# Patient Record
Sex: MALE | Race: WHITE
[De-identification: names, ages, dates, MRNs, and addresses within clinical notes are randomized per-mention and may not be internally consistent; named-entity substitution may affect disease eponyms.]

---

## 2020-08-27 ENCOUNTER — HOSPITAL ENCOUNTER (OUTPATIENT)
Dept: HOSPITAL 75 - RAD FS | Age: 60
End: 2020-08-27
Attending: NURSE PRACTITIONER
Payer: MEDICAID

## 2020-08-27 DIAGNOSIS — R74.8: Primary | ICD-10-CM

## 2020-08-27 LAB
ALBUMIN SERPL-MCNC: 4.1 GM/DL (ref 3.2–4.5)
ALP SERPL-CCNC: 63 U/L (ref 40–136)
ALT SERPL-CCNC: 25 U/L (ref 0–55)
BILIRUB SERPL-MCNC: 0.5 MG/DL (ref 0.1–1)
BUN/CREAT SERPL: 5
CALCIUM SERPL-MCNC: 9 MG/DL (ref 8.5–10.1)
CHLORIDE SERPL-SCNC: 91 MMOL/L (ref 98–107)
CO2 SERPL-SCNC: 20 MMOL/L (ref 21–32)
CREAT SERPL-MCNC: 0.56 MG/DL (ref 0.6–1.3)
GFR SERPLBLD BASED ON 1.73 SQ M-ARVRAT: > 60 ML/MIN
GLUCOSE SERPL-MCNC: 89 MG/DL (ref 70–105)
POTASSIUM SERPL-SCNC: 4.3 MMOL/L (ref 3.6–5)
PROT SERPL-MCNC: 6.9 GM/DL (ref 6.4–8.2)
SODIUM SERPL-SCNC: 125 MMOL/L (ref 135–145)

## 2020-08-27 PROCEDURE — 80053 COMPREHEN METABOLIC PANEL: CPT

## 2020-08-27 PROCEDURE — 36415 COLL VENOUS BLD VENIPUNCTURE: CPT

## 2020-08-27 PROCEDURE — 74170 CT ABD WO CNTRST FLWD CNTRST: CPT

## 2020-08-27 NOTE — DIAGNOSTIC IMAGING REPORT
EXAMINATION: CT Abdomen with and without intravenous contrast.



TECHNIQUE: Precontrast acquisitions were acquired through the

abdomen . Multiple contiguous axial images were obtained through

the abdomen after the administration of intravenous contrast. All

CT scans use one or more of the following dose optimizing

techniques: automated exposure control, MA and/or KvP adjustment

based on a patient size and exam type, or iterative

reconstruction. 



HISTORY: Liver mass.



COMPARISON: None available.



FINDINGS: 



Limited views of the lower thorax show coronary artery

calcifications. 



The liver is normal without focal lesion. There is no biliary

ductal dilation. Gallbladder is normal.  Pancreas is normal. 

Spleen is normal. Adrenal glands are normal.



The kidneys are normal. There is no hydronephrosis.



Visualized bowel is normal in caliber without obstruction or

inflammation. No free fluid or air. No abdominal lymphadenopathy.

Aorta is normal in caliber without aneurysm.



There are no suspicious osseus lesions.



IMPRESSION:



1. No liver mass is identified.



Dictated by: 



  Dictated on workstation # [id number]

## 2022-05-30 ENCOUNTER — HOSPITAL ENCOUNTER (INPATIENT)
Dept: HOSPITAL 75 - ER FS | Age: 62
LOS: 11 days | Discharge: NURSING, REHAB OR LONG-TERM CARE | DRG: 981 | End: 2022-06-10
Attending: FAMILY MEDICINE | Admitting: INTERNAL MEDICINE
Payer: MEDICARE

## 2022-05-30 VITALS — BODY MASS INDEX: 24.81 KG/M2 | WEIGHT: 187.17 LBS | HEIGHT: 73.03 IN

## 2022-05-30 VITALS — DIASTOLIC BLOOD PRESSURE: 88 MMHG | SYSTOLIC BLOOD PRESSURE: 154 MMHG

## 2022-05-30 VITALS — DIASTOLIC BLOOD PRESSURE: 93 MMHG | SYSTOLIC BLOOD PRESSURE: 160 MMHG

## 2022-05-30 DIAGNOSIS — F12.90: ICD-10-CM

## 2022-05-30 DIAGNOSIS — I25.10: ICD-10-CM

## 2022-05-30 DIAGNOSIS — I35.0: ICD-10-CM

## 2022-05-30 DIAGNOSIS — J44.1: ICD-10-CM

## 2022-05-30 DIAGNOSIS — R53.81: ICD-10-CM

## 2022-05-30 DIAGNOSIS — E78.2: ICD-10-CM

## 2022-05-30 DIAGNOSIS — I11.0: ICD-10-CM

## 2022-05-30 DIAGNOSIS — J18.9: ICD-10-CM

## 2022-05-30 DIAGNOSIS — J96.21: Primary | ICD-10-CM

## 2022-05-30 DIAGNOSIS — F17.210: ICD-10-CM

## 2022-05-30 DIAGNOSIS — I50.31: ICD-10-CM

## 2022-05-30 DIAGNOSIS — M79.652: ICD-10-CM

## 2022-05-30 DIAGNOSIS — I69.351: ICD-10-CM

## 2022-05-30 DIAGNOSIS — Z20.822: ICD-10-CM

## 2022-05-30 DIAGNOSIS — I21.A1: ICD-10-CM

## 2022-05-30 LAB
ALBUMIN SERPL-MCNC: 4 GM/DL (ref 3.2–4.5)
ALP SERPL-CCNC: 87 U/L (ref 40–136)
ALT SERPL-CCNC: 21 U/L (ref 0–55)
ARTERIAL PATENCY WRIST A: (no result)
BASE EXCESS STD BLDA CALC-SCNC: -4.1 MMOL/L (ref -2.5–2.5)
BASE EXCESS STD BLDA CALC-SCNC: -8.5 MMOL/L (ref -2.5–2.5)
BASOPHILS # BLD AUTO: 0 10^3/UL (ref 0–0.1)
BASOPHILS NFR BLD AUTO: 0 % (ref 0–10)
BDY SITE: (no result)
BDY SITE: (no result)
BILIRUB SERPL-MCNC: 0.5 MG/DL (ref 0.1–1)
BODY TEMPERATURE: 35.4
BODY TEMPERATURE: 35.7
BUN/CREAT SERPL: 12
CALCIUM SERPL-MCNC: 8.9 MG/DL (ref 8.5–10.1)
CHLORIDE SERPL-SCNC: 97 MMOL/L (ref 98–107)
CO2 BLDA CALC-SCNC: 18.4 MMOL/L (ref 21–31)
CO2 BLDA CALC-SCNC: 21.2 MMOL/L (ref 21–31)
CO2 SERPL-SCNC: 17 MMOL/L (ref 21–32)
CREAT SERPL-MCNC: 0.74 MG/DL (ref 0.6–1.3)
EOSINOPHIL # BLD AUTO: 0 10^3/UL (ref 0–0.3)
EOSINOPHIL NFR BLD AUTO: 0 % (ref 0–10)
GFR SERPLBLD BASED ON 1.73 SQ M-ARVRAT: 103 ML/MIN
GLUCOSE SERPL-MCNC: 201 MG/DL (ref 70–105)
HCT VFR BLD CALC: 45 % (ref 40–54)
HGB BLD-MCNC: 15 G/DL (ref 13.3–17.7)
INHALED O2 FLOW RATE: (no result) L/MIN
INHALED O2 FLOW RATE: (no result) L/MIN
LYMPHOCYTES # BLD AUTO: 1.6 10^3/UL (ref 1–4)
LYMPHOCYTES NFR BLD AUTO: 11 % (ref 12–44)
MANUAL DIFFERENTIAL PERFORMED BLD QL: NO
MCH RBC QN AUTO: 33 PG (ref 25–34)
MCHC RBC AUTO-ENTMCNC: 33 G/DL (ref 32–36)
MCV RBC AUTO: 100 FL (ref 80–99)
MONOCYTES # BLD AUTO: 0.9 10^3/UL (ref 0–1)
MONOCYTES NFR BLD AUTO: 7 % (ref 0–12)
NEUTROPHILS # BLD AUTO: 11.3 10^3/UL (ref 1.8–7.8)
NEUTROPHILS NFR BLD AUTO: 81 % (ref 42–75)
PCO2 BLDA: 32 MMHG (ref 35–45)
PCO2 BLDA: 36 MMHG (ref 35–45)
PH BLDA: 7.29 [PH] (ref 7.37–7.43)
PH BLDA: 7.4 [PH] (ref 7.37–7.43)
PLATELET # BLD: 222 10^3/UL (ref 130–400)
PMV BLD AUTO: 9.7 FL (ref 9–12.2)
PO2 BLDA: 132 MMHG (ref 79–93)
PO2 BLDA: 40 MMHG (ref 79–93)
POTASSIUM SERPL-SCNC: 4.5 MMOL/L (ref 3.6–5)
PROT SERPL-MCNC: 7.4 GM/DL (ref 6.4–8.2)
SAO2 % BLDA FROM PO2: 79 % (ref 94–100)
SAO2 % BLDA FROM PO2: 99 % (ref 94–100)
SODIUM SERPL-SCNC: 135 MMOL/L (ref 135–145)
VENTILATION MODE VENT: NO
VENTILATION MODE VENT: NO
WBC # BLD AUTO: 13.9 10^3/UL (ref 4.3–11)

## 2022-05-30 PROCEDURE — 96374 THER/PROPH/DIAG INJ IV PUSH: CPT

## 2022-05-30 PROCEDURE — 82947 ASSAY GLUCOSE BLOOD QUANT: CPT

## 2022-05-30 PROCEDURE — 96375 TX/PRO/DX INJ NEW DRUG ADDON: CPT

## 2022-05-30 PROCEDURE — 87205 SMEAR GRAM STAIN: CPT

## 2022-05-30 PROCEDURE — 93005 ELECTROCARDIOGRAM TRACING: CPT

## 2022-05-30 PROCEDURE — 99291 CRITICAL CARE FIRST HOUR: CPT

## 2022-05-30 PROCEDURE — 80053 COMPREHEN METABOLIC PANEL: CPT

## 2022-05-30 PROCEDURE — 84100 ASSAY OF PHOSPHORUS: CPT

## 2022-05-30 PROCEDURE — 94003 VENT MGMT INPAT SUBQ DAY: CPT

## 2022-05-30 PROCEDURE — 93454 CORONARY ARTERY ANGIO S&I: CPT

## 2022-05-30 PROCEDURE — 82805 BLOOD GASES W/O2 SATURATION: CPT

## 2022-05-30 PROCEDURE — 70450 CT HEAD/BRAIN W/O DYE: CPT

## 2022-05-30 PROCEDURE — 87070 CULTURE OTHR SPECIMN AEROBIC: CPT

## 2022-05-30 PROCEDURE — 5A09357 ASSISTANCE WITH RESPIRATORY VENTILATION, LESS THAN 24 CONSECUTIVE HOURS, CONTINUOUS POSITIVE AIRWAY PRESSURE: ICD-10-PCS | Performed by: FAMILY MEDICINE

## 2022-05-30 PROCEDURE — 94760 N-INVAS EAR/PLS OXIMETRY 1: CPT

## 2022-05-30 PROCEDURE — 93306 TTE W/DOPPLER COMPLETE: CPT

## 2022-05-30 PROCEDURE — 85025 COMPLETE CBC W/AUTO DIFF WBC: CPT

## 2022-05-30 PROCEDURE — 36600 WITHDRAWAL OF ARTERIAL BLOOD: CPT

## 2022-05-30 PROCEDURE — 84484 ASSAY OF TROPONIN QUANT: CPT

## 2022-05-30 PROCEDURE — 87040 BLOOD CULTURE FOR BACTERIA: CPT

## 2022-05-30 PROCEDURE — 71045 X-RAY EXAM CHEST 1 VIEW: CPT

## 2022-05-30 PROCEDURE — 85007 BL SMEAR W/DIFF WBC COUNT: CPT

## 2022-05-30 PROCEDURE — 83880 ASSAY OF NATRIURETIC PEPTIDE: CPT

## 2022-05-30 PROCEDURE — 87081 CULTURE SCREEN ONLY: CPT

## 2022-05-30 PROCEDURE — 94799 UNLISTED PULMONARY SVC/PX: CPT

## 2022-05-30 PROCEDURE — 83735 ASSAY OF MAGNESIUM: CPT

## 2022-05-30 PROCEDURE — 36415 COLL VENOUS BLD VENIPUNCTURE: CPT

## 2022-05-30 PROCEDURE — 84478 ASSAY OF TRIGLYCERIDES: CPT

## 2022-05-30 PROCEDURE — 83036 HEMOGLOBIN GLYCOSYLATED A1C: CPT

## 2022-05-30 PROCEDURE — 80048 BASIC METABOLIC PNL TOTAL CA: CPT

## 2022-05-30 PROCEDURE — 94640 AIRWAY INHALATION TREATMENT: CPT

## 2022-05-30 PROCEDURE — 80061 LIPID PANEL: CPT

## 2022-05-30 PROCEDURE — 87636 SARSCOV2 & INF A&B AMP PRB: CPT

## 2022-05-30 PROCEDURE — 94002 VENT MGMT INPAT INIT DAY: CPT

## 2022-05-30 PROCEDURE — 83605 ASSAY OF LACTIC ACID: CPT

## 2022-05-30 PROCEDURE — 85027 COMPLETE CBC AUTOMATED: CPT

## 2022-05-30 PROCEDURE — 93925 LOWER EXTREMITY STUDY: CPT

## 2022-05-30 RX ADMIN — DOCUSATE SODIUM SCH MG: 100 CAPSULE ORAL at 21:51

## 2022-05-30 RX ADMIN — FAMOTIDINE SCH MG: 10 INJECTION INTRAVENOUS at 22:05

## 2022-05-30 RX ADMIN — IPRATROPIUM BROMIDE AND ALBUTEROL SULFATE SCH ML: .5; 3 SOLUTION RESPIRATORY (INHALATION) at 22:08

## 2022-05-30 RX ADMIN — ENOXAPARIN SODIUM SCH MG: 100 INJECTION SUBCUTANEOUS at 22:05

## 2022-05-30 RX ADMIN — SODIUM CHLORIDE SCH MLS/HR: 900 INJECTION, SOLUTION INTRAVENOUS at 21:51

## 2022-05-30 RX ADMIN — INSULIN ASPART SCH UNIT: 100 INJECTION, SOLUTION INTRAVENOUS; SUBCUTANEOUS at 21:02

## 2022-05-30 RX ADMIN — METHYLPREDNISOLONE SODIUM SUCCINATE SCH MG: 40 INJECTION, POWDER, FOR SOLUTION INTRAMUSCULAR; INTRAVENOUS at 23:20

## 2022-05-30 RX ADMIN — SODIUM CHLORIDE SCH MLS/HR: 900 INJECTION INTRAVENOUS at 23:20

## 2022-05-30 NOTE — ED RESPIRATORY
General


Chief Complaint:  Respiratory Problems


Stated Complaint:  COPD


Nursing Triage Note:  


Patient and family report patient has COPD. Family states patient became short 


of breath overnight, wife also states patient had diarrhea overnight.


Source:  patient


Exam Limitations:  no limitations





History of Present Illness


Date Seen by Provider:  May 30, 2022


Time Seen by Provider:  16:30


Initial Comments


Patient is a 62-year-old male with history of COPD who presents to the ED with 

fever, cough, shortness of breath and increased work of breathing.  Patient has 

also had diarrhea for the past day and a half.  EMS was contacted patient was 

found to have moderate respiratory distress with O2 saturation in the upper 60s.

 Placed patient on 4 L and transition to BiPAP on EMS arrival.  Denies chest 

pain, nausea vomiting or sweats abdominal pain.  Reports chest tightness no 

chest pain.  History limited by the patient's respiratory status


Timing/Duration:  just prior to arrival


Severity:  moderate


Prior Episodes/Possible Cause:  other


Modifying Factors:  Improves With Oxygen


Associated Symptoms:  other





Allergies and Home Medications


Allergies


Coded Allergies:  


     No Known Drug Allergies (Unverified , 5/30/22)





Patient Home Medication List


Home Medication List Reviewed:  Yes





Review of Systems


Review of Systems


Constitutional:  see HPI


EENTM:  see HPI


Respiratory:  see HPI


Cardiovascular:  see HPI


Gastrointestinal:  see HPI


Genitourinary:  see HPI


Psychiatric/Neurological:  See HPI


Hematologic/Lymphatic:  See HPI


Immunological/Allergic:  see HPI





Past Medical-Social-Family Hx


Patient Social History


Tobacco Use?:  Yes


Tobacco type used:  Cigarettes


Substance use?:  No


Alcohol Use?:  No


Pt feels they are or have been:  No





Immunizations Up To Date


First/Initial COVID19 Vaccinat:  7/27/21


COVID19 Vaccine :  J&J





Past Medical History


Surgery/Hospitalization HX:  


COPD, Stroke





Physical Exam





Vital Signs - First Documented








 5/30/22





 16:42


 


Temp 35.7


 


Pulse 113


 


Resp 37


 


B/P (MAP) 121/92 (102)


 


Pulse Ox 100


 


O2 Delivery NIV Bilevel





Capillary Refill : Less Than 3 Seconds


Height: '"


Weight: lbs. oz. kg; 21.00 BMI


Method:


General Appearance:  mild distress


Eyes:  Bilateral Eye Normal Inspection, Bilateral Eye PERRL


HEENT:  PERRL/EOMI, pharynx normal


Neck:  non-tender, supple


Respiratory:  normal breath sounds, decreased breath sounds


Cardiovascular:  regular rate, rhythm, no edema


Gastrointestinal:  non tender, soft


Extremities:  normal range of motion, non-tender


Neurologic/Psychiatric:  CNs II-XII nml as tested, no motor/sensory deficits, 

alert, oriented x 3


Lymphatic:  no adenopathy





Focused Exam


Sepsis Stage:  Ruled Out





Progress/Results/Core Measures


Suspected Sepsis


SIRS


Temperature: 


Pulse: 113 


Respiratory Rate: 37


 


Laboratory Tests


5/30/22 16:46: White Blood Count 13.9H


Blood Pressure 121 /92 


Mean: 102


 


Laboratory Tests


5/30/22 16:46: 


Creatinine 0.74, Platelet Count 222, Total Bilirubin 0.5








Results/Orders


Lab Results





Laboratory Tests








Test


 5/30/22


16:46 5/30/22


17:05 5/30/22


17:10 Range/Units


 


 


White Blood Count


 13.9 H


 


 


 4.3-11.0


10^3/uL


 


Red Blood Count


 4.52 


 


 


 4.30-5.52


10^6/uL


 


Hemoglobin 15.0    13.3-17.7  g/dL


 


Hematocrit 45    40-54  %


 


Mean Corpuscular Volume 100 H   80-99  fL


 


Mean Corpuscular Hemoglobin 33    25-34  pg


 


Mean Corpuscular Hemoglobin


Concent 33 


 


 


 32-36  g/dL





 


Red Cell Distribution Width 13.8    10.0-14.5  %


 


Platelet Count


 222 


 


 


 130-400


10^3/uL


 


Mean Platelet Volume 9.7    9.0-12.2  fL


 


Immature Granulocyte % (Auto) 1     %


 


Neutrophils (%) (Auto) 81 H   42-75  %


 


Lymphocytes (%) (Auto) 11 L   12-44  %


 


Monocytes (%) (Auto) 7    0-12  %


 


Eosinophils (%) (Auto) 0    0-10  %


 


Basophils (%) (Auto) 0    0-10  %


 


Neutrophils # (Auto)


 11.3 H


 


 


 1.8-7.8


10^3/uL


 


Lymphocytes # (Auto)


 1.6 


 


 


 1.0-4.0


10^3/uL


 


Monocytes # (Auto)


 0.9 


 


 


 0.0-1.0


10^3/uL


 


Eosinophils # (Auto)


 0.0 


 


 


 0.0-0.3


10^3/uL


 


Basophils # (Auto)


 0.0 


 


 


 0.0-0.1


10^3/uL


 


Immature Granulocyte # (Auto)


 0.1 


 


 


 0.0-0.1


10^3/uL


 


Sodium Level 135    135-145  MMOL/L


 


Potassium Level 4.5    3.6-5.0  MMOL/L


 


Chloride Level 97 L     MMOL/L


 


Carbon Dioxide Level 17 L   21-32  MMOL/L


 


Anion Gap 21 H   5-14  MMOL/L


 


Blood Urea Nitrogen 9    7-18  MG/DL


 


Creatinine


 0.74 


 


 


 0.60-1.30


MG/DL


 


Estimat Glomerular Filtration


Rate 103 


 


 


  





 


BUN/Creatinine Ratio 12     


 


Glucose Level 201 H     MG/DL


 


Calcium Level 8.9    8.5-10.1  MG/DL


 


Corrected Calcium 8.9    8.5-10.1  MG/DL


 


Total Bilirubin 0.5    0.1-1.0  MG/DL


 


Aspartate Amino Transf


(AST/SGOT) 39 H


 


 


 5-34  U/L





 


Alanine Aminotransferase


(ALT/SGPT) 21 


 


 


 0-55  U/L





 


Alkaline Phosphatase 87      U/L


 


Troponin I 0.53 *H   <0.30  NG/ML


 


Pro-B-Type Natriuretic Peptide 8897.0 H   <75.0  PG/ML


 


Total Protein 7.4    6.4-8.2  GM/DL


 


Albumin 4.0    3.2-4.5  GM/DL


 


Blood Gas Puncture Site  LT RADIAL    


 


Blood Gas Patient Temperature  35.7    


 


Arterial Blood pH  7.29 *L  7.37-7.43  


 


Arterial Blood Partial


Pressure CO2 


 36 


 


 35-45  MMHG





 


Arterial Blood Partial


Pressure O2 


 132 H


 


 79-93  MMHG





 


Arterial Blood HCO3  17 *L  23-27  MMOL/L


 


Arterial Blood Total CO2


 


 18.4 L


 


 21.0-31.0


MMOL/L


 


Arterial Blood Oxygen


Saturation 


 99 


 


   %





 


Arterial Blood Base Excess


 


 -8.5 L


 


 -2.5-2.5


MMOL/L


 


Hansel Test  YES-POS    


 


Blood Gas Ventilator Setting  NO    


 


Blood Gas Inspired Oxygen  100% BIPAP    








My Orders





Orders - GALO MONTOYA DO


Albuterol/Ipra Inhalation Soln (Duoneb I (5/30/22 16:42)


Cbc With Automated Diff (5/30/22 16:49)


Comprehensive Metabolic Panel (5/30/22 16:49)


Troponin I Fs (5/30/22 16:49)


Chest 1 View Ap/Pa Only (5/30/22 16:49)


Ekg Tracing (5/30/22 16:49)


Probnp Fs (5/30/22 16:49)


Methylprednisolone Sod Succ (Solu-Medrol (5/30/22 17:00)


Albuterol/Ipra Inhalation Soln (Duoneb I (5/30/22 17:00)


Svn Small Volume Nebulizer (5/30/22 16:49)


Bipap (Bilevel) Set Up (5/30/22 16:49)


Methylprednisolone Sod Succ (Solu-Medrol (5/30/22 17:00)


Covid 19 Inhouse Test (5/30/22 17:08)


Influenza A And B By Pcr (5/30/22 17:08)


Isolation Central Supply Req (5/30/22 17:08)


Arterial Blood Gas (5/30/22 17:22)


Furosemide  Injection (Lasix  Injection) (5/30/22 17:45)


Piperacillin Sodium/Tazobactam (Zosyn Vi (5/30/22 18:15)


Lactic Acid Analyzer (5/30/22 18:07)





Medications Given in ED





Current Medications








 Medications  Dose


 Ordered  Sig/Jarocho


 Route  Start Time


 Stop Time Status Last Admin


Dose Admin


 


 Albuterol/


 Ipratropium  6 ml  ONCE  ONCE


 INH  5/30/22 17:00


 5/30/22 17:01 DC 5/30/22 16:59


6 ML


 


 Furosemide  40 mg  ONCE  ONCE


 IVP  5/30/22 17:45


 5/30/22 17:47 DC 5/30/22 18:09


40 MG


 


 Methylprednisolone


 Sodium Succinate  125 mg  ONCE  ONCE


 IVP  5/30/22 17:00


 5/30/22 17:01 DC 5/30/22 17:01


125 MG








Vital Signs/I&O











 5/30/22





 16:42


 


Temp 35.7


 


Pulse 113


 


Resp 37


 


B/P (MAP) 121/92 (102)


 


Pulse Ox 100


 


O2 Delivery NIV Bilevel





Capillary Refill : Less Than 3 Seconds








Blood Pressure Mean:                    102











Departure


Communication (Admissions)


Chest x-ray: Bibasilar opacities.


EKG: Sinus tach, rate 129, no acute ST-T wave changes.





Patient maintained on BiPAP given DuoNeb, Solu-Medrol and Lasix with decreased 

work of breathing and improved oxygen saturations.  Dr. Mueller to admt.





Impression





   Primary Impression:  


   Acute exacerbation of chronic obstructive pulmonary disease (COPD)


   Additional Impressions:  


   Acute congestive heart failure


   Acute respiratory failure with hypoxia


Disposition:  09 ADMITTED AS INPATIENT


Condition:  Improved





Admissions


Decision to Admit/Date:  May 30, 2022


Time/Decision to Admit Time:  18:32





Departure-Patient Inst.


Referrals:  


SHAGUFTA PRETTY MD, ([facility]) (PCP/Family)


Primary Care Physician











GALO MONTOYA DO                   May 30, 2022 18:03

## 2022-05-30 NOTE — DIAGNOSTIC IMAGING REPORT
CHEST 1 VIEW AP/PA ONLY



Indication: Shortness of air



Comparison: None available. 



Findings:

Heart is normal in size. Bilateral interstitial opacities are

present. No pleural effusion or pneumothorax. Normal

cardiomediastinal silhouette.



Impression: 

1. Basilar interstitial opacities raise the possibility of

noncardiogenic pulmonary edema. Underlying interstitial lung

disease could be present.



Dictated by: 



  Dictated on workstation # [id number]

## 2022-05-31 VITALS — DIASTOLIC BLOOD PRESSURE: 71 MMHG | SYSTOLIC BLOOD PRESSURE: 102 MMHG

## 2022-05-31 VITALS — DIASTOLIC BLOOD PRESSURE: 118 MMHG | SYSTOLIC BLOOD PRESSURE: 148 MMHG

## 2022-05-31 VITALS — SYSTOLIC BLOOD PRESSURE: 134 MMHG | DIASTOLIC BLOOD PRESSURE: 124 MMHG

## 2022-05-31 VITALS — SYSTOLIC BLOOD PRESSURE: 172 MMHG | DIASTOLIC BLOOD PRESSURE: 88 MMHG

## 2022-05-31 VITALS — DIASTOLIC BLOOD PRESSURE: 72 MMHG | SYSTOLIC BLOOD PRESSURE: 108 MMHG

## 2022-05-31 VITALS — DIASTOLIC BLOOD PRESSURE: 57 MMHG | SYSTOLIC BLOOD PRESSURE: 81 MMHG

## 2022-05-31 VITALS — DIASTOLIC BLOOD PRESSURE: 73 MMHG | SYSTOLIC BLOOD PRESSURE: 113 MMHG

## 2022-05-31 VITALS — DIASTOLIC BLOOD PRESSURE: 73 MMHG | SYSTOLIC BLOOD PRESSURE: 111 MMHG

## 2022-05-31 VITALS — SYSTOLIC BLOOD PRESSURE: 80 MMHG | DIASTOLIC BLOOD PRESSURE: 59 MMHG

## 2022-05-31 VITALS — DIASTOLIC BLOOD PRESSURE: 72 MMHG | SYSTOLIC BLOOD PRESSURE: 98 MMHG

## 2022-05-31 VITALS — DIASTOLIC BLOOD PRESSURE: 88 MMHG | SYSTOLIC BLOOD PRESSURE: 172 MMHG

## 2022-05-31 VITALS — SYSTOLIC BLOOD PRESSURE: 136 MMHG | DIASTOLIC BLOOD PRESSURE: 96 MMHG

## 2022-05-31 VITALS — DIASTOLIC BLOOD PRESSURE: 71 MMHG | SYSTOLIC BLOOD PRESSURE: 100 MMHG

## 2022-05-31 VITALS — DIASTOLIC BLOOD PRESSURE: 86 MMHG | SYSTOLIC BLOOD PRESSURE: 137 MMHG

## 2022-05-31 VITALS — SYSTOLIC BLOOD PRESSURE: 63 MMHG | DIASTOLIC BLOOD PRESSURE: 47 MMHG

## 2022-05-31 LAB
ALBUMIN SERPL-MCNC: 3.6 GM/DL (ref 3.2–4.5)
ALP SERPL-CCNC: 63 U/L (ref 40–136)
ALT SERPL-CCNC: 22 U/L (ref 0–55)
ARTERIAL PATENCY WRIST A: (no result)
ARTERIAL PATENCY WRIST A: (no result)
BASE EXCESS STD BLDA CALC-SCNC: -3.5 MMOL/L (ref -2.5–2.5)
BASE EXCESS STD BLDA CALC-SCNC: -5.3 MMOL/L (ref -2.5–2.5)
BASOPHILS # BLD AUTO: 0 10^3/UL (ref 0–0.1)
BASOPHILS NFR BLD AUTO: 0 % (ref 0–10)
BDY SITE: (no result)
BDY SITE: (no result)
BILIRUB SERPL-MCNC: 0.7 MG/DL (ref 0.1–1)
BODY TEMPERATURE: 36.5
BODY TEMPERATURE: 37.2
BUN/CREAT SERPL: 16
BUN/CREAT SERPL: 16
CALCIUM SERPL-MCNC: 8.3 MG/DL (ref 8.5–10.1)
CALCIUM SERPL-MCNC: 8.6 MG/DL (ref 8.5–10.1)
CHLORIDE SERPL-SCNC: 101 MMOL/L (ref 98–107)
CHLORIDE SERPL-SCNC: 102 MMOL/L (ref 98–107)
CHOLEST SERPL-MCNC: 136 MG/DL (ref ?–200)
CO2 BLDA CALC-SCNC: 21.3 MMOL/L (ref 21–31)
CO2 BLDA CALC-SCNC: 22.5 MMOL/L (ref 21–31)
CO2 SERPL-SCNC: 15 MMOL/L (ref 21–32)
CO2 SERPL-SCNC: 18 MMOL/L (ref 21–32)
CREAT SERPL-MCNC: 0.8 MG/DL (ref 0.6–1.3)
CREAT SERPL-MCNC: 0.91 MG/DL (ref 0.6–1.3)
EOSINOPHIL # BLD AUTO: 0 10^3/UL (ref 0–0.3)
EOSINOPHIL NFR BLD AUTO: 0 % (ref 0–10)
GFR SERPLBLD BASED ON 1.73 SQ M-ARVRAT: 101 ML/MIN
GFR SERPLBLD BASED ON 1.73 SQ M-ARVRAT: 96 ML/MIN
GLUCOSE SERPL-MCNC: 163 MG/DL (ref 70–105)
GLUCOSE SERPL-MCNC: 192 MG/DL (ref 70–105)
HCT VFR BLD CALC: 42 % (ref 40–54)
HCT VFR BLD CALC: 45 % (ref 40–54)
HDLC SERPL-MCNC: 52 MG/DL (ref 40–60)
HGB BLD-MCNC: 14.6 G/DL (ref 13.3–17.7)
HGB BLD-MCNC: 15.3 G/DL (ref 13.3–17.7)
INHALED O2 FLOW RATE: (no result) L/MIN
INHALED O2 FLOW RATE: (no result) L/MIN
LYMPHOCYTES # BLD AUTO: 0.9 10^3/UL (ref 1–4)
LYMPHOCYTES NFR BLD AUTO: 10 % (ref 12–44)
MAGNESIUM SERPL-MCNC: 1.3 MG/DL (ref 1.6–2.4)
MANUAL DIFFERENTIAL PERFORMED BLD QL: NO
MCH RBC QN AUTO: 33 PG (ref 25–34)
MCH RBC QN AUTO: 33 PG (ref 25–34)
MCHC RBC AUTO-ENTMCNC: 34 G/DL (ref 32–36)
MCHC RBC AUTO-ENTMCNC: 35 G/DL (ref 32–36)
MCV RBC AUTO: 96 FL (ref 80–99)
MCV RBC AUTO: 98 FL (ref 80–99)
MONOCYTES # BLD AUTO: 0.3 10^3/UL (ref 0–1)
MONOCYTES NFR BLD AUTO: 3 % (ref 0–12)
NEUTROPHILS # BLD AUTO: 7.8 10^3/UL (ref 1.8–7.8)
NEUTROPHILS NFR BLD AUTO: 86 % (ref 42–75)
PCO2 BLDA: 39 MMHG (ref 35–45)
PCO2 BLDA: 42 MMHG (ref 35–45)
PH BLDA: 7.3 [PH] (ref 7.37–7.43)
PH BLDA: 7.35 [PH] (ref 7.37–7.43)
PHOSPHATE SERPL-MCNC: 2.9 MG/DL (ref 2.3–4.7)
PLATELET # BLD: 181 10^3/UL (ref 130–400)
PLATELET # BLD: 186 10^3/UL (ref 130–400)
PMV BLD AUTO: 10 FL (ref 9–12.2)
PMV BLD AUTO: 9.8 FL (ref 9–12.2)
PO2 BLDA: 72 MMHG (ref 79–93)
PO2 BLDA: 86 MMHG (ref 79–93)
POTASSIUM SERPL-SCNC: 4.1 MMOL/L (ref 3.6–5)
POTASSIUM SERPL-SCNC: 4.1 MMOL/L (ref 3.6–5)
PROT SERPL-MCNC: 6.9 GM/DL (ref 6.4–8.2)
SAO2 % BLDA FROM PO2: 92 % (ref 94–100)
SAO2 % BLDA FROM PO2: 96 % (ref 94–100)
SODIUM SERPL-SCNC: 134 MMOL/L (ref 135–145)
SODIUM SERPL-SCNC: 136 MMOL/L (ref 135–145)
TRIGL SERPL-MCNC: 73 MG/DL (ref ?–150)
VENTILATION MODE VENT: NO
VENTILATION MODE VENT: YES
VLDLC SERPL CALC-MCNC: 15 MG/DL (ref 5–40)
WBC # BLD AUTO: 21.2 10^3/UL (ref 4.3–11)
WBC # BLD AUTO: 9 10^3/UL (ref 4.3–11)

## 2022-05-31 PROCEDURE — 0BH17EZ INSERTION OF ENDOTRACHEAL AIRWAY INTO TRACHEA, VIA NATURAL OR ARTIFICIAL OPENING: ICD-10-PCS | Performed by: FAMILY MEDICINE

## 2022-05-31 PROCEDURE — 5A1955Z RESPIRATORY VENTILATION, GREATER THAN 96 CONSECUTIVE HOURS: ICD-10-PCS | Performed by: FAMILY MEDICINE

## 2022-05-31 RX ADMIN — IPRATROPIUM BROMIDE AND ALBUTEROL SULFATE SCH ML: .5; 3 SOLUTION RESPIRATORY (INHALATION) at 19:27

## 2022-05-31 RX ADMIN — ENOXAPARIN SODIUM SCH MG: 100 INJECTION SUBCUTANEOUS at 08:57

## 2022-05-31 RX ADMIN — MAGNESIUM SULFATE IN DEXTROSE SCH MLS/HR: 10 INJECTION, SOLUTION INTRAVENOUS at 03:38

## 2022-05-31 RX ADMIN — PROPOFOL SCH MLS/HR: 10 INJECTION, EMULSION INTRAVENOUS at 19:41

## 2022-05-31 RX ADMIN — IPRATROPIUM BROMIDE AND ALBUTEROL SULFATE SCH ML: .5; 3 SOLUTION RESPIRATORY (INHALATION) at 09:54

## 2022-05-31 RX ADMIN — DEXMEDETOMIDINE HYDROCHLORIDE SCH MLS/HR: 100 INJECTION, SOLUTION, CONCENTRATE INTRAVENOUS at 19:42

## 2022-05-31 RX ADMIN — FAMOTIDINE SCH MG: 10 INJECTION INTRAVENOUS at 08:57

## 2022-05-31 RX ADMIN — DOCUSATE SODIUM SCH MG: 100 CAPSULE ORAL at 08:57

## 2022-05-31 RX ADMIN — MAGNESIUM SULFATE IN DEXTROSE SCH MLS/HR: 10 INJECTION, SOLUTION INTRAVENOUS at 05:21

## 2022-05-31 RX ADMIN — DOCUSATE SODIUM SCH MG: 100 CAPSULE ORAL at 09:09

## 2022-05-31 RX ADMIN — FAMOTIDINE SCH MG: 10 INJECTION INTRAVENOUS at 20:16

## 2022-05-31 RX ADMIN — METHYLPREDNISOLONE SODIUM SUCCINATE SCH MG: 40 INJECTION, POWDER, FOR SOLUTION INTRAMUSCULAR; INTRAVENOUS at 16:58

## 2022-05-31 RX ADMIN — SODIUM CHLORIDE SCH MLS/HR: 900 INJECTION, SOLUTION INTRAVENOUS at 05:21

## 2022-05-31 RX ADMIN — INSULIN ASPART SCH UNIT: 100 INJECTION, SOLUTION INTRAVENOUS; SUBCUTANEOUS at 15:47

## 2022-05-31 RX ADMIN — SODIUM CHLORIDE SCH MLS/HR: 900 INJECTION INTRAVENOUS at 08:58

## 2022-05-31 RX ADMIN — SODIUM CHLORIDE SCH MLS/HR: 900 INJECTION INTRAVENOUS at 23:13

## 2022-05-31 RX ADMIN — METHYLPREDNISOLONE SODIUM SUCCINATE SCH MG: 40 INJECTION, POWDER, FOR SOLUTION INTRAMUSCULAR; INTRAVENOUS at 23:13

## 2022-05-31 RX ADMIN — DOCUSATE SODIUM SCH MG: 100 CAPSULE ORAL at 20:11

## 2022-05-31 RX ADMIN — ASCORBIC ACID, VITAMIN A PALMITATE, CHOLECALCIFEROL, THIAMINE HYDROCHLORIDE, RIBOFLAVIN-5 PHOSPHATE SODIUM, PYRIDOXINE HYDROCHLORIDE, NIACINAMIDE, DEXPANTHENOL, ALPHA-TOCOPHEROL ACETATE, VITAMIN K1, FOLIC ACID, BIOTIN, CYANOCOBALAMIN SCH MLS/HR: 200; 3300; 200; 6; 3.6; 6; 40; 15; 10; 150; 600; 60; 5 INJECTION, SOLUTION INTRAVENOUS at 17:47

## 2022-05-31 RX ADMIN — IPRATROPIUM BROMIDE AND ALBUTEROL SULFATE SCH ML: .5; 3 SOLUTION RESPIRATORY (INHALATION) at 13:00

## 2022-05-31 RX ADMIN — MAGNESIUM SULFATE IN DEXTROSE SCH MLS/HR: 10 INJECTION, SOLUTION INTRAVENOUS at 06:39

## 2022-05-31 RX ADMIN — METHYLPREDNISOLONE SODIUM SUCCINATE SCH MG: 40 INJECTION, POWDER, FOR SOLUTION INTRAMUSCULAR; INTRAVENOUS at 05:19

## 2022-05-31 RX ADMIN — SODIUM CHLORIDE SCH MLS/HR: 900 INJECTION INTRAVENOUS at 15:46

## 2022-05-31 RX ADMIN — IPRATROPIUM BROMIDE AND ALBUTEROL SULFATE SCH ML: .5; 3 SOLUTION RESPIRATORY (INHALATION) at 22:45

## 2022-05-31 RX ADMIN — IPRATROPIUM BROMIDE AND ALBUTEROL SULFATE SCH ML: .5; 3 SOLUTION RESPIRATORY (INHALATION) at 02:20

## 2022-05-31 RX ADMIN — IPRATROPIUM BROMIDE AND ALBUTEROL SULFATE SCH ML: .5; 3 SOLUTION RESPIRATORY (INHALATION) at 07:39

## 2022-05-31 RX ADMIN — ENOXAPARIN SODIUM SCH MG: 100 INJECTION SUBCUTANEOUS at 20:17

## 2022-05-31 RX ADMIN — INSULIN ASPART SCH UNIT: 100 INJECTION, SOLUTION INTRAVENOUS; SUBCUTANEOUS at 23:12

## 2022-05-31 RX ADMIN — MAGNESIUM SULFATE IN DEXTROSE SCH MLS/HR: 10 INJECTION, SOLUTION INTRAVENOUS at 04:44

## 2022-05-31 RX ADMIN — INSULIN ASPART SCH UNIT: 100 INJECTION, SOLUTION INTRAVENOUS; SUBCUTANEOUS at 05:24

## 2022-05-31 RX ADMIN — METHYLPREDNISOLONE SODIUM SUCCINATE SCH MG: 40 INJECTION, POWDER, FOR SOLUTION INTRAMUSCULAR; INTRAVENOUS at 11:53

## 2022-05-31 RX ADMIN — SODIUM CHLORIDE SCH MLS/HR: 900 INJECTION, SOLUTION INTRAVENOUS at 20:17

## 2022-05-31 RX ADMIN — INSULIN ASPART SCH UNIT: 100 INJECTION, SOLUTION INTRAVENOUS; SUBCUTANEOUS at 11:52

## 2022-05-31 NOTE — HISTORY & PHYSICAL-HOSPITALIST
History of Present Illness


HPI/Chief Complaint


CC: SOB





HPI: This is a 62 yr old WM with a history of stroke with right sided 

hemiparesis. In addition to COPD. Pt continues to smoke. He presented to the ER 

in [place] with SOB. He was found to have exacerbation of COPD and acute on 

chronic respiratory failure. At this current time pt has remained stable and 

will be transferred to the 4th floor. IV steroids reviewed and pt will remain on

oxygen.


Source:  patient


Exam Limitations:  no limitations


Date Seen


5/31/22


Time Seen by a Provider:  09:00


Attending Physician


Clay Rahman MD,([facility])


PCP


Admitting Physician:


Traci Mueller DO 








Attending Physician:


Traci Mueller DO


Referring Physician





Date of Admission


May 30, 2022 at 20:08





Home Medications & Allergies


Home Medications


Reviewed patient Home Medication Reconciliation performed by pharmacy medication

reconciliations technician and/or nursing.


Patients Allergies have been reviewed.





Allergies





Allergies


Coded Allergies


  No Known Drug Allergies (Unverified5/30/22)








Past Medical-Social-Family Hx


Patient Social History


Marrital Status:  


Employed/Student:  unemployed


Tobacco Use?:  Yes


Tobacco type used:  Cigarettes


Smoking Status:  Current Everyday Smoker


Use of E-Cig and/or Vaping dev:  No


Substance use?:  Yes


Substance type:  Marijuana


Alcohol Use?:  Yes


Alcohol type:  Beer


Alcohol Frequency:  Daily


Pt feels they are or have been:  No





Immunizations Up To Date


First/Initial COVID19 Vaccinat:  7/27/21


Second COVID19 Vaccination Jeyson:  7/27/21


Tetanus Booster (TDap):  Unknown


Hepatitis A:  No


Hepatitis B:  No





Current Status


Advance Directives:  No


Communicates:  Verbally


Primary Language:  English


Preferred Spoken Language:  English


Is interpretation needed?:  No


Implanted or Applied Medical D:  None





Past Medical History


Pneumonia, COPD


High Cholesterol, Hypertension


Stroke





Review of Systems


Constitutional:  see HPI, malaise, weakness


EENTM:  no symptoms reported


Respiratory:  cough, dyspnea on exertion


Cardiovascular:  no symptoms reported


Gastrointestinal:  no symptoms reported


Genitourinary:  no symptoms reported


Musculoskeletal:  no symptoms reported


Skin:  no symptoms reported


Psychiatric/Neurological:  No Symptoms Reported


All Other Systems Reviewed


Negative Unless Noted:  Yes





Physical Exam


Physical Exam


Vital Signs





Vital Signs - First Documented








 5/30/22 5/30/22 5/30/22





 16:42 20:08 20:10


 


Temp 35.7  


 


Pulse 113  


 


Resp 37  


 


B/P (MAP) 121/92 (102)  


 


Pulse Ox 100  


 


O2 Delivery NIV Bilevel  


 


O2 Flow Rate   21.00


 


FiO2  60 





Capillary Refill : Less Than 3 Seconds


Height, Weight, BMI


Height: '"


Weight: lbs. oz. kg; 20.40 BMI


Method:


General Appearance:  Anxious, Chronically ill, Mild Distress, Thin


Neck:  Full Range of Motion, Normal Inspection, Non Tender


Respiratory:  No Accessory Muscle Use, No Respiratory Distress, Crackles, Whe

ezing


Cardiovascular:  Regular Rate, Rhythm


Extremity:  Normal Capillary Refill, Normal Inspection, Normal Range of Motion, 

Non Tender, No Calf Tenderness, No Pedal Edema


Neurologic/Psychiatric:  Alert





Results


Results/Procedures


Labs


Laboratory Tests


5/30/22 16:46








5/31/22 03:00








5/31/22 18:35








6/1/22 04:22








Patient resulted labs reviewed.





Assessment/Plan


Admission Diagnosis


Assessment:


Acute on chronic respiratory failure


Exacerbation of COPD


Alcohol withdrawal?


NSTEMI


Congestive heart failure?


Smoker


Marijuana use


Poor reserve


History of CVA with right-sided weakness





Plan:


BiPAP as needed


O2 supplement


Antibiotics


Steroids


Cardiology


Admission Status:  Inpatient Order (span 2 midnights)


Reason for Inpatient Admission:  


Respiratory failure





Diagnosis/Problems


Diagnosis/Problems





(1) Acute exacerbation of chronic obstructive pulmonary disease (COPD)


Status:  Acute


(2) History of cerebrovascular accident


(3) Nonrheumatic aortic valve stenosis with regurgitation


(4) Primary hypertension


(5) Mixed hyperlipidemia











TRACI MUELLER DO                May 31, 2022 06:19

## 2022-05-31 NOTE — PROCEDURE/INTERVENTION NOTE
Procedures/Interventions


Reason for Intubation:  acute hypoxic respiratory faillure


Date of ETT Placement:  May 31, 2022


Time of ETT Placement:  1814


Intubation Method:  orotracheal


Tube Size:  7.50


Medications:  Succinylcholine (ketamine)


Positive End Tide CO2:  Yes


Breath Sounds after Intubation:  bilateral-equal


Intubation Complications:  no complications


Post Intubation Xray:  Yes


ET tube slightly high, will advance


Patient tolerated the procedure well with no complications


Care turned over to:


ICU nurse











SANDOR VACA MD          May 31, 2022 19:56

## 2022-05-31 NOTE — OCCUPATIONAL THERAPY EVAL
OT Evaluation-General/PLF


Medical Diagnosis


Admission Date


May 30, 2022 at 20:08


Medical Diagnosis:  COPD, CHF


Onset Date:  May 30, 2022





Therapy Diagnosis


Therapy Diagnosis:  reduced adl status





Precautions


Precautions/Isolations:  Fall Prevention, Standard Precautions





Referral


Referral Reason:  Evaluation/Treatment





Medical History


Pertinent Medical History:  COPD, CVA


Current History


Pt presents to hospital with fever, cough, increased SOB and diarrhea. SpO2 in 

upper 60's. Placed on supplemental oxygen. Per patient, he lives with his wife 

in a multilevel home. He does not use any AD for mobility but his wife is always

close by. He requires assistance with dressing and bathing secondary to residual

effects (limited RUE functional use) from old CVA. Pt reports he was able to f

eed self and toilet.


Reviewed History:  Yes





Social History


Home:  Multilevel


Current Living Status:  Spouse





ADL-Prior Level of Function


SCALE: Activities may be completed with or without assistive devices.





6-Indepedent-patient completes the activity by him/herself with no assistance 

from a helper.


5-Set-up or Clean-up Assistance-helper sets up or cleans up; patient completes 

activity. [place] assists only prior to or  


    following the activity.


4-Supervision or Touching Assistance-helper provides verbal cues and/or 

touching/steadying and/or contact guard assistance as patient completes 

activity. Assistance may be provided   


    throughout the activity or intermittently.


3-Partial/Moderate Assistance-helper does LESS THAN HALF the effort. [place]illo 

lifts, holds or supports trunk or limbs, but provides less than half the effort.


2-Substantial/Maximal Assistance-helper does MORE THAN HALF the effort. [place] 

lifts or holds trunk or limbs and provides more than half the effort.


6-Pcwaczcna-oktrzy does ALL the effort. Patient does none of the effort to 

complete the activity. Or, the assistance of 2 or more helpers is required for 

the patient to complete the  


    activity.


If activity was not attempted, code reason:


7-Patient Refused.


9-Not Applicable-not attempted and the patient did not perform the activity 

before the current illness, exacerbation or injury.


10-Not Attempted due to Environmental Limitations-(lack of equipment, weather 

restraints, etc.).


88-Not Attempted due to Medical Conditions or Safety Concerns.


Self Care:  Needed Some Help


Functional Cognition:  Needed Some Help


DME/Equipment:  Bath Chair, Tub/Shower


Drive Self:  No





OT Current Status


Subjective


Pt denies pain, agreeable to eval.





Appearance


Pt left sitting in recliner, RN notified. All needs within reach.





Mental Status/Objective


Patient Orientation:  Person, Place


Attachments:  IV, Oxygen, Telemetry





Current


Hearing Aids:  No


Hand Dominance:  Right


Upper Extremity ROM


flexed synergy pattern: RUE. Limited shoulder ROM, ~1/4 AROM. Pt reports pain in

anterior shoulder with PROM. Spastic elbow/hand/wrist. Tremors notable in BUE's.




LUE: WFL


Upper Extremity Strength


LUE: 3+/5 throughout





ADL-Treatment


Eating (QC):  4


On/Off Footwear (QC):  4


Bilateral socks donned while long sitting in bed. Extra time required but good 

one handed technique notable. SBA to sit EOB, good sitting balance. Pt on 4L O2,

desats to low 80's post bed mobility. Recovers quickly with rest and cues for 

PLB. HR increases from 108-135 bpm with activity. Sit<>stand: impulsive, CGA for

safety. Ataxic like movements when ambulating short distance; ~5 feet, towards 

chair. Pt exhibits SOB after minimal activity. BP: 206/125. After ~1 minute of 

rest, BP: 200/106. RN notified.





Education


OT Patient Education:  Correct positioning, Modified ADL techniques, Purpose of 

tx/functional activities, Safety issues


Teaching Recipient:  Patient


Teaching Methods:  Demonstration, Discussion


Response to Teaching:  Verbalize Understanding, Return Demonstration, 

Reinforcement Needed





OT Long Term Goals


Long Term Goals


Time Frame:  Ammon 10, 2022


Eating (QC):  5


Oral Hygiene (QC):  5


Toileting Hygiene (QC):  4


Shower/Bathe Self (QC):  3


Upper Body Dressing (QC):  3


Lower Body Dressing (QC):  3


1=Demonstrate adherence to instructed precautions during ADL tasks.


2=Patient will verbalize/demonstrate understanding of assistive 

devices/modifications for ADL.


3=Patient will improve strength/tolerance for activity to enable patient to 

perform ADL's.





OT Education/Plan


Problem List/Assessment


Assessment:  Decreased Activ Tolerance, Decreased Safety Aware, Decreased UE 

Strength, Impaired Cognition, Impaired Coordination, Impaired Funct Balance, 

Impaired Self-Care Skills, Restricted Funct UE ROM





Discharge Recommendations


Plan/Recommendations:  Continue POC


Target Placement


ongoing assessment, Home vs Home health pending progress





Treatment Plan/Plan of Care


Treatment,Training & Education:  Yes


Patient would benefit from OT for education, treatment and training to promote 

independence in ADL's, mobility, safety and/or upper extremity function for 

ADL's.


Plan of Care:  ADL Retraining, Caregiver Training, Functional Mobility, Group 

Exercise/Act as Ind, UE Funct Exercise/Act, UE Neuromus Re-Ed/Coord


Treatment Duration:  Ammon 10, 2022


Frequency:  3 times per week (3-5x/week)


Estimated Hrs Per Day:  .25 hour per day


Rehab Potential:  Fair





Time/GCodes


Start Time:  10:04


Stop Time:  10:27


Total Time Billed (hr/min):  23


Billed Treatment Time


1 visit


EVM (10 min)


FA (13 min)











Akosua Lynn OT                May 31, 2022 10:38

## 2022-05-31 NOTE — PHYSICAL THERAPY EVALUATION
PT Evaluation-General


Medical Diagnosis


Admission Date


May 30, 2022 at 20:08


Medical Diagnosis:  COPD exacerbation


Onset Date:  May 30, 2022





Therapy Diagnosis


Therapy Diagnosis:  debility/weakness





Precautions


Precautions/Isolations:  Fall Prevention, Standard Precautions





Referral


Physician:  Ervin


Reason for Referral:  Evaluation/Treatment





Medical History


Pertinent Medical History:  COPD, CVA


Current History


ER secondary to SOA with decreased SAO2


Reviewed History:  Yes





Social History


Home:  [facility]


Current Living Status:  Spouse





Prior


Prior Level of Function


SCALE: Activities may be completed with or without assistive devices.





6-Indepedent-patient completes the activity by him/herself with no assistance 

from a helper.


5-Set-up or Clean-up Assistance-helper sets up or cleans up; patient completes 

activity. [place] assists only prior to or  


    following the activity.


4-Supervision or Touching Assistance-helper provides verbal cues and/or 

touching/steadying and/or contact guard assistance as patient completes 

activity. Assistance may be provided   


    throughout the activity or intermittently.


3-Partial/Moderate Assistance-helper does LESS THAN HALF the effort. [place] 

lifts, holds or supports trunk or limbs, but provides less than half the effort.


2-Substantial/Maximal Assistance-helper does MORE THAN HALF the effort. [place] 

lifts or holds trunk or limbs and provides more than half the effort.


7-Vbhkikims-sbgzkq does ALL the effort. Patient does none of the effort to 

complete the activity. Or, the assistance of 2 or more helpers is required for 

the patient to complete the  


    activity.


If activity was not attempted, code reason:


7-Patient Refused.


9-Not Applicable-not attempted and the patient did not perform the activity 

before the current illness, exacerbation or injury.


10-Not Attempted due to Environmental Limitations-(lack of equipment, weather 

restraints, etc.).


88-Not Attempted due to Medical Conditions or Safety Concerns.


Bed Mobility:  4


Transfers (B,C,W/C):  3


Gait:  3


Stairs:  3


Indoor Mobility (Ambulation):  Needed Some Help


Stairs:  Needed Some Help


Prior Devices Use:  None (spouse assist)





PT Evaluation-Current


Subjective


Patient reports spouse assists with all mobility and ADL's





Pain





   Numeric Pain Scale:  0-No Pain


   Location:  No Pain Reported





Objective


Patient Orientation:  Normal For Age


Attachments:  Oxygen (4L NC), IV





ROM/Strength


ROM Lower Extremities


bilateral LE WFL


Strength Lower Extremities


3+/5 grossly bilateral LE





Integumentary/Posture


Integumentary


refer to nursing notes


Bowel Incontinence:  No


Bladder Incontinence:  No


Posture


kyphotic





Neuromuscular


(Tone, Coordination, Reflexes)


ataxic with all gross motor mobility





Sensory


Vision:  Functional


Hearing:  Functional





Transfers


Lying to Sitting/Side of Bed(Q:  4


Sit to Stand (QC):  3


Chair/Bed-to-Chair Xfer(QC):  3





Gait


Mode of Locomotion:  Walk


Anticipated Mode of Locomotion:  Walk


Walk 10 feet (QC):  3


Gait Assistive Device:  None


Comments/Gait Description


PT assist with noted ataxia with all mobility





Balance


Sitting Static:  Fair


Sitting Dynamic:  Fair


Standing Static:  Poor


 Standing Dynamic:  Poor





Assessment/Needs


Noted elevated /125 with RN being notified.  Patient BP after resting for 

~2 minutes 200/106.  Patient's ataxia causes increase in fall risk.


Rehab Potential:  Fair





PT Long Term Goals


Long Term Goals


PT Long Term Goals Time Frame:  Jun 11, 2022


Roll Left & Right (QC):  4


Sit to Lying (QC):  4


Lying-Sitting on Side/Bed(QC):  4


Sit to Stand (QC):  3


Chair/Bed-to-Chair Xfer(QC):  3


Toilet Transfer (QC):  3


Walk 10 feet (QC):  3


Walk 50ft with 2 Turns (QC):  3


Walk 150 ft (QC):  3





PT Plan


Problem List


Problem List:  Activity Tolerance, Functional Strength, Safety, Balance, Gait, 

Transfer





Treatment/Plan


Treatment Plan:  Continue Plan of Care


Treatment Plan:  Education, Functional Activity Yanni, Functional Strength, 

Gait, Safety, Therapeutic Exercise, Transfers


Treatment Duration:  Jun 11, 2022


Frequency:  6 times per week


Estimated Hrs Per Day:  .25 hour per day


Patient and/or Family Agrees t:  Yes





Time/GCodes


Time In:  1015


Time Out:  1028


Total Billed Treatment Time:  13


Total Billed Treatment


1 visit


EVMod 13 min











MARICEL WILSON PT              May 31, 2022 10:48

## 2022-05-31 NOTE — DIAGNOSTIC IMAGING REPORT
Indication: Increasing shortness of air.



Time of Exam: 5:33 PM



Correlation is made with prior study 1 day earlier.



Heart size stable. Since the prior study, the patient has

developed significant bilateral interstitial and airspace

pulmonary infiltrates. This is greatest in the right lung. No

effusion or pneumothorax is seen.



IMPRESSION: Worsening appearance of the chest where the patient

has developed significant bilateral pulmonary infiltrates since

exam one day earlier.



Dictated by: 



  Dictated on workstation # [id number]

## 2022-05-31 NOTE — CONSULTATION-CARDIOLOGY
HPI-Cardiology


Cardiology Consultation:


Date of Consultation


5/31/22


Date of Admission


5/30/22


Attending Physician


Clay Rahman MD,(DDU)


Admitting Physician


Admitting Physician:


Traci Mueller DO 








Attending Physician:


Traci Mueller DO


Consulting Physician


RANJANA RIVERA JR, MD





HPI:


Time Seen by a Provider:  16:55


Chief Complaint:





REASON FOR CONSULTATION: Possible NSTEMI.





Had the pleasure of seeing Brett on the medical floor at [facility] 

in [place], [place] today.  He was wearing a BiPAP and it was somewhat difficult to

communicate with MD because of this.  On 5/30 he presented to [place] 

emergency room with increasing dyspnea of unknown duration.  He also reports a 

cough of unknown duration.  He was felt to be having an exacerbation of chronic 

obstructive pulmonary disease but was also found to have an elevated troponin 

level as well as a BNP level.  He was subsequently transferred to our hospital 

for further treatment and evaluation.  He does not feel as though his breathing 

has made any improvement since being transferred here yesterday.  He denies 

chest discomfort, paroxysmal nocturnal dyspnea, orthopnea, palpitations, 

lightheadedness, syncope, or ankle edema.  Because of the heart failure and 

possible NSTEMI, a cardiology consultation was requested.  He denies any 

previous history of cardiac disease.  He does report that he takes medication 

for hypertension and cholesterol at home but denies using any pulmonary 

medication.





Certain portions of this document may have been dictated utilizing voice 

recognition technology.  Inherent to this technology, typographical and 

grammatical errors may exist.  As much as I am diligent to identify and correct 

these mistakes, some errors may remain in the document.





Review of Systems-Cardiology


Review of Systems


Other comments


Not obtainable due to the patient's clinical status.





PMH-Social-Family Hx


Patient Social History


Smoking Status:  Current Everyday Smoker


Have you traveled recently?:  No


Alcohol Use?:  Yes


Substance type:  Marijuana


Pt feels they are or have been:  No


Tobacco type used:  Cigarettes





Past Medical History


PMH


As described under Assessment.





Family Medical History


Family Medical History:  


The patient does not know of any family history of premature coronary


artery disease in first-degree relatives.





Allergies and Home Medications


Allergies


Coded Allergies:  


     No Known Drug Allergies (Unverified , 5/30/22)





Patient Home Medication List


Home Medication List Reviewed:  Yes





Exam


Vital Signs





Vital Signs








  Date Time  Temp Pulse Resp B/P (MAP) Pulse Ox O2 Delivery O2 Flow Rate FiO2


 


5/31/22 15:27 37.2 118 34 137/86 (103) 94 NIV Bilevel 60.00 


 


5/30/22 23:25        24








Physical Exam


General: Alert.  Moderate respiratory distress on BiPAP.  He appears 

malnourished and older than his stated age.


Eye: Extraocular movements are intact. Conjunctivae are clear. There are no 

xanthelasma.


HENT: Normocephalic. Atraumatic. Carotid pulsations 2/2 without bruits.


Neck: Jugular venous pressure does not appear elevated. No thyromegaly 

appreciated.


Respiratory: Lungs have diffuse coarse breath sounds from the BiPAP. 

Respirations are non-labored. Breath sounds are equal. Symmetrical chest wall 

expansion.


Cardiovascular: Normal rate. Regular rhythm.  Distant S1 and S2.  2/6 systolic 

ejection murmur. No gallop. Point of maximal impulse is not appear displaced. 

Good pulses equal in all extremities. No edema.


Gastrointestinal: Soft. Normal bowel sounds.


Skin: Skin turgor is normal. There is no pallor.


Musculoskeletal: No kyphosis or scoliosis appreciated.


Neurologic: Alert and oriented to person, place, time. Cranial nerves 3-12 

appear grossly intact.  Right arm partial paresis with contractures of the hand 

and wrist.


Psychiatric: Cooperative. Appropriate mood & affect.


Labs





Laboratory Tests








Test


 5/30/22


17:05 5/30/22


17:10 5/30/22


19:00 5/30/22


21:00 Range/Units


 


 


Blood Gas Puncture Site LT RADIAL    LEFT AC   


 


Blood Gas Patient Temperature 35.7    35.4   


 


Arterial Blood pH 7.29 *L   7.40  7.37-7.43  


 


Arterial Blood Partial


Pressure CO2 36 


 


 


 32 L


 35-45  MMHG





 


Arterial Blood Partial


Pressure O2 132 H


 


 


 40 L


 79-93  MMHG





 


Arterial Blood HCO3 17 *L   20 L 23-27  MMOL/L


 


Arterial Blood Total CO2


 18.4 L


 


 


 21.2 


 21.0-31.0


MMOL/L


 


Arterial Blood Oxygen


Saturation 99 


 


 


 79 L


   %





 


Arterial Blood Base Excess


 -8.5 L


 


 


 -4.1 L


 -2.5-2.5


MMOL/L


 


Hansel Test YES-POS    NA   


 


Blood Gas Ventilator Setting NO    NO   


 


Blood Gas Inspired Oxygen 100% BIPAP    21%   


 


Influenza Type A (RT-PCR)  Not Detected    Not Detecte  


 


Influenza Type B (RT-PCR)  Not Detected    Not Detecte  


 


SARS-CoV-2 RNA (RT-PCR)  Not Detected    Not Detecte  


 


Lactic Acid Level


 


 


 5.74 *H


 5.34 *H


 0.50-2.00


MMOL/L


 


Test


 5/30/22


21:01 5/30/22


22:57 5/31/22


03:00 5/31/22


05:18 Range/Units


 


 


Glucometer 178 H   231 H   MG/DL


 


Lactic Acid Level


 


 6.35 *H


 4.08 *H


 


 0.50-2.00


MMOL/L


 


Troponin I  0.602 *H 0.590 *H  <0.028  NG/ML


 


White Blood Count


 


 


 9.0 


 


 4.3-11.0


10^3/uL


 


Red Blood Count


 


 


 4.40 


 


 4.30-5.52


10^6/uL


 


Hemoglobin   14.6   13.3-17.7  g/dL


 


Hematocrit   42   40-54  %


 


Mean Corpuscular Volume   96   80-99  fL


 


Mean Corpuscular Hemoglobin   33   25-34  pg


 


Mean Corpuscular Hemoglobin


Concent 


 


 35 


 


 32-36  g/dL





 


Red Cell Distribution Width   13.5   10.0-14.5  %


 


Platelet Count


 


 


 181 


 


 130-400


10^3/uL


 


Mean Platelet Volume   10.0   9.0-12.2  fL


 


Immature Granulocyte % (Auto)   0    %


 


Neutrophils (%) (Auto)   86 H  42-75  %


 


Lymphocytes (%) (Auto)   10 L  12-44  %


 


Monocytes (%) (Auto)   3   0-12  %


 


Eosinophils (%) (Auto)   0   0-10  %


 


Basophils (%) (Auto)   0   0-10  %


 


Neutrophils # (Auto)


 


 


 7.8 


 


 1.8-7.8


10^3/uL


 


Lymphocytes # (Auto)


 


 


 0.9 L


 


 1.0-4.0


10^3/uL


 


Monocytes # (Auto)


 


 


 0.3 


 


 0.0-1.0


10^3/uL


 


Eosinophils # (Auto)


 


 


 0.0 


 


 0.0-0.3


10^3/uL


 


Basophils # (Auto)


 


 


 0.0 


 


 0.0-0.1


10^3/uL


 


Immature Granulocyte # (Auto)


 


 


 0.0 


 


 0.0-0.1


10^3/uL


 


Sodium Level   136   135-145  MMOL/L


 


Potassium Level   4.1   3.6-5.0  MMOL/L


 


Chloride Level   102     MMOL/L


 


Carbon Dioxide Level   15 L  21-32  MMOL/L


 


Anion Gap   19 H  5-14  MMOL/L


 


Blood Urea Nitrogen   15   7-18  MG/DL


 


Creatinine


 


 


 0.91 


 


 0.60-1.30


MG/DL


 


Estimat Glomerular Filtration


Rate 


 


 96 


 


  





 


BUN/Creatinine Ratio   16    


 


Glucose Level   192 H    MG/DL


 


Calcium Level   8.6   8.5-10.1  MG/DL


 


Corrected Calcium   8.9   8.5-10.1  MG/DL


 


Phosphorus Level   2.9   2.3-4.7  MG/DL


 


Magnesium Level   1.3 L  1.6-2.4  MG/DL


 


Total Bilirubin   0.7   0.1-1.0  MG/DL


 


Aspartate Amino Transf


(AST/SGOT) 


 


 32 


 


 5-34  U/L





 


Alanine Aminotransferase


(ALT/SGPT) 


 


 22 


 


 0-55  U/L





 


Alkaline Phosphatase   63     U/L


 


Total Protein   6.9   6.4-8.2  GM/DL


 


Albumin   3.6   3.2-4.5  GM/DL


 


Triglycerides Level   73   <150  MG/DL


 


VLDL Cholesterol   15   5-40  MG/DL


 


Test


 5/31/22


11:14 5/31/22


15:34 


 


 Range/Units


 


 


Glucometer 236 H 144 H     MG/DL








Radiology





ECHOCARDIOGRAM (05/31/2022):


1. This is a technically difficult study due to the patient's underlying lung 

disease with poor acoustic windows.


2. Left ventricle: The cavity size is normal. Wall thickness is normal. Systolic

function is normal. The estimated ejection fraction is 55-60%. Regional wall 

motion abnormalities cannot be excluded due to poor endocardial definition. 

Features are consistent with a pseudonormal left ventricular filling pattern, 

with concomitant abnormal relaxation and increased filling pressure (grade 2 

diastolic dysfunction).


3. Aortic valve: The aortic valve structure is not well visualized but the 

leaflets appear to be thickened and calcified with restricted leaflet mobility. 

There is moderate aortic stenosis with a mean gradient of 30 mmHg, a peak 

gradient of 58 mmHg, a peak velocity of 3.8 m/s and a calculated aortic valve 

area of 1.1 cm. There is moderate aortic regurgitation with a pressure half-

time of 207 ms.


4. Mitral valve: There is mild mitral regurgitation.


5. Inferior vena cava: The vessel is dilated. The respirophasic diameter changes

are in the normal range (greater than or equal to 50%). These findings are 

consistent with mildly elevated right atrial pressure (8 mmHg).


6. Pulmonary arteries: The estimated pulmonary artery systolic pressure is 33 

mmHg assuming a right atrial pressure of 8 mmHg.





ECG Impression


ECG


Comment


Electrocardiogram from 5/30 shows sinus tachycardia at 129 bpm with 1 isolated 

premature ventricular complexes, poor R wave progression and nonspecific T wave 

changes.





Diagnosis/Problems


Diagnosis/Problems





(1) Non-ST elevation myocardial infarction (NSTEMI), initial care episode


Assessment & Plan:  His troponin levels are elevated although flat.  The 

troponin elevation raises a concern about a non-ST elevation myocardial 

infarction although the patient is not complaining of chest pain and there are 

no ischemic changes on his electrocardiogram.  This could be a type II non-ST 

elevation myocardial infarction due to his acute respiratory failure.  However, 

I cannot completely exclude a type I non-ST elevation myocardial infarction.  I 

recommend he continue on aspirin which he was taking at home.  I will resume 

statin medication which she was also taking at home.  Due to his acute 

respiratory failure most likely due to chronic obstructive pulmonary disease, I 

will hold off on giving him a beta-blocker at this time since this could cause 

worsening bronchospasm.  Assuming he recovers from the acute respiratory 

illness, he will ultimately need to be considered for an ischemic evaluation.  

However, in his present state, he has not an ideal candidate for noninvasive or 

an invasive coronary ischemic evaluation.





(2) Acute heart failure with preserved ejection fraction (HFpEF)


Assessment & Plan:  He does have an elevated BNP level and he is short of 

breath.  However, this is in the setting of an acute exacerbation of his chronic

obstructive pulmonary disease with acute respiratory failure.  Some of the BNP 

elevation may also be due to aortic stenosis.  He received a dose of IV Lasix at

the outside emergency room.  I will give him another dose today.  I have ordered

a chest x-ray for the morning.





(3) Nonrheumatic aortic valve stenosis with regurgitation


Assessment & Plan:  His echocardiogram is consistent with significant aortic 

stenosis as well as regurgitation.  Assuming he recovers from the pulmonary 

condition, we may need to consider whether or not to evaluate him for any sort 

of aortic valve intervention.  This could most likely be done electively.





(4) Primary hypertension


Assessment & Plan:  I will restart his lisinopril.  As above, I do not think he 

is good candidate for beta-blocker at this time due to his acute respiratory 

failure.





(5) Mixed hyperlipidemia


Assessment & Plan:  I have ordered statin medication which she appears to be 

taking at home





(6) Acute respiratory failure with hypoxia


Status:  Acute


Assessment & Plan:  Most likely due to chronic obstructive pulmonary disease.





(7) History of cerebrovascular accident


Assessment & Plan:  Resume aspirin and statin medication.  He has not had any 

recent neurologic complaints.





(8) Cigarette smoker


Assessment & Plan:  He needs to quit smoking.  He was counseled in this regard.














RANJANA RIVERA JR, MD         May 31, 2022 15:39

## 2022-05-31 NOTE — DIAGNOSTIC IMAGING REPORT
CHEST 1 VIEW, AP/PA ONLY



Indication: ET tube placement.



Comparison: Earlier same day at 6:18 PM



Findings:

The ET tube now has tip approximately 7 cm above the alia. No

change in bilateral pulmonary opacities. Stable enteric tubes.

Stable cardiac silhouette. No pneumothorax.



Impression: 

1. ET tube now has tip approximately 7 cm above the alia.



Dictated by: 



  Dictated on workstation # [id number]

## 2022-05-31 NOTE — DIAGNOSTIC IMAGING REPORT
CHEST 1 VIEW, AP/PA ONLY



Indication: Intubation



Comparison: Earlier same day at 5:33 PM



Findings:

ET tube has tip approximately 8 cm above the alia. Enteric tube

has tip and side port terminating in the proximal stomach. No

change in diffuse bilateral pulmonary opacities. Stable cardiac

silhouette.



Impression: 

1. ET tube has tip approximately 8 cm above the alia. Consider

advancement of 3 cm.

2. Well-positioned enteric tube.

3. No change in bilateral pulmonary consolidations.



Dictated by: 



  Dictated on workstation # [id number]

## 2022-06-01 VITALS — SYSTOLIC BLOOD PRESSURE: 133 MMHG | DIASTOLIC BLOOD PRESSURE: 76 MMHG

## 2022-06-01 VITALS — SYSTOLIC BLOOD PRESSURE: 142 MMHG | DIASTOLIC BLOOD PRESSURE: 82 MMHG

## 2022-06-01 VITALS — DIASTOLIC BLOOD PRESSURE: 74 MMHG | SYSTOLIC BLOOD PRESSURE: 113 MMHG

## 2022-06-01 VITALS — SYSTOLIC BLOOD PRESSURE: 153 MMHG | DIASTOLIC BLOOD PRESSURE: 85 MMHG

## 2022-06-01 VITALS — SYSTOLIC BLOOD PRESSURE: 142 MMHG | DIASTOLIC BLOOD PRESSURE: 80 MMHG

## 2022-06-01 VITALS — DIASTOLIC BLOOD PRESSURE: 83 MMHG | SYSTOLIC BLOOD PRESSURE: 147 MMHG

## 2022-06-01 VITALS — DIASTOLIC BLOOD PRESSURE: 83 MMHG | SYSTOLIC BLOOD PRESSURE: 124 MMHG

## 2022-06-01 VITALS — SYSTOLIC BLOOD PRESSURE: 134 MMHG | DIASTOLIC BLOOD PRESSURE: 75 MMHG

## 2022-06-01 VITALS — DIASTOLIC BLOOD PRESSURE: 83 MMHG | SYSTOLIC BLOOD PRESSURE: 148 MMHG

## 2022-06-01 VITALS — SYSTOLIC BLOOD PRESSURE: 138 MMHG | DIASTOLIC BLOOD PRESSURE: 78 MMHG

## 2022-06-01 VITALS — DIASTOLIC BLOOD PRESSURE: 69 MMHG | SYSTOLIC BLOOD PRESSURE: 122 MMHG

## 2022-06-01 VITALS — DIASTOLIC BLOOD PRESSURE: 82 MMHG | SYSTOLIC BLOOD PRESSURE: 147 MMHG

## 2022-06-01 VITALS — SYSTOLIC BLOOD PRESSURE: 144 MMHG | DIASTOLIC BLOOD PRESSURE: 79 MMHG

## 2022-06-01 VITALS — SYSTOLIC BLOOD PRESSURE: 144 MMHG | DIASTOLIC BLOOD PRESSURE: 81 MMHG

## 2022-06-01 VITALS — SYSTOLIC BLOOD PRESSURE: 138 MMHG | DIASTOLIC BLOOD PRESSURE: 76 MMHG

## 2022-06-01 VITALS — DIASTOLIC BLOOD PRESSURE: 76 MMHG | SYSTOLIC BLOOD PRESSURE: 138 MMHG

## 2022-06-01 VITALS — SYSTOLIC BLOOD PRESSURE: 141 MMHG | DIASTOLIC BLOOD PRESSURE: 81 MMHG

## 2022-06-01 VITALS — SYSTOLIC BLOOD PRESSURE: 143 MMHG | DIASTOLIC BLOOD PRESSURE: 80 MMHG

## 2022-06-01 VITALS — DIASTOLIC BLOOD PRESSURE: 82 MMHG | SYSTOLIC BLOOD PRESSURE: 146 MMHG

## 2022-06-01 VITALS — DIASTOLIC BLOOD PRESSURE: 81 MMHG | SYSTOLIC BLOOD PRESSURE: 143 MMHG

## 2022-06-01 VITALS — SYSTOLIC BLOOD PRESSURE: 131 MMHG | DIASTOLIC BLOOD PRESSURE: 74 MMHG

## 2022-06-01 VITALS — SYSTOLIC BLOOD PRESSURE: 143 MMHG | DIASTOLIC BLOOD PRESSURE: 81 MMHG

## 2022-06-01 VITALS — SYSTOLIC BLOOD PRESSURE: 124 MMHG | DIASTOLIC BLOOD PRESSURE: 77 MMHG

## 2022-06-01 VITALS — SYSTOLIC BLOOD PRESSURE: 122 MMHG | DIASTOLIC BLOOD PRESSURE: 69 MMHG

## 2022-06-01 VITALS — SYSTOLIC BLOOD PRESSURE: 141 MMHG | DIASTOLIC BLOOD PRESSURE: 72 MMHG

## 2022-06-01 VITALS — SYSTOLIC BLOOD PRESSURE: 133 MMHG | DIASTOLIC BLOOD PRESSURE: 68 MMHG

## 2022-06-01 VITALS — SYSTOLIC BLOOD PRESSURE: 127 MMHG | DIASTOLIC BLOOD PRESSURE: 79 MMHG

## 2022-06-01 VITALS — SYSTOLIC BLOOD PRESSURE: 122 MMHG | DIASTOLIC BLOOD PRESSURE: 78 MMHG

## 2022-06-01 VITALS — SYSTOLIC BLOOD PRESSURE: 136 MMHG | DIASTOLIC BLOOD PRESSURE: 76 MMHG

## 2022-06-01 VITALS — SYSTOLIC BLOOD PRESSURE: 121 MMHG | DIASTOLIC BLOOD PRESSURE: 71 MMHG

## 2022-06-01 VITALS — SYSTOLIC BLOOD PRESSURE: 141 MMHG | DIASTOLIC BLOOD PRESSURE: 86 MMHG

## 2022-06-01 LAB
ALBUMIN SERPL-MCNC: 3.4 GM/DL (ref 3.2–4.5)
ALP SERPL-CCNC: 53 U/L (ref 40–136)
ALT SERPL-CCNC: 20 U/L (ref 0–55)
ARTERIAL PATENCY WRIST A: (no result)
BASE EXCESS STD BLDA CALC-SCNC: -1.1 MMOL/L (ref -2.5–2.5)
BASOPHILS # BLD AUTO: 0 10^3/UL (ref 0–0.1)
BASOPHILS NFR BLD AUTO: 0 % (ref 0–10)
BDY SITE: (no result)
BILIRUB SERPL-MCNC: 0.6 MG/DL (ref 0.1–1)
BODY TEMPERATURE: 37.4
BUN/CREAT SERPL: 15
CALCIUM SERPL-MCNC: 8.1 MG/DL (ref 8.5–10.1)
CHLORIDE SERPL-SCNC: 101 MMOL/L (ref 98–107)
CO2 BLDA CALC-SCNC: 23.6 MMOL/L (ref 21–31)
CO2 SERPL-SCNC: 18 MMOL/L (ref 21–32)
CREAT SERPL-MCNC: 0.81 MG/DL (ref 0.6–1.3)
EOSINOPHIL # BLD AUTO: 0 10^3/UL (ref 0–0.3)
EOSINOPHIL NFR BLD AUTO: 0 % (ref 0–10)
GFR SERPLBLD BASED ON 1.73 SQ M-ARVRAT: 100 ML/MIN
GLUCOSE SERPL-MCNC: 200 MG/DL (ref 70–105)
HCT VFR BLD CALC: 41 % (ref 40–54)
HGB BLD-MCNC: 14.1 G/DL (ref 13.3–17.7)
LYMPHOCYTES # BLD AUTO: 0.5 10^3/UL (ref 1–4)
LYMPHOCYTES NFR BLD AUTO: 3 % (ref 12–44)
MAGNESIUM SERPL-MCNC: 2.4 MG/DL (ref 1.6–2.4)
MANUAL DIFFERENTIAL PERFORMED BLD QL: NO
MCH RBC QN AUTO: 33 PG (ref 25–34)
MCHC RBC AUTO-ENTMCNC: 35 G/DL (ref 32–36)
MCV RBC AUTO: 95 FL (ref 80–99)
MONOCYTES # BLD AUTO: 0.7 10^3/UL (ref 0–1)
MONOCYTES NFR BLD AUTO: 5 % (ref 0–12)
NEUTROPHILS # BLD AUTO: 14.1 10^3/UL (ref 1.8–7.8)
NEUTROPHILS NFR BLD AUTO: 92 % (ref 42–75)
PCO2 BLDA: 35 MMHG (ref 35–45)
PH BLDA: 7.43 [PH] (ref 7.37–7.43)
PLATELET # BLD: 152 10^3/UL (ref 130–400)
PMV BLD AUTO: 10.1 FL (ref 9–12.2)
PO2 BLDA: 82 MMHG (ref 79–93)
POTASSIUM SERPL-SCNC: 3.8 MMOL/L (ref 3.6–5)
PROT SERPL-MCNC: 6.4 GM/DL (ref 6.4–8.2)
SAO2 % BLDA FROM PO2: 97 % (ref 94–100)
SODIUM SERPL-SCNC: 135 MMOL/L (ref 135–145)
VENTILATION MODE VENT: YES
WBC # BLD AUTO: 15.4 10^3/UL (ref 4.3–11)

## 2022-06-01 RX ADMIN — FAMOTIDINE SCH MG: 10 INJECTION INTRAVENOUS at 08:38

## 2022-06-01 RX ADMIN — IPRATROPIUM BROMIDE AND ALBUTEROL SULFATE SCH ML: .5; 3 SOLUTION RESPIRATORY (INHALATION) at 10:32

## 2022-06-01 RX ADMIN — DOCUSATE SODIUM SCH MG: 100 CAPSULE ORAL at 20:19

## 2022-06-01 RX ADMIN — METHYLPREDNISOLONE SODIUM SUCCINATE SCH MG: 40 INJECTION, POWDER, FOR SOLUTION INTRAMUSCULAR; INTRAVENOUS at 05:12

## 2022-06-01 RX ADMIN — ASPIRIN 81 MG CHEWABLE TABLET SCH MG: 81 TABLET CHEWABLE at 08:38

## 2022-06-01 RX ADMIN — PROPOFOL SCH MLS/HR: 10 INJECTION, EMULSION INTRAVENOUS at 18:25

## 2022-06-01 RX ADMIN — IPRATROPIUM BROMIDE AND ALBUTEROL SULFATE SCH ML: .5; 3 SOLUTION RESPIRATORY (INHALATION) at 07:09

## 2022-06-01 RX ADMIN — METHYLPREDNISOLONE SODIUM SUCCINATE SCH MG: 40 INJECTION, POWDER, FOR SOLUTION INTRAMUSCULAR; INTRAVENOUS at 23:05

## 2022-06-01 RX ADMIN — ENOXAPARIN SODIUM SCH MG: 100 INJECTION SUBCUTANEOUS at 20:19

## 2022-06-01 RX ADMIN — SODIUM CHLORIDE SCH MLS/HR: 900 INJECTION INTRAVENOUS at 16:59

## 2022-06-01 RX ADMIN — DOCUSATE SODIUM SCH MG: 100 CAPSULE ORAL at 08:39

## 2022-06-01 RX ADMIN — SODIUM CHLORIDE SCH MLS/HR: 900 INJECTION INTRAVENOUS at 08:38

## 2022-06-01 RX ADMIN — LISINOPRIL SCH MG: 10 TABLET ORAL at 08:38

## 2022-06-01 RX ADMIN — SODIUM CHLORIDE SCH MLS/HR: 900 INJECTION INTRAVENOUS at 23:05

## 2022-06-01 RX ADMIN — METHYLPREDNISOLONE SODIUM SUCCINATE SCH MG: 40 INJECTION, POWDER, FOR SOLUTION INTRAMUSCULAR; INTRAVENOUS at 11:56

## 2022-06-01 RX ADMIN — SODIUM CHLORIDE SCH MLS/HR: 900 INJECTION, SOLUTION INTRAVENOUS at 20:19

## 2022-06-01 RX ADMIN — DEXMEDETOMIDINE HYDROCHLORIDE SCH MLS/HR: 100 INJECTION, SOLUTION, CONCENTRATE INTRAVENOUS at 22:46

## 2022-06-01 RX ADMIN — INSULIN ASPART SCH UNIT: 100 INJECTION, SOLUTION INTRAVENOUS; SUBCUTANEOUS at 05:12

## 2022-06-01 RX ADMIN — FAMOTIDINE SCH MG: 10 INJECTION INTRAVENOUS at 20:19

## 2022-06-01 RX ADMIN — ENOXAPARIN SODIUM SCH MG: 100 INJECTION SUBCUTANEOUS at 08:39

## 2022-06-01 RX ADMIN — IPRATROPIUM BROMIDE AND ALBUTEROL SULFATE SCH ML: .5; 3 SOLUTION RESPIRATORY (INHALATION) at 02:48

## 2022-06-01 RX ADMIN — IPRATROPIUM BROMIDE AND ALBUTEROL SULFATE SCH ML: .5; 3 SOLUTION RESPIRATORY (INHALATION) at 14:12

## 2022-06-01 RX ADMIN — METHYLPREDNISOLONE SODIUM SUCCINATE SCH MG: 40 INJECTION, POWDER, FOR SOLUTION INTRAMUSCULAR; INTRAVENOUS at 18:24

## 2022-06-01 RX ADMIN — INSULIN ASPART SCH UNIT: 100 INJECTION, SOLUTION INTRAVENOUS; SUBCUTANEOUS at 11:56

## 2022-06-01 RX ADMIN — ASCORBIC ACID, VITAMIN A PALMITATE, CHOLECALCIFEROL, THIAMINE HYDROCHLORIDE, RIBOFLAVIN-5 PHOSPHATE SODIUM, PYRIDOXINE HYDROCHLORIDE, NIACINAMIDE, DEXPANTHENOL, ALPHA-TOCOPHEROL ACETATE, VITAMIN K1, FOLIC ACID, BIOTIN, CYANOCOBALAMIN SCH MLS/HR: 200; 3300; 200; 6; 3.6; 6; 40; 15; 10; 150; 600; 60; 5 INJECTION, SOLUTION INTRAVENOUS at 09:42

## 2022-06-01 RX ADMIN — DEXMEDETOMIDINE HYDROCHLORIDE SCH MLS/HR: 100 INJECTION, SOLUTION, CONCENTRATE INTRAVENOUS at 12:41

## 2022-06-01 RX ADMIN — INSULIN ASPART SCH UNIT: 100 INJECTION, SOLUTION INTRAVENOUS; SUBCUTANEOUS at 18:25

## 2022-06-01 RX ADMIN — DEXMEDETOMIDINE HYDROCHLORIDE SCH MLS/HR: 100 INJECTION, SOLUTION, CONCENTRATE INTRAVENOUS at 02:35

## 2022-06-01 RX ADMIN — INSULIN ASPART SCH UNIT: 100 INJECTION, SOLUTION INTRAVENOUS; SUBCUTANEOUS at 23:06

## 2022-06-01 RX ADMIN — PROPOFOL SCH MLS/HR: 10 INJECTION, EMULSION INTRAVENOUS at 07:25

## 2022-06-01 RX ADMIN — IPRATROPIUM BROMIDE AND ALBUTEROL SULFATE SCH ML: .5; 3 SOLUTION RESPIRATORY (INHALATION) at 18:48

## 2022-06-01 RX ADMIN — IPRATROPIUM BROMIDE AND ALBUTEROL SULFATE SCH ML: .5; 3 SOLUTION RESPIRATORY (INHALATION) at 22:36

## 2022-06-01 NOTE — PROGRESS NOTE - HOSPITALIST
Subjective


HPI/CC On Admission


Date Seen by Provider:  Jun 1, 2022


Time Seen by Provider:  10:00


CC: SOB





HPI: This is a 62 yr old WM with a history of stroke with right sided 

hemiparesis. In addition to COPD. Pt continues to smoke. He presented to the ER 

in [place] with SOB. He was found to have exacerbation of COPD and acute on 

chronic respiratory failure. At this current time pt has remained stable and 

will be transferred to the 4th floor. IV steroids reviewed and pt will remain on

oxygen.


Subjective/Events-last exam


Pt required intubation after transferring up to the ICU


Could not tolerate the sedation vacation since tachypneic and tachycardic 

episode occurred


Vent settings are 450/20/5/35


ABG is 7.43/35/82





Focused Exam


Lactate Level


5/30/22 21:00: Lactic Acid Level 5.34*H


5/30/22 22:57: Lactic Acid Level 6.35*H


5/31/22 03:00: Lactic Acid Level 4.08*H








Objective


Exam


Vital Signs





Vital Signs








  Date Time  Temp Pulse Resp B/P (MAP) Pulse Ox O2 Delivery O2 Flow Rate FiO2


 


6/2/22 03:35     96 Mechanical Ventilator  30


 


6/2/22 03:35 37.6      30.00 


 


6/2/22 03:04  101      


 


6/2/22 03:00   24     





Capillary Refill : Less Than 3 Seconds


General Appearance:  No Apparent Distress, WD/WN, Chronically ill, Thin, Other 

(Sedated and intubated)


Respiratory:  No Accessory Muscle Use, No Respiratory Distress, Decreased Breath

Sounds


Cardiovascular:  Regular Rate, Rhythm





Results/Procedures


Lab


Laboratory Tests


6/2/22 04:37








Patient resulted labs reviewed.





Assessment/Plan


Assessment and Plan


Assess & Plan/Chief Complaint


Assessment:


Acute on chronic respiratory failure


Exacerbation of COPD


Alcohol withdrawal?


NSTEMI


Congestive heart failure?


Smoker


Marijuana use


Poor reserve


History of CVA with right-sided weakness





Plan:


BiPAP as needed


O2 supplement


Antibiotics


Steroids


Cardiology





6/1/2022:


Appreciate vent management


Supportive care


May need [place] considering he has end stage COPD





Diagnosis/Problems


Diagnosis/Problems





(1) Acute exacerbation of chronic obstructive pulmonary disease (COPD)


Status:  Acute


(2) History of cerebrovascular accident


(3) Nonrheumatic aortic valve stenosis with regurgitation


(4) Primary hypertension


(5) Mixed hyperlipidemia











CANDIS WONG DO                 Jun 1, 2022 06:41

## 2022-06-01 NOTE — DIAGNOSTIC IMAGING REPORT
INDICATION: Respiratory failure



AP view of the chest is obtained with comparison made to the

study of one day earlier.



Endotracheal tube is in place with tip below the thoracic inlet

and nasogastric tube passes below the diaphragm. Extensive

bilateral airspace disease has a similar overall appearance with

probable slight improvement in aeration of the lungs.



IMPRESSION: Mild overall improvement in aeration of lungs with

persistent bilateral pulmonary opacities which may be due to

edema or pneumonia.



Dictated by: 



  Dictated on workstation # [id number]

## 2022-06-01 NOTE — OCC THERAPY PROGRESS NOTE
Therapy Progress Note


Pt has been moved from 4th floor to ICU due to decline in medical status, 

physician sent new orders.  OT orders received.  Pt is currently intubated and 

OT will continue to monitor pt's status.  Will initiate treatment when pt is 

medically stable and able to actively participate in skilled therapy.











FRANSISCO MURRAY                Jun 1, 2022 06:57

## 2022-06-01 NOTE — PHYSICAL THERAPY PROGRESS NOTE
Therapy Progress Note


Patient was transferred to ICU and intubated.  Will discharge from PT services 

at this time.  Will need new orders for PT evaluation when appropriate and 

patient can participate in skilled PT intervention.











NORI BUCHANAN PT                 Jun 1, 2022 10:16

## 2022-06-01 NOTE — CARDIOLOGY PROGRESS NOTE
Progress Note-Cardiology


Events since last exam


Date Seen by Provider:  Jun 1, 2022


Time Seen by Provider:  09:35


Events since last exam


I am following him due to heart failure and possible NSTEMI.  On 5/31 he was 

intubated due to worsening respiratory failure.  He is now in the intensive care

unit intubated and sedated.  He has not required any vasoactive agents.  I 

cannot obtain any history from the patient due to the sedation.  I did speak 

with his nurse.  His family wants full medical care for the time being.





Certain portions of this document may have been dictated utilizing voice 

recognition technology.  Inherent to this technology, typographical and 

grammatical errors may exist.  As much as I am diligent to identify and correct 

these mistakes, some errors may remain in the document.





Vitals


Last set of Vitals Signs





Vital Signs








 6/1/22 6/1/22 6/1/22





 12:00 14:00 14:12


 


Temp 37.2  


 


Pulse   110


 


Resp   32


 


B/P (MAP)  141/81 (101) 


 


Pulse Ox   98


 


O2 Delivery  Mechanical Ventilator 


 


O2 Flow Rate  35.00 


 


FiO2   35











Labs


Labs


Laboratory Tests


5/31/22 18:35








6/1/22 04:22














Exam


Vital Signs





Vital Signs








  Date Time  Temp Pulse Resp B/P (MAP) Pulse Ox O2 Delivery O2 Flow Rate FiO2


 


6/1/22 14:12  110 32  98   35


 


6/1/22 14:00    141/81 (101)  Mechanical Ventilator 35.00 


 


6/1/22 12:00 37.2       








Physical Exam


General: Intubated and sedated.  He appears chronically ill and malnourished.


Eye:  Conjunctivae are clear. There are no xanthelasma.


HENT: Normocephalic. Atraumatic. Carotid pulsations 2/2 without bruits.


Neck: Jugular venous pressure does not appear elevated. No thyromegaly 

appreciated.


Respiratory: Symmetrical expansion bilaterally.  Coarse breath sounds due to the

ventilator.


Cardiovascular: Normal rate. Regular rhythm.  Distant S1/S2.  3/6 systolic 

ejection murmur. No gallop. Point of maximal impulse is not appear displaced. 

Good pulses equal in all extremities. No edema.


Gastrointestinal: Soft. Normal bowel sounds.


Skin: Skin turgor is normal. There is no pallor.


Musculoskeletal: No obvious deformities.


Neurologic: Intubated and sedated. 


Psychiatric: Not obtainable due to clinical status.


Labs





Laboratory Tests








Test


 5/31/22


15:34 5/31/22


17:30 5/31/22


18:35 5/31/22


19:46 Range/Units


 


 


Glucometer 144 H      MG/DL


 


Blood Gas Puncture Site  UNK   LT RADIAL   


 


Blood Gas Patient Temperature  37.2   36.5   


 


Arterial Blood pH  7.30 *L  7.35 L 7.37-7.43  


 


Arterial Blood Partial


Pressure CO2 


 42 


 


 39 


 35-45  MMHG





 


Arterial Blood Partial


Pressure O2 


 72 L


 


 86 


 79-93  MMHG





 


Arterial Blood HCO3  20 L  21 L 23-27  MMOL/L


 


Arterial Blood Total CO2


 


 21.3 


 


 22.5 


 21.0-31.0


MMOL/L


 


Arterial Blood Oxygen


Saturation 


 92 L


 


 96 


   %





 


Arterial Blood Base Excess


 


 -5.3 L


 


 -3.5 L


 -2.5-2.5


MMOL/L


 


Hansel Test  UNK   YES-POS   


 


Blood Gas Ventilator Setting  NO   YES   


 


Blood Gas Inspired Oxygen  60% BIPAP   60%   


 


White Blood Count


 


 


 21.2 H


 


 4.3-11.0


10^3/uL


 


Red Blood Count


 


 


 4.58 


 


 4.30-5.52


10^6/uL


 


Hemoglobin   15.3   13.3-17.7  g/dL


 


Hematocrit   45   40-54  %


 


Mean Corpuscular Volume   98   80-99  fL


 


Mean Corpuscular Hemoglobin   33   25-34  pg


 


Mean Corpuscular Hemoglobin


Concent 


 


 34 


 


 32-36  g/dL





 


Red Cell Distribution Width   13.8   10.0-14.5  %


 


Platelet Count


 


 


 186 


 


 130-400


10^3/uL


 


Mean Platelet Volume   9.8   9.0-12.2  fL


 


Sodium Level   134 L  135-145  MMOL/L


 


Potassium Level   4.1   3.6-5.0  MMOL/L


 


Chloride Level   101     MMOL/L


 


Carbon Dioxide Level   18 L  21-32  MMOL/L


 


Anion Gap   15 H  5-14  MMOL/L


 


Blood Urea Nitrogen   13   7-18  MG/DL


 


Creatinine


 


 


 0.80 


 


 0.60-1.30


MG/DL


 


Estimat Glomerular Filtration


Rate 


 


 101 


 


  





 


BUN/Creatinine Ratio   16    


 


Glucose Level   163 H    MG/DL


 


Calcium Level   8.3 L  8.5-10.1  MG/DL


 


Troponin I   0.380 *H  <0.028  NG/ML


 


B-Type Natriuretic Peptide   3233.8 H  <100.0  PG/ML


 


Test


 5/31/22


23:06 6/1/22


04:22 6/1/22


04:46 6/1/22


05:06 Range/Units


 


 


Glucometer 209 H   183 H   MG/DL


 


White Blood Count


 


 15.4 H


 


 


 4.3-11.0


10^3/uL


 


Red Blood Count


 


 4.27 L


 


 


 4.30-5.52


10^6/uL


 


Hemoglobin  14.1    13.3-17.7  g/dL


 


Hematocrit  41    40-54  %


 


Mean Corpuscular Volume  95    80-99  fL


 


Mean Corpuscular Hemoglobin  33    25-34  pg


 


Mean Corpuscular Hemoglobin


Concent 


 35 


 


 


 32-36  g/dL





 


Red Cell Distribution Width  13.6    10.0-14.5  %


 


Platelet Count


 


 152 


 


 


 130-400


10^3/uL


 


Mean Platelet Volume  10.1    9.0-12.2  fL


 


Immature Granulocyte % (Auto)  1     %


 


Neutrophils (%) (Auto)  92 H   42-75  %


 


Lymphocytes (%) (Auto)  3 L   12-44  %


 


Monocytes (%) (Auto)  5    0-12  %


 


Eosinophils (%) (Auto)  0    0-10  %


 


Basophils (%) (Auto)  0    0-10  %


 


Neutrophils # (Auto)


 


 14.1 H


 


 


 1.8-7.8


10^3/uL


 


Lymphocytes # (Auto)


 


 0.5 L


 


 


 1.0-4.0


10^3/uL


 


Monocytes # (Auto)


 


 0.7 


 


 


 0.0-1.0


10^3/uL


 


Eosinophils # (Auto)


 


 0.0 


 


 


 0.0-0.3


10^3/uL


 


Basophils # (Auto)


 


 0.0 


 


 


 0.0-0.1


10^3/uL


 


Immature Granulocyte # (Auto)


 


 0.1 


 


 


 0.0-0.1


10^3/uL


 


Sodium Level  135    135-145  MMOL/L


 


Potassium Level  3.8    3.6-5.0  MMOL/L


 


Chloride Level  101      MMOL/L


 


Carbon Dioxide Level  18 L   21-32  MMOL/L


 


Anion Gap  16 H   5-14  MMOL/L


 


Blood Urea Nitrogen  12    7-18  MG/DL


 


Creatinine


 


 0.81 


 


 


 0.60-1.30


MG/DL


 


Estimat Glomerular Filtration


Rate 


 100 


 


 


  





 


BUN/Creatinine Ratio  15     


 


Glucose Level  200 H     MG/DL


 


Calcium Level  8.1 L   8.5-10.1  MG/DL


 


Corrected Calcium  8.6    8.5-10.1  MG/DL


 


Magnesium Level  2.4    1.6-2.4  MG/DL


 


Total Bilirubin  0.6    0.1-1.0  MG/DL


 


Aspartate Amino Transf


(AST/SGOT) 


 34 


 


 


 5-34  U/L





 


Alanine Aminotransferase


(ALT/SGPT) 


 20 


 


 


 0-55  U/L





 


Alkaline Phosphatase  53      U/L


 


Total Protein  6.4    6.4-8.2  GM/DL


 


Albumin  3.4    3.2-4.5  GM/DL


 


Blood Gas Puncture Site   LEFT RADIAL    


 


Blood Gas Patient Temperature   37.4    


 


Arterial Blood pH   7.43   7.37-7.43  


 


Arterial Blood Partial


Pressure CO2 


 


 35 


 


 35-45  MMHG





 


Arterial Blood Partial


Pressure O2 


 


 82 


 


 79-93  MMHG





 


Arterial Blood HCO3   23   23-27  MMOL/L


 


Arterial Blood Total CO2


 


 


 23.6 


 


 21.0-31.0


MMOL/L


 


Arterial Blood Oxygen


Saturation 


 


 97 


 


   %





 


Arterial Blood Base Excess


 


 


 -1.1 


 


 -2.5-2.5


MMOL/L


 


Hansel Test   YES-POS    


 


Blood Gas Ventilator Setting   YES    


 


Blood Gas Inspired Oxygen   35%    


 


Test


 6/1/22


11:51 


 


 


 Range/Units


 


 


Glucometer 234 H      MG/DL











Diagnosis/Problems


Diagnosis/Problems





(1) Non-ST elevation myocardial infarction (NSTEMI), initial care episode


Assessment & Plan:  His troponin levels are elevated although flat.  The 

troponin elevation raises a concern about a non-ST elevation myocardial 

infarction although the patient was not complaining of chest pain and there are 

no ischemic changes on his electrocardiogram.  This could be a type II non-ST 

elevation myocardial infarction due to his acute respiratory failure.  However, 

I cannot completely exclude a type I non-ST elevation myocardial infarction.  I 

recommend he continue on aspirin which he was taking at home.  I ordered statin 

medication which she was also taking at home.  Due to his acute respiratory 

failure most likely due to chronic obstructive pulmonary disease, I will hold 

off on giving him a beta-blocker at this time since this could cause worsening 

bronchospasm.  Assuming he recovers from the acute respiratory illness, he will 

ultimately need to be considered for an ischemic evaluation.  However, in his 

present state, he has not an ideal candidate for noninvasive or an invasive 

coronary ischemic evaluation.





(2) Acute heart failure with preserved ejection fraction (HFpEF)


Assessment & Plan:  He does have an elevated BNP level and he is short of 

breath.  However, this is in the setting of an acute exacerbation of his chronic

obstructive pulmonary disease with acute respiratory failure.  Some of the BNP 

elevation may also be due to aortic stenosis.  He received a dose of IV Lasix at

the outside emergency room.  I gave him another dose of intravenous furosemide 

on 5/31.





(3) Nonrheumatic aortic valve stenosis with regurgitation


Assessment & Plan:  His echocardiogram is consistent with significant aortic 

stenosis as well as regurgitation.  Assuming he recovers from the pulmonary 

condition, we may need to consider whether or not to evaluate him for any sort 

of aortic valve intervention.  This could most likely be done electively.





(4) Primary hypertension


Assessment & Plan:  I restarted his lisinopril.  As above, I do not think he is 

good candidate for beta-blocker at this time due to his acute respiratory 

failure likely due to his underlying pulmonary disease.





(5) Mixed hyperlipidemia


Assessment & Plan:  I have ordered statin medication which she appears to be 

taking at home





(6) Acute respiratory failure with hypoxia


Status:  Acute


Assessment & Plan:  Most likely due to chronic obstructive pulmonary disease 

with perhaps some degree of heart failure as noted above.  The hospitalist and 

eICU are managing the ventilator





(7) History of cerebrovascular accident


Assessment & Plan:  He has a remote history of a stroke.  He should continue 

aspirin and statin medication.  He has not had any recent neurologic complaints.





(8) Cigarette smoker


Assessment & Plan:  He needs to quit smoking.  He was previously counseled in 

this regard.














RANJANA RIVERA JR, MD          Jun 1, 2022 09:36

## 2022-06-02 VITALS — DIASTOLIC BLOOD PRESSURE: 79 MMHG | SYSTOLIC BLOOD PRESSURE: 137 MMHG

## 2022-06-02 VITALS — DIASTOLIC BLOOD PRESSURE: 72 MMHG | SYSTOLIC BLOOD PRESSURE: 108 MMHG

## 2022-06-02 VITALS — SYSTOLIC BLOOD PRESSURE: 135 MMHG | DIASTOLIC BLOOD PRESSURE: 79 MMHG

## 2022-06-02 VITALS — DIASTOLIC BLOOD PRESSURE: 78 MMHG | SYSTOLIC BLOOD PRESSURE: 144 MMHG

## 2022-06-02 VITALS — SYSTOLIC BLOOD PRESSURE: 137 MMHG | DIASTOLIC BLOOD PRESSURE: 79 MMHG

## 2022-06-02 VITALS — SYSTOLIC BLOOD PRESSURE: 137 MMHG | DIASTOLIC BLOOD PRESSURE: 67 MMHG

## 2022-06-02 VITALS — DIASTOLIC BLOOD PRESSURE: 79 MMHG | SYSTOLIC BLOOD PRESSURE: 132 MMHG

## 2022-06-02 VITALS — DIASTOLIC BLOOD PRESSURE: 79 MMHG | SYSTOLIC BLOOD PRESSURE: 133 MMHG

## 2022-06-02 VITALS — SYSTOLIC BLOOD PRESSURE: 141 MMHG | DIASTOLIC BLOOD PRESSURE: 82 MMHG

## 2022-06-02 VITALS — DIASTOLIC BLOOD PRESSURE: 83 MMHG | SYSTOLIC BLOOD PRESSURE: 149 MMHG

## 2022-06-02 VITALS — DIASTOLIC BLOOD PRESSURE: 71 MMHG | SYSTOLIC BLOOD PRESSURE: 113 MMHG

## 2022-06-02 VITALS — DIASTOLIC BLOOD PRESSURE: 80 MMHG | SYSTOLIC BLOOD PRESSURE: 146 MMHG

## 2022-06-02 VITALS — DIASTOLIC BLOOD PRESSURE: 76 MMHG | SYSTOLIC BLOOD PRESSURE: 135 MMHG

## 2022-06-02 VITALS — DIASTOLIC BLOOD PRESSURE: 78 MMHG | SYSTOLIC BLOOD PRESSURE: 132 MMHG

## 2022-06-02 VITALS — DIASTOLIC BLOOD PRESSURE: 80 MMHG | SYSTOLIC BLOOD PRESSURE: 137 MMHG

## 2022-06-02 VITALS — DIASTOLIC BLOOD PRESSURE: 83 MMHG | SYSTOLIC BLOOD PRESSURE: 144 MMHG

## 2022-06-02 VITALS — SYSTOLIC BLOOD PRESSURE: 145 MMHG | DIASTOLIC BLOOD PRESSURE: 82 MMHG

## 2022-06-02 VITALS — SYSTOLIC BLOOD PRESSURE: 148 MMHG | DIASTOLIC BLOOD PRESSURE: 84 MMHG

## 2022-06-02 VITALS — DIASTOLIC BLOOD PRESSURE: 81 MMHG | SYSTOLIC BLOOD PRESSURE: 135 MMHG

## 2022-06-02 VITALS — SYSTOLIC BLOOD PRESSURE: 145 MMHG | DIASTOLIC BLOOD PRESSURE: 81 MMHG

## 2022-06-02 VITALS — DIASTOLIC BLOOD PRESSURE: 78 MMHG | SYSTOLIC BLOOD PRESSURE: 138 MMHG

## 2022-06-02 VITALS — SYSTOLIC BLOOD PRESSURE: 142 MMHG | DIASTOLIC BLOOD PRESSURE: 79 MMHG

## 2022-06-02 VITALS — SYSTOLIC BLOOD PRESSURE: 118 MMHG | DIASTOLIC BLOOD PRESSURE: 74 MMHG

## 2022-06-02 VITALS — SYSTOLIC BLOOD PRESSURE: 136 MMHG | DIASTOLIC BLOOD PRESSURE: 75 MMHG

## 2022-06-02 VITALS — DIASTOLIC BLOOD PRESSURE: 87 MMHG | SYSTOLIC BLOOD PRESSURE: 138 MMHG

## 2022-06-02 VITALS — SYSTOLIC BLOOD PRESSURE: 149 MMHG | DIASTOLIC BLOOD PRESSURE: 86 MMHG

## 2022-06-02 VITALS — SYSTOLIC BLOOD PRESSURE: 131 MMHG | DIASTOLIC BLOOD PRESSURE: 78 MMHG

## 2022-06-02 VITALS — SYSTOLIC BLOOD PRESSURE: 146 MMHG | DIASTOLIC BLOOD PRESSURE: 81 MMHG

## 2022-06-02 VITALS — DIASTOLIC BLOOD PRESSURE: 80 MMHG | SYSTOLIC BLOOD PRESSURE: 144 MMHG

## 2022-06-02 VITALS — SYSTOLIC BLOOD PRESSURE: 131 MMHG | DIASTOLIC BLOOD PRESSURE: 76 MMHG

## 2022-06-02 LAB
ALBUMIN SERPL-MCNC: 3.1 GM/DL (ref 3.2–4.5)
ALP SERPL-CCNC: 49 U/L (ref 40–136)
ALT SERPL-CCNC: 22 U/L (ref 0–55)
ARTERIAL PATENCY WRIST A: (no result)
BASE EXCESS STD BLDA CALC-SCNC: -2 MMOL/L (ref -2.5–2.5)
BASOPHILS # BLD AUTO: 0 10^3/UL (ref 0–0.1)
BASOPHILS NFR BLD AUTO: 0 % (ref 0–10)
BASOPHILS NFR BLD MANUAL: 0 %
BDY SITE: (no result)
BILIRUB SERPL-MCNC: 0.8 MG/DL (ref 0.1–1)
BODY TEMPERATURE: 37.5
BUN/CREAT SERPL: 15
CALCIUM SERPL-MCNC: 7.9 MG/DL (ref 8.5–10.1)
CHLORIDE SERPL-SCNC: 101 MMOL/L (ref 98–107)
CO2 BLDA CALC-SCNC: 22.3 MMOL/L (ref 21–31)
CO2 SERPL-SCNC: 19 MMOL/L (ref 21–32)
CREAT SERPL-MCNC: 0.71 MG/DL (ref 0.6–1.3)
EOSINOPHIL # BLD AUTO: 0 10^3/UL (ref 0–0.3)
EOSINOPHIL NFR BLD AUTO: 0 % (ref 0–10)
EOSINOPHIL NFR BLD MANUAL: 0 %
GFR SERPLBLD BASED ON 1.73 SQ M-ARVRAT: 104 ML/MIN
GLUCOSE SERPL-MCNC: 175 MG/DL (ref 70–105)
HCT VFR BLD CALC: 39 % (ref 40–54)
HGB BLD-MCNC: 13.6 G/DL (ref 13.3–17.7)
INHALED O2 FLOW RATE: (no result) L/MIN
LYMPHOCYTES # BLD AUTO: 0.6 10^3/UL (ref 1–4)
LYMPHOCYTES NFR BLD AUTO: 4 % (ref 12–44)
MAGNESIUM SERPL-MCNC: 2.3 MG/DL (ref 1.6–2.4)
MANUAL DIFFERENTIAL PERFORMED BLD QL: YES
MCH RBC QN AUTO: 33 PG (ref 25–34)
MCHC RBC AUTO-ENTMCNC: 35 G/DL (ref 32–36)
MCV RBC AUTO: 96 FL (ref 80–99)
MONOCYTES # BLD AUTO: 0.6 10^3/UL (ref 0–1)
MONOCYTES NFR BLD AUTO: 4 % (ref 0–12)
MONOCYTES NFR BLD: 3 %
NEUTROPHILS # BLD AUTO: 12.5 10^3/UL (ref 1.8–7.8)
NEUTROPHILS NFR BLD AUTO: 91 % (ref 42–75)
NEUTS BAND NFR BLD MANUAL: 86 %
NEUTS BAND NFR BLD: 0 %
PCO2 BLDA: 31 MMHG (ref 35–45)
PH BLDA: 7.45 [PH] (ref 7.37–7.43)
PLATELET # BLD: 128 10^3/UL (ref 130–400)
PMV BLD AUTO: 10.3 FL (ref 9–12.2)
PO2 BLDA: 76 MMHG (ref 79–93)
POTASSIUM SERPL-SCNC: 3.7 MMOL/L (ref 3.6–5)
PROT SERPL-MCNC: 5.9 GM/DL (ref 6.4–8.2)
RBC MORPH BLD: NORMAL
SAO2 % BLDA FROM PO2: 96 % (ref 94–100)
SODIUM SERPL-SCNC: 134 MMOL/L (ref 135–145)
VARIANT LYMPHS NFR BLD MANUAL: 11 %
VENTILATION MODE VENT: YES
WBC # BLD AUTO: 13.7 10^3/UL (ref 4.3–11)

## 2022-06-02 RX ADMIN — PROPOFOL SCH MLS/HR: 10 INJECTION, EMULSION INTRAVENOUS at 11:17

## 2022-06-02 RX ADMIN — DOCUSATE SODIUM SCH MG: 100 CAPSULE ORAL at 09:41

## 2022-06-02 RX ADMIN — IPRATROPIUM BROMIDE AND ALBUTEROL SULFATE SCH ML: .5; 3 SOLUTION RESPIRATORY (INHALATION) at 22:26

## 2022-06-02 RX ADMIN — LISINOPRIL SCH MG: 10 TABLET ORAL at 09:26

## 2022-06-02 RX ADMIN — INSULIN ASPART SCH UNIT: 100 INJECTION, SOLUTION INTRAVENOUS; SUBCUTANEOUS at 18:09

## 2022-06-02 RX ADMIN — ASPIRIN 81 MG CHEWABLE TABLET SCH MG: 81 TABLET CHEWABLE at 09:26

## 2022-06-02 RX ADMIN — INSULIN ASPART SCH UNIT: 100 INJECTION, SOLUTION INTRAVENOUS; SUBCUTANEOUS at 12:16

## 2022-06-02 RX ADMIN — SODIUM CHLORIDE SCH MLS/HR: 900 INJECTION INTRAVENOUS at 16:06

## 2022-06-02 RX ADMIN — LORAZEPAM PRN MG: 2 INJECTION INTRAMUSCULAR; INTRAVENOUS at 00:30

## 2022-06-02 RX ADMIN — IPRATROPIUM BROMIDE AND ALBUTEROL SULFATE SCH ML: .5; 3 SOLUTION RESPIRATORY (INHALATION) at 10:27

## 2022-06-02 RX ADMIN — IPRATROPIUM BROMIDE AND ALBUTEROL SULFATE SCH ML: .5; 3 SOLUTION RESPIRATORY (INHALATION) at 07:13

## 2022-06-02 RX ADMIN — IPRATROPIUM BROMIDE AND ALBUTEROL SULFATE SCH ML: .5; 3 SOLUTION RESPIRATORY (INHALATION) at 19:26

## 2022-06-02 RX ADMIN — ASCORBIC ACID, VITAMIN A PALMITATE, CHOLECALCIFEROL, THIAMINE HYDROCHLORIDE, RIBOFLAVIN-5 PHOSPHATE SODIUM, PYRIDOXINE HYDROCHLORIDE, NIACINAMIDE, DEXPANTHENOL, ALPHA-TOCOPHEROL ACETATE, VITAMIN K1, FOLIC ACID, BIOTIN, CYANOCOBALAMIN SCH MLS/HR: 200; 3300; 200; 6; 3.6; 6; 40; 15; 10; 150; 600; 60; 5 INJECTION, SOLUTION INTRAVENOUS at 09:30

## 2022-06-02 RX ADMIN — ENOXAPARIN SODIUM SCH MG: 100 INJECTION SUBCUTANEOUS at 09:26

## 2022-06-02 RX ADMIN — METHYLPREDNISOLONE SODIUM SUCCINATE SCH MG: 40 INJECTION, POWDER, FOR SOLUTION INTRAMUSCULAR; INTRAVENOUS at 12:05

## 2022-06-02 RX ADMIN — SODIUM CHLORIDE SCH MLS/HR: 900 INJECTION, SOLUTION INTRAVENOUS at 21:50

## 2022-06-02 RX ADMIN — SODIUM CHLORIDE SCH MLS/HR: 900 INJECTION INTRAVENOUS at 09:27

## 2022-06-02 RX ADMIN — POTASSIUM CHLORIDE SCH MEQ: 1500 TABLET, EXTENDED RELEASE ORAL at 05:10

## 2022-06-02 RX ADMIN — POTASSIUM CHLORIDE SCH MLS/HR: 200 INJECTION, SOLUTION INTRAVENOUS at 05:09

## 2022-06-02 RX ADMIN — FAMOTIDINE SCH MG: 10 INJECTION INTRAVENOUS at 09:26

## 2022-06-02 RX ADMIN — PROPOFOL SCH MLS/HR: 10 INJECTION, EMULSION INTRAVENOUS at 23:56

## 2022-06-02 RX ADMIN — FAMOTIDINE SCH MG: 10 INJECTION INTRAVENOUS at 21:50

## 2022-06-02 RX ADMIN — IPRATROPIUM BROMIDE AND ALBUTEROL SULFATE SCH ML: .5; 3 SOLUTION RESPIRATORY (INHALATION) at 02:53

## 2022-06-02 RX ADMIN — IPRATROPIUM BROMIDE AND ALBUTEROL SULFATE SCH ML: .5; 3 SOLUTION RESPIRATORY (INHALATION) at 15:21

## 2022-06-02 RX ADMIN — ENOXAPARIN SODIUM SCH MG: 100 INJECTION SUBCUTANEOUS at 21:50

## 2022-06-02 RX ADMIN — PROPOFOL SCH MLS/HR: 10 INJECTION, EMULSION INTRAVENOUS at 03:04

## 2022-06-02 RX ADMIN — DEXMEDETOMIDINE HYDROCHLORIDE SCH MLS/HR: 100 INJECTION, SOLUTION, CONCENTRATE INTRAVENOUS at 17:50

## 2022-06-02 RX ADMIN — DOCUSATE SODIUM SCH MG: 100 CAPSULE ORAL at 22:29

## 2022-06-02 RX ADMIN — METHYLPREDNISOLONE SODIUM SUCCINATE SCH MG: 40 INJECTION, POWDER, FOR SOLUTION INTRAMUSCULAR; INTRAVENOUS at 17:50

## 2022-06-02 RX ADMIN — METHYLPREDNISOLONE SODIUM SUCCINATE SCH MG: 40 INJECTION, POWDER, FOR SOLUTION INTRAMUSCULAR; INTRAVENOUS at 05:26

## 2022-06-02 RX ADMIN — METOPROLOL TARTRATE SCH MG: 25 TABLET, FILM COATED ORAL at 21:50

## 2022-06-02 RX ADMIN — INSULIN ASPART SCH UNIT: 100 INJECTION, SOLUTION INTRAVENOUS; SUBCUTANEOUS at 05:10

## 2022-06-02 RX ADMIN — DEXMEDETOMIDINE HYDROCHLORIDE SCH MLS/HR: 100 INJECTION, SOLUTION, CONCENTRATE INTRAVENOUS at 09:29

## 2022-06-02 RX ADMIN — PROPOFOL SCH MLS/HR: 10 INJECTION, EMULSION INTRAVENOUS at 17:49

## 2022-06-02 RX ADMIN — MAGNESIUM SULFATE IN DEXTROSE SCH MLS/HR: 10 INJECTION, SOLUTION INTRAVENOUS at 05:17

## 2022-06-02 NOTE — OCC THERAPY PROGRESS NOTE
Therapy Progress Note


Pt is currently intubated and OT will continue to monitor pt's status.  Will 

initiate treatment when pt is medically stable and able to actively participate 

in skilled therapy.











FRANSISCO MURRAY                Jun 2, 2022 06:59

## 2022-06-02 NOTE — CARDIOLOGY PROGRESS NOTE
Progress Note-Cardiology


Events since last exam


Date Seen by Provider:  Jun 2, 2022


Time Seen by Provider:  09:06


Events since last exam


I am following him due to heart failure and NSTEMI.  He remains in the intensive

care unit intubated and sedated.  His oxygenation has improved.  This morning 

the nurse had him on a sedation holiday but he became tachypneic and 

tachycardic.  He was not following commands.  He was placed back on full 

sedation.  Yesterday, he had been following commands when sedation was weaned.  

Last evening he had a questionable seizure and was given 1 dose of Ativan.  He 

is not on any vasoactive agents.  I could not obtain any history from the 

patient due to his sedation.





Certain portions of this document may have been dictated utilizing voice 

recognition technology.  Inherent to this technology, typographical and 

grammatical errors may exist.  As much as I am diligent to identify and correct 

these mistakes, some errors may remain in the document.





Vitals


Last set of Vitals Signs





Vital Signs








 6/2/22 6/2/22 6/2/22 6/2/22





 07:33 10:27 11:00 11:17


 


Temp 36.0   


 


Pulse    109


 


Resp   20 


 


B/P (MAP)    141/83


 


Pulse Ox   98 


 


O2 Delivery   Mechanical Ventilator 


 


O2 Flow Rate   30.00 


 


FiO2  30  











Labs


Labs


Laboratory Tests


6/2/22 04:37














Exam


Vital Signs





Vital Signs








  Date Time  Temp Pulse Resp B/P (MAP) Pulse Ox O2 Delivery O2 Flow Rate FiO2


 


6/2/22 11:17  109  141/83    


 


6/2/22 11:00   20  98 Mechanical Ventilator 30.00 


 


6/2/22 10:27        30


 


6/2/22 07:33 36.0       








Physical Exam


General: Intubated and sedated.  He appears malnourished and older than his 

stated age.


Eye:  Conjunctivae are clear. There are no xanthelasma.


HENT: Normocephalic. Atraumatic. Carotid pulsations 2/2 without bruits.


Neck: Jugular venous pressure does not appear elevated. No thyromegaly 

appreciated.


Respiratory: Symmetrical expansion bilaterally.  Coarse breath sounds due to the

ventilator.


Cardiovascular: Normal rate. Regular rhythm.  Distant S1/S2.  2/6 systolic 

ejection murmur. No gallop. Point of maximal impulse is not appear displaced. 

Good pulses equal in all extremities. No edema.


Gastrointestinal: Soft. Normal bowel sounds.


Skin: Skin turgor is normal. There is no pallor.


Musculoskeletal: No obvious deformities.


Neurologic: Intubated and sedated. 


Psychiatric: Not obtainable due to clinical status.


Labs





Laboratory Tests








Test


 6/1/22


11:51 6/1/22


17:51 6/1/22


22:53 6/2/22


04:37 Range/Units


 


 


Glucometer 234 H 189 H 157 H    MG/DL


 


White Blood Count


 


 


 


 13.7 H


 4.3-11.0


10^3/uL


 


Red Blood Count


 


 


 


 4.08 L


 4.30-5.52


10^6/uL


 


Hemoglobin    13.6  13.3-17.7  g/dL


 


Hematocrit    39 L 40-54  %


 


Mean Corpuscular Volume    96  80-99  fL


 


Mean Corpuscular Hemoglobin    33  25-34  pg


 


Mean Corpuscular Hemoglobin


Concent 


 


 


 35 


 32-36  g/dL





 


Red Cell Distribution Width    13.8  10.0-14.5  %


 


Platelet Count


 


 


 


 128 L


 130-400


10^3/uL


 


Mean Platelet Volume    10.3  9.0-12.2  fL


 


Immature Granulocyte % (Auto)    0   %


 


Neutrophils (%) (Auto)    91 H 42-75  %


 


Lymphocytes (%) (Auto)    4 L 12-44  %


 


Monocytes (%) (Auto)    4  0-12  %


 


Eosinophils (%) (Auto)    0  0-10  %


 


Basophils (%) (Auto)    0  0-10  %


 


Neutrophils # (Auto)


 


 


 


 12.5 H


 1.8-7.8


10^3/uL


 


Lymphocytes # (Auto)


 


 


 


 0.6 L


 1.0-4.0


10^3/uL


 


Monocytes # (Auto)


 


 


 


 0.6 


 0.0-1.0


10^3/uL


 


Eosinophils # (Auto)


 


 


 


 0.0 


 0.0-0.3


10^3/uL


 


Basophils # (Auto)


 


 


 


 0.0 


 0.0-0.1


10^3/uL


 


Immature Granulocyte # (Auto)


 


 


 


 0.1 


 0.0-0.1


10^3/uL


 


Neutrophils % (Manual)    86   %


 


Lymphocytes % (Manual)    11   %


 


Monocytes % (Manual)    3   %


 


Eosinophils % (Manual)    0   %


 


Basophils % (Manual)    0   %


 


Band Neutrophils    0   %


 


Blood Morphology Comment    NORMAL   


 


Sodium Level    134 L 135-145  MMOL/L


 


Potassium Level    3.7  3.6-5.0  MMOL/L


 


Chloride Level    101    MMOL/L


 


Carbon Dioxide Level    19 L 21-32  MMOL/L


 


Anion Gap    14  5-14  MMOL/L


 


Blood Urea Nitrogen    11  7-18  MG/DL


 


Creatinine


 


 


 


 0.71 


 0.60-1.30


MG/DL


 


Estimat Glomerular Filtration


Rate 


 


 


 104 


  





 


BUN/Creatinine Ratio    15   


 


Glucose Level    175 H   MG/DL


 


Calcium Level    7.9 L 8.5-10.1  MG/DL


 


Corrected Calcium    8.6  8.5-10.1  MG/DL


 


Magnesium Level    2.3  1.6-2.4  MG/DL


 


Total Bilirubin    0.8  0.1-1.0  MG/DL


 


Aspartate Amino Transf


(AST/SGOT) 


 


 


 22 


 5-34  U/L





 


Alanine Aminotransferase


(ALT/SGPT) 


 


 


 22 


 0-55  U/L





 


Alkaline Phosphatase    49    U/L


 


Total Protein    5.9 L 6.4-8.2  GM/DL


 


Albumin    3.1 L 3.2-4.5  GM/DL


 


Test


 6/2/22


04:39 


 


 


 Range/Units


 


 


Blood Gas Puncture Site RRAD      


 


Blood Gas Patient Temperature 37.5      


 


Arterial Blood pH 7.45 H    7.37-7.43  


 


Arterial Blood Partial


Pressure CO2 31 L


 


 


 


 35-45  MMHG





 


Arterial Blood Partial


Pressure O2 76 L


 


 


 


 79-93  MMHG





 


Arterial Blood HCO3 21 L    23-27  MMOL/L


 


Arterial Blood Total CO2


 22.3 


 


 


 


 21.0-31.0


MMOL/L


 


Arterial Blood Oxygen


Saturation 96 


 


 


 


   %





 


Arterial Blood Base Excess


 -2.0 


 


 


 


 -2.5-2.5


MMOL/L


 


Hansel Test YES-POS      


 


Blood Gas Ventilator Setting YES      


 


Blood Gas Inspired Oxygen 30%      











Diagnosis/Problems


Diagnosis/Problems





(1) Non-ST elevation myocardial infarction (NSTEMI), initial care episode


Assessment & Plan:  His troponin levels were elevated although flat.  The 

troponin elevation raises a concern about a non-ST elevation myocardial 

infarction although the patient was not complaining of chest pain and there are 

no ischemic changes on his electrocardiogram.  This could be a type II non-ST 

elevation myocardial infarction due to his acute respiratory failure.  However, 

I cannot completely exclude a type I non-ST elevation myocardial infarction.  I 

recommend he continue on aspirin which he was taking at home.  I ordered statin 

medication which he was also taking at home.  Since his pulmonary status now 

seems to be improving, low-dose beta-blocker.  However, if this causes 

bronchospasm or increased oxygen requirements, we will need to stop this.  He is

also receiving therapeutic dosing of enoxaparin.  Assuming he recovers from the 

acute respiratory illness, he will ultimately need to be considered for an 

ischemic evaluation.  However, in his present state, he has not an ideal 

candidate for noninvasive or an invasive coronary ischemic evaluation.





(2) Acute heart failure with preserved ejection fraction (HFpEF)


Assessment & Plan:  He does have an elevated BNP level and he was short of breat

h. However, this is in the setting of an acute exacerbation of his chronic 

obstructive pulmonary disease with acute respiratory failure.  Some of the BNP 

elevation may also be due to aortic stenosis.  He underwent an echocardiogram 

during this admission that shows a normal ejection fraction.   He received a 

dose of IV Lasix at the outside emergency room.  I gave him another dose of 

intravenous furosemide on 5/31.





(3) Nonrheumatic aortic valve stenosis with regurgitation


Assessment & Plan:  His echocardiogram is consistent with significant aortic 

stenosis as well as regurgitation.  Assuming he recovers from the pulmonary 

condition, we may need to consider whether or not to evaluate him for any sort 

of aortic valve intervention.  This could most likely be done electively.





(4) Primary hypertension


Assessment & Plan:  I restarted his lisinopril.  I will try him on low-dose 

beta-blocker due to the possible myocardial infarction.





(5) Mixed hyperlipidemia


Assessment & Plan:  I have ordered statin medication which he appears to be 

taking at home





(6) Tonic clonic convulsion


Assessment & Plan:  He does not have a history of seizures.  He did have a 

baseline tremor prior to being intubated.  This could be a sign of an underlying

acute neurologic event.  I recommend a head CT stroke protocol.





(7) Acute respiratory failure with hypoxia


Status:  Acute


Assessment & Plan:  Most likely due to chronic obstructive pulmonary disease 

with perhaps some degree of heart failure as noted above.  The hospitalist and 

eICU are managing the ventilator





(8) History of cerebrovascular accident


Assessment & Plan:  He has a remote history of a stroke.  He should continue 

aspirin and statin medication.  He has not had any recent neurologic complaints 

but as above, given the possible tonic-clonic activity, I recommend a head CT.





(9) Cigarette smoker


Assessment & Plan:  He needs to quit smoking.  He was previously counseled in 

this regard.














RANJANA RIVERA JR, MD          Jun 2, 2022 09:08

## 2022-06-02 NOTE — PROGRESS NOTE - HOSPITALIST
Subjective


HPI/CC On Admission


Date Seen by Provider:  Jun 2, 2022


Time Seen by Provider:  11:00


CC: SOB





HPI: This is a 62 yr old WM with a history of stroke with right sided 

hemiparesis. In addition to COPD. Pt continues to smoke. He presented to the ER 

in [place] with SOB. He was found to have exacerbation of COPD and acute on 

chronic respiratory failure. At this current time pt has remained stable and 

will be transferred to the 4th floor. IV steroids reviewed and pt will remain on

oxygen.


Subjective/Events-last exam


Pt is still intubated


Vent settings are 450/16/5/30%


Can not tolerate sedation vacation


May be alcohol withdraw


CT shows no stroke





Focused Exam


Lactate Level








Objective


Exam


Vital Signs





Vital Signs








  Date Time  Temp Pulse Resp B/P (MAP) Pulse Ox O2 Delivery O2 Flow Rate FiO2


 


6/3/22 05:13  105  138/77    


 


6/3/22 05:00   24  100 Mechanical Ventilator 30.00 


 


6/3/22 04:00        30


 


6/3/22 01:53 37.7       





Capillary Refill : Less Than 3 Seconds


General Appearance:  No Apparent Distress, WD/WN, Chronically ill, Thin, Other 

(Sedated and intubated)


Respiratory:  Lungs Clear, Normal Breath Sounds





Results/Procedures


Lab


Laboratory Tests


6/3/22 05:10








Patient resulted labs reviewed.





Assessment/Plan


Assessment and Plan


Assess & Plan/Chief Complaint


Assessment:


Acute on chronic respiratory failure


Exacerbation of COPD


Alcohol withdrawal?


NSTEMI


Congestive heart failure?


Smoker


Marijuana use


Poor reserve


History of CVA with right-sided weakness





Plan:


BiPAP as needed


O2 supplement


Antibiotics


Steroids


Cardiology





6/1/2022:


Appreciate vent management


Supportive care


May need [place] considering he has end stage COPD





6/2/22:


Supportive care 


Ventilator management appreciated





Diagnosis/Problems


Diagnosis/Problems





(1) Acute exacerbation of chronic obstructive pulmonary disease (COPD)


Status:  Acute


(2) History of cerebrovascular accident


(3) Nonrheumatic aortic valve stenosis with regurgitation


(4) Primary hypertension


(5) Mixed hyperlipidemia











CANDIS WONG DO                 Jun 2, 2022 06:34

## 2022-06-02 NOTE — DIAGNOSTIC IMAGING REPORT
CLINICAL INDICATION: Patient with seizure-like activity. Patient

on vent. Rule out stroke.



EXAM: Axial CT scan of the brain without IV contrast with coronal

 and sagittal reformatted images. Auto Exposure Controls were

utilized during the CT exam to meet ALARA standards for radiation

dose reduction.



COMPARISON: None



FINDINGS:

There is no evidence of acute cerebral infarct, intracranial

hemorrhage, or gross mass effect. There is no dense vessel sign.



There is moderate-sized chronic cerebral infarct involving left

cerebral hemisphere in the left MCA distribution which involves

the left frontal and parietal lobe regions. There is chronic

infarct changes involving left basal ganglia region. There is

ex-vacuo dilation of the left lateral ventricle.



There are subtle patchy areas of low density involving white

matter of both cerebral hemispheres, likely representing chronic

small vessel ischemic disease.



The brain parenchymal volume appears appropriate for patient's

age. There is normal gray-white matter distinction.  There is no

significant midline shift or herniation.



There is no evidence of hydrocephalus. The basal cisterns are

unremarkable. The skull, extracranial soft tissue, and orbits are

unremarkable. There is mild mucosal thickening involving the

ethmoid sinus and left maxillary sinus. Temporal bones show no

significant abnormality.



IMPRESSION:

1: There is no definite evidence of acute intracranial process.

There is no dense vessel sign.



2: There is chronic cerebral infarct involving the left cerebral

hemisphere in the left MCA distribution.



3: Chronic small vessel ischemic disease.



Results of this report were discussed with Dr. Traci Mueller via

the telephone on 06/02/2022 at 1208 hours.



Dictated by: 



  Dictated on workstation # [id number]

## 2022-06-03 VITALS — DIASTOLIC BLOOD PRESSURE: 77 MMHG | SYSTOLIC BLOOD PRESSURE: 138 MMHG

## 2022-06-03 VITALS — SYSTOLIC BLOOD PRESSURE: 131 MMHG | DIASTOLIC BLOOD PRESSURE: 64 MMHG

## 2022-06-03 VITALS — DIASTOLIC BLOOD PRESSURE: 76 MMHG | SYSTOLIC BLOOD PRESSURE: 131 MMHG

## 2022-06-03 VITALS — DIASTOLIC BLOOD PRESSURE: 64 MMHG | SYSTOLIC BLOOD PRESSURE: 139 MMHG

## 2022-06-03 VITALS — DIASTOLIC BLOOD PRESSURE: 67 MMHG | SYSTOLIC BLOOD PRESSURE: 129 MMHG

## 2022-06-03 VITALS — DIASTOLIC BLOOD PRESSURE: 76 MMHG | SYSTOLIC BLOOD PRESSURE: 133 MMHG

## 2022-06-03 VITALS — DIASTOLIC BLOOD PRESSURE: 76 MMHG | SYSTOLIC BLOOD PRESSURE: 129 MMHG

## 2022-06-03 VITALS — SYSTOLIC BLOOD PRESSURE: 124 MMHG | DIASTOLIC BLOOD PRESSURE: 68 MMHG

## 2022-06-03 VITALS — DIASTOLIC BLOOD PRESSURE: 63 MMHG | SYSTOLIC BLOOD PRESSURE: 125 MMHG

## 2022-06-03 VITALS — SYSTOLIC BLOOD PRESSURE: 111 MMHG | DIASTOLIC BLOOD PRESSURE: 62 MMHG

## 2022-06-03 VITALS — SYSTOLIC BLOOD PRESSURE: 136 MMHG | DIASTOLIC BLOOD PRESSURE: 70 MMHG

## 2022-06-03 VITALS — DIASTOLIC BLOOD PRESSURE: 72 MMHG | SYSTOLIC BLOOD PRESSURE: 127 MMHG

## 2022-06-03 VITALS — SYSTOLIC BLOOD PRESSURE: 123 MMHG | DIASTOLIC BLOOD PRESSURE: 78 MMHG

## 2022-06-03 VITALS — DIASTOLIC BLOOD PRESSURE: 82 MMHG | SYSTOLIC BLOOD PRESSURE: 125 MMHG

## 2022-06-03 VITALS — DIASTOLIC BLOOD PRESSURE: 78 MMHG | SYSTOLIC BLOOD PRESSURE: 135 MMHG

## 2022-06-03 VITALS — SYSTOLIC BLOOD PRESSURE: 129 MMHG | DIASTOLIC BLOOD PRESSURE: 72 MMHG

## 2022-06-03 VITALS — SYSTOLIC BLOOD PRESSURE: 134 MMHG | DIASTOLIC BLOOD PRESSURE: 77 MMHG

## 2022-06-03 VITALS — DIASTOLIC BLOOD PRESSURE: 72 MMHG | SYSTOLIC BLOOD PRESSURE: 126 MMHG

## 2022-06-03 VITALS — SYSTOLIC BLOOD PRESSURE: 131 MMHG | DIASTOLIC BLOOD PRESSURE: 75 MMHG

## 2022-06-03 VITALS — DIASTOLIC BLOOD PRESSURE: 68 MMHG | SYSTOLIC BLOOD PRESSURE: 123 MMHG

## 2022-06-03 VITALS — SYSTOLIC BLOOD PRESSURE: 118 MMHG | DIASTOLIC BLOOD PRESSURE: 70 MMHG

## 2022-06-03 VITALS — SYSTOLIC BLOOD PRESSURE: 128 MMHG | DIASTOLIC BLOOD PRESSURE: 72 MMHG

## 2022-06-03 VITALS — DIASTOLIC BLOOD PRESSURE: 74 MMHG | SYSTOLIC BLOOD PRESSURE: 125 MMHG

## 2022-06-03 VITALS — DIASTOLIC BLOOD PRESSURE: 79 MMHG | SYSTOLIC BLOOD PRESSURE: 141 MMHG

## 2022-06-03 VITALS — DIASTOLIC BLOOD PRESSURE: 65 MMHG | SYSTOLIC BLOOD PRESSURE: 122 MMHG

## 2022-06-03 VITALS — SYSTOLIC BLOOD PRESSURE: 134 MMHG | DIASTOLIC BLOOD PRESSURE: 66 MMHG

## 2022-06-03 VITALS — DIASTOLIC BLOOD PRESSURE: 74 MMHG | SYSTOLIC BLOOD PRESSURE: 130 MMHG

## 2022-06-03 VITALS — SYSTOLIC BLOOD PRESSURE: 129 MMHG | DIASTOLIC BLOOD PRESSURE: 74 MMHG

## 2022-06-03 VITALS — SYSTOLIC BLOOD PRESSURE: 121 MMHG | DIASTOLIC BLOOD PRESSURE: 71 MMHG

## 2022-06-03 VITALS — DIASTOLIC BLOOD PRESSURE: 72 MMHG | SYSTOLIC BLOOD PRESSURE: 138 MMHG

## 2022-06-03 LAB
ALBUMIN SERPL-MCNC: 3.1 GM/DL (ref 3.2–4.5)
ALP SERPL-CCNC: 37 U/L (ref 40–136)
ALT SERPL-CCNC: 26 U/L (ref 0–55)
ARTERIAL PATENCY WRIST A: POSITIVE
BASE EXCESS STD BLDA CALC-SCNC: -3.4 MMOL/L (ref -2.5–2.5)
BASOPHILS # BLD AUTO: 0 10^3/UL (ref 0–0.1)
BASOPHILS NFR BLD AUTO: 0 % (ref 0–10)
BDY SITE: (no result)
BILIRUB SERPL-MCNC: 0.8 MG/DL (ref 0.1–1)
BODY TEMPERATURE: 37.3
BUN/CREAT SERPL: 18
CALCIUM SERPL-MCNC: 7.7 MG/DL (ref 8.5–10.1)
CHLORIDE SERPL-SCNC: 102 MMOL/L (ref 98–107)
CO2 BLDA CALC-SCNC: 20.8 MMOL/L (ref 21–31)
CO2 SERPL-SCNC: 21 MMOL/L (ref 21–32)
CREAT SERPL-MCNC: 0.83 MG/DL (ref 0.6–1.3)
EOSINOPHIL # BLD AUTO: 0 10^3/UL (ref 0–0.3)
EOSINOPHIL NFR BLD AUTO: 0 % (ref 0–10)
GFR SERPLBLD BASED ON 1.73 SQ M-ARVRAT: 100 ML/MIN
GLUCOSE SERPL-MCNC: 190 MG/DL (ref 70–105)
HCT VFR BLD CALC: 32 % (ref 40–54)
HGB BLD-MCNC: 12.1 G/DL (ref 13.3–17.7)
INHALED O2 FLOW RATE: (no result) L/MIN
LYMPHOCYTES # BLD AUTO: 0.5 10^3/UL (ref 1–4)
LYMPHOCYTES NFR BLD AUTO: 5 % (ref 12–44)
MAGNESIUM SERPL-MCNC: 2.5 MG/DL (ref 1.6–2.4)
MANUAL DIFFERENTIAL PERFORMED BLD QL: NO
MCH RBC QN AUTO: 38 PG (ref 25–34)
MCHC RBC AUTO-ENTMCNC: 38 G/DL (ref 32–36)
MCV RBC AUTO: 99 FL (ref 80–99)
MONOCYTES # BLD AUTO: 0.3 10^3/UL (ref 0–1)
MONOCYTES NFR BLD AUTO: 4 % (ref 0–12)
NEUTROPHILS # BLD AUTO: 8.4 10^3/UL (ref 1.8–7.8)
NEUTROPHILS NFR BLD AUTO: 90 % (ref 42–75)
PCO2 BLDA: 29 MMHG (ref 35–45)
PH BLDA: 7.45 [PH] (ref 7.37–7.43)
PLATELET # BLD: 132 10^3/UL (ref 130–400)
PMV BLD AUTO: 12.4 FL (ref 9–12.2)
PO2 BLDA: 72 MMHG (ref 79–93)
POTASSIUM SERPL-SCNC: 4 MMOL/L (ref 3.6–5)
PROT SERPL-MCNC: 5.7 GM/DL (ref 6.4–8.2)
SAO2 % BLDA FROM PO2: 96 % (ref 94–100)
SODIUM SERPL-SCNC: 134 MMOL/L (ref 135–145)
TRIGL SERPL-MCNC: 109 MG/DL (ref ?–150)
VENTILATION MODE VENT: YES
WBC # BLD AUTO: 9.3 10^3/UL (ref 4.3–11)

## 2022-06-03 RX ADMIN — DEXMEDETOMIDINE HYDROCHLORIDE SCH MLS/HR: 100 INJECTION, SOLUTION, CONCENTRATE INTRAVENOUS at 17:36

## 2022-06-03 RX ADMIN — FENTANYL CITRATE PRN MCG: 50 INJECTION, SOLUTION INTRAMUSCULAR; INTRAVENOUS at 10:12

## 2022-06-03 RX ADMIN — METHYLPREDNISOLONE SODIUM SUCCINATE SCH MG: 125 INJECTION, POWDER, FOR SOLUTION INTRAMUSCULAR; INTRAVENOUS at 22:05

## 2022-06-03 RX ADMIN — IPRATROPIUM BROMIDE AND ALBUTEROL SULFATE SCH ML: .5; 3 SOLUTION RESPIRATORY (INHALATION) at 10:51

## 2022-06-03 RX ADMIN — INSULIN ASPART SCH UNIT: 100 INJECTION, SOLUTION INTRAVENOUS; SUBCUTANEOUS at 18:00

## 2022-06-03 RX ADMIN — ASPIRIN 81 MG CHEWABLE TABLET SCH MG: 81 TABLET CHEWABLE at 09:19

## 2022-06-03 RX ADMIN — FENTANYL CITRATE PRN MCG: 50 INJECTION, SOLUTION INTRAMUSCULAR; INTRAVENOUS at 01:51

## 2022-06-03 RX ADMIN — INSULIN ASPART SCH UNIT: 100 INJECTION, SOLUTION INTRAVENOUS; SUBCUTANEOUS at 12:00

## 2022-06-03 RX ADMIN — PROPOFOL SCH MLS/HR: 10 INJECTION, EMULSION INTRAVENOUS at 05:13

## 2022-06-03 RX ADMIN — METOPROLOL TARTRATE SCH MG: 25 TABLET, FILM COATED ORAL at 09:19

## 2022-06-03 RX ADMIN — POTASSIUM CHLORIDE SCH MLS/HR: 200 INJECTION, SOLUTION INTRAVENOUS at 06:00

## 2022-06-03 RX ADMIN — SODIUM CHLORIDE SCH MLS/HR: 900 INJECTION INTRAVENOUS at 17:00

## 2022-06-03 RX ADMIN — LISINOPRIL SCH MG: 10 TABLET ORAL at 09:19

## 2022-06-03 RX ADMIN — MAGNESIUM SULFATE IN DEXTROSE SCH MLS/HR: 10 INJECTION, SOLUTION INTRAVENOUS at 06:00

## 2022-06-03 RX ADMIN — FENTANYL CITRATE PRN MCG: 50 INJECTION, SOLUTION INTRAMUSCULAR; INTRAVENOUS at 17:49

## 2022-06-03 RX ADMIN — SODIUM CHLORIDE SCH MLS/HR: 900 INJECTION INTRAVENOUS at 07:55

## 2022-06-03 RX ADMIN — METHYLPREDNISOLONE SODIUM SUCCINATE SCH MG: 125 INJECTION, POWDER, FOR SOLUTION INTRAMUSCULAR; INTRAVENOUS at 14:16

## 2022-06-03 RX ADMIN — ENOXAPARIN SODIUM SCH MG: 100 INJECTION SUBCUTANEOUS at 09:19

## 2022-06-03 RX ADMIN — LORAZEPAM PRN MG: 2 INJECTION INTRAMUSCULAR; INTRAVENOUS at 10:13

## 2022-06-03 RX ADMIN — FAMOTIDINE SCH MG: 10 INJECTION INTRAVENOUS at 09:19

## 2022-06-03 RX ADMIN — DEXMEDETOMIDINE HYDROCHLORIDE SCH MLS/HR: 100 INJECTION, SOLUTION, CONCENTRATE INTRAVENOUS at 09:51

## 2022-06-03 RX ADMIN — IPRATROPIUM BROMIDE AND ALBUTEROL SULFATE SCH ML: .5; 3 SOLUTION RESPIRATORY (INHALATION) at 21:01

## 2022-06-03 RX ADMIN — INSULIN ASPART SCH UNIT: 100 INJECTION, SOLUTION INTRAVENOUS; SUBCUTANEOUS at 00:43

## 2022-06-03 RX ADMIN — LORAZEPAM PRN MG: 2 INJECTION INTRAMUSCULAR; INTRAVENOUS at 20:23

## 2022-06-03 RX ADMIN — IPRATROPIUM BROMIDE AND ALBUTEROL SULFATE SCH ML: .5; 3 SOLUTION RESPIRATORY (INHALATION) at 19:27

## 2022-06-03 RX ADMIN — DOCUSATE SODIUM SCH MG: 100 CAPSULE ORAL at 22:06

## 2022-06-03 RX ADMIN — IPRATROPIUM BROMIDE AND ALBUTEROL SULFATE SCH ML: .5; 3 SOLUTION RESPIRATORY (INHALATION) at 02:41

## 2022-06-03 RX ADMIN — SODIUM CHLORIDE SCH MLS/HR: 900 INJECTION INTRAVENOUS at 00:36

## 2022-06-03 RX ADMIN — PROPOFOL SCH MLS/HR: 10 INJECTION, EMULSION INTRAVENOUS at 10:50

## 2022-06-03 RX ADMIN — IPRATROPIUM BROMIDE AND ALBUTEROL SULFATE SCH ML: .5; 3 SOLUTION RESPIRATORY (INHALATION) at 14:52

## 2022-06-03 RX ADMIN — DOCUSATE SODIUM SCH MG: 100 CAPSULE ORAL at 09:22

## 2022-06-03 RX ADMIN — METHYLPREDNISOLONE SODIUM SUCCINATE SCH MG: 40 INJECTION, POWDER, FOR SOLUTION INTRAMUSCULAR; INTRAVENOUS at 00:36

## 2022-06-03 RX ADMIN — ACETAMINOPHEN PRN MG: 325 TABLET ORAL at 11:27

## 2022-06-03 RX ADMIN — LORAZEPAM PRN MG: 2 INJECTION INTRAMUSCULAR; INTRAVENOUS at 14:50

## 2022-06-03 RX ADMIN — ENOXAPARIN SODIUM SCH MG: 100 INJECTION SUBCUTANEOUS at 22:06

## 2022-06-03 RX ADMIN — FAMOTIDINE SCH MG: 10 INJECTION INTRAVENOUS at 22:05

## 2022-06-03 RX ADMIN — METOPROLOL TARTRATE SCH MG: 25 TABLET, FILM COATED ORAL at 22:06

## 2022-06-03 RX ADMIN — DEXMEDETOMIDINE HYDROCHLORIDE SCH MLS/HR: 100 INJECTION, SOLUTION, CONCENTRATE INTRAVENOUS at 01:54

## 2022-06-03 RX ADMIN — POTASSIUM CHLORIDE SCH MEQ: 1500 TABLET, EXTENDED RELEASE ORAL at 06:00

## 2022-06-03 RX ADMIN — INSULIN ASPART SCH UNIT: 100 INJECTION, SOLUTION INTRAVENOUS; SUBCUTANEOUS at 06:10

## 2022-06-03 RX ADMIN — IPRATROPIUM BROMIDE AND ALBUTEROL SULFATE SCH ML: .5; 3 SOLUTION RESPIRATORY (INHALATION) at 07:15

## 2022-06-03 RX ADMIN — SODIUM CHLORIDE SCH MLS/HR: 900 INJECTION, SOLUTION INTRAVENOUS at 22:05

## 2022-06-03 RX ADMIN — METHYLPREDNISOLONE SODIUM SUCCINATE SCH MG: 40 INJECTION, POWDER, FOR SOLUTION INTRAMUSCULAR; INTRAVENOUS at 06:37

## 2022-06-03 NOTE — DIAGNOSTIC IMAGING REPORT
PROCEDURE: CT head without contrast.



TECHNIQUE: Multiple contiguous axial images were obtained through

the brain without the use of intravenous contrast. Auto Exposure

Controls were utilized during the CT exam to meet ALARA standards

for radiation dose reduction. 



INDICATION: Altered mental status.



FINDINGS: There is encephalomalacic change in the left cerebral

hemisphere consistent with old vascular injury. There are no

masses or hemorrhages. There are no extra-axial fluid

collections. There is no lacunar infarct in the left caudate

nucleus.



IMPRESSION: Old ischemic changes in left cerebral hemisphere. No

acute abnormality is seen. 



Dictated by: 



  Dictated on workstation # [id number]

## 2022-06-03 NOTE — DIAGNOSTIC IMAGING REPORT
INDICATION: Intubated, ICU care.



COMPARISON: 06/02/2022.



TECHNIQUE: Single radiograph of the chest dated 06/03/2022.



FINDINGS: Endotracheal tube and enteric catheter are again

identified. The cardiac silhouette is stable. Significant mixed

interstitial and airspace opacities are again identified, right

greater than left. These opacities have slightly improved since

the prior examination. Low lung volumes. No large-volume layering

pleural effusion. No pneumothorax. Osseous structures appear

stable.



IMPRESSION: Slightly improved though significant persisting right

greater than left pulmonary opacities with associated low lung

volumes.



Unchanged lines and tubes.



Dictated by: 



  Dictated on workstation # [id number]

## 2022-06-03 NOTE — DIAGNOSTIC IMAGING REPORT
INDICATION: Intubated, dyspnea



COMPARISON: 06/01/2022.



TECHNIQUE: Single radiograph of the chest dated 06/02/2022.



FINDINGS: Endotracheal tube and enteric catheter are again

identified. The cardiac silhouette is stable. Extensive mixed

interstitial and airspace opacities are again identified, right

greater than left. This has slightly improved within the left

lung though is relatively stable on the right. No large volume

pleural effusions. No pneumothorax. No acute osseous abnormality.



IMPRESSION: Persistent right greater than left pulmonary

opacities are again identified, minimally improved on the left.



Unchanged lines and tubes.



Dictated by: 



  Dictated on workstation # [id number]

## 2022-06-03 NOTE — PROGRESS NOTE - HOSPITALIST
Subjective


HPI/CC On Admission


Date Seen by Provider:  Ammon 3, 2022


Time Seen by Provider:  10:30


CC: SOB





HPI: This is a 62 yr old WM with a history of stroke with right sided 

hemiparesis. In addition to COPD. Pt continues to smoke. He presented to the ER 

in [place] with SOB. He was found to have exacerbation of COPD and acute on 

chronic respiratory failure. At this current time pt has remained stable and 

will be transferred to the 4th floor. IV steroids reviewed and pt will remain on

oxygen.


Subjective/Events-last exam


Pt is still intubated


Vent setting are 450/16/5/30


Updated family in depth about TRACH and PEG





Objective


Exam


Vital Signs





Vital Signs








  Date Time  Temp Pulse Resp B/P (MAP) Pulse Ox O2 Delivery O2 Flow Rate FiO2


 


6/3/22 19:56 36.5       


 


6/3/22 19:39        30


 


6/3/22 19:27  97 20  97   


 


6/3/22 18:00    134/66 (88)  Mechanical Ventilator 30.00 





Capillary Refill : Less Than 3 Seconds


General Appearance:  Chronically ill, Thin, Other (sedated)


Respiratory:  No Accessory Muscle Use, No Respiratory Distress, Decreased Breath

Sounds


Cardiovascular:  Regular Rate, Rhythm





Results/Procedures


Lab


Laboratory Tests


6/3/22 05:10








6/3/22 06:30








Patient resulted labs reviewed.





Assessment/Plan


Assessment and Plan


Assess & Plan/Chief Complaint


Assessment:


Acute on chronic respiratory failure


Exacerbation of COPD


Alcohol withdrawal?


NSTEMI


Congestive heart failure?


Smoker


Marijuana use


Poor reserve


History of CVA with right-sided weakness





Plan:


BiPAP as needed


O2 supplement


Antibiotics


Steroids


Cardiology





6/1/2022:


Appreciate vent management


Supportive care


May need [place] considering he has end stage COPD





6/2/22:


Supportive care 


Ventilator management appreciated





6/3/22:


Updated family on trach and PEG in order to get approval for [place]





Diagnosis/Problems


Diagnosis/Problems





(1) Acute exacerbation of chronic obstructive pulmonary disease (COPD)


Status:  Acute


(2) History of cerebrovascular accident


(3) Nonrheumatic aortic valve stenosis with regurgitation


(4) Primary hypertension


(5) Mixed hyperlipidemia











CANDIS WONG DO                 Ammon 3, 2022 06:17

## 2022-06-03 NOTE — CARDIOLOGY PROGRESS NOTE
Progress Note-Cardiology


Events since last exam


Date Seen by Provider:  Ammon 3, 2022


Time Seen by Provider:  08:32


Events since last exam


I am following him due to heart failure and NSTEMI.  He remains in the intensive

care unit intubated and sedated.  The nurses had to increase his propofol due to

some agitation when they turned him.  I cannot obtain any history from the 

patient due to his sedation and intubated status.





Certain portions of this document may have been dictated utilizing voice 

recognition technology.  Inherent to this technology, typographical and 

grammatical errors may exist.  As much as I am diligent to identify and correct 

these mistakes, some errors may remain in the document.





Vitals


Last set of Vitals Signs





Vital Signs








 6/3/22 6/3/22 6/3/22





 10:51 11:00 11:27


 


Temp   38.3


 


Pulse  101 


 


Resp  22 


 


B/P (MAP)  124/68 (86) 


 


Pulse Ox  100 


 


O2 Delivery  Mechanical Ventilator 


 


O2 Flow Rate  30.00 


 


FiO2 30  











Labs


Labs


Laboratory Tests


6/3/22 05:10








6/3/22 06:30














Exam


Vital Signs





Vital Signs








  Date Time  Temp Pulse Resp B/P (MAP) Pulse Ox O2 Delivery O2 Flow Rate FiO2


 


6/3/22 11:27 38.3       


 


6/3/22 11:00  101 22 124/68 (86) 100 Mechanical Ventilator 30.00 


 


6/3/22 10:51        30








Physical Exam


General: Intubated and sedated.  Well nourished and appears stated age.


Eye:  Conjunctivae are clear. There are no xanthelasma.


HENT: Normocephalic. Atraumatic. Carotid pulsations 2/2 without bruits.


Neck: Jugular venous pressure does not appear elevated. No thyromegaly 

appreciated.


Respiratory: Symmetrical expansion bilaterally.  Coarse breath sounds due to the

ventilator.


Cardiovascular: Normal rate. Regular rhythm. No murmur. No gallop. Point of 

maximal impulse is not appear displaced. Good pulses equal in all extremities. 

No edema.


Gastrointestinal: Soft. Normal bowel sounds.


Skin: Skin turgor is normal. There is no pallor.


Musculoskeletal: No obvious deformities.


Neurologic: Intubated and sedated. 


Psychiatric: Not obtainable due to clinical status.


Labs





Laboratory Tests








Test


 6/2/22


12:13 6/2/22


15:12 6/2/22


18:01 6/3/22


00:43 Range/Units


 


 


Glucometer 171 H 202 H 223 H 173 H   MG/DL


 


Test


 6/3/22


05:10 6/3/22


05:55 6/3/22


06:30 6/3/22


11:21 Range/Units


 


 


White Blood Count


 9.3 


 


 


 


 4.3-11.0


10^3/uL


 


Red Blood Count


 3.18 L


 


 


 


 4.30-5.52


10^6/uL


 


Hemoglobin 12.1 L    13.3-17.7  g/dL


 


Hematocrit 32 L    40-54  %


 


Mean Corpuscular Volume 99     80-99  fL


 


Mean Corpuscular Hemoglobin 38 H    25-34  pg


 


Mean Corpuscular Hemoglobin


Concent 38 H


 


 


 


 32-36  g/dL





 


Red Cell Distribution Width 14.0     10.0-14.5  %


 


Platelet Count


 132 


 


 


 


 130-400


10^3/uL


 


Mean Platelet Volume 12.4 H    9.0-12.2  fL


 


Immature Granulocyte % (Auto) 0      %


 


Neutrophils (%) (Auto) 90 H    42-75  %


 


Lymphocytes (%) (Auto) 5 L    12-44  %


 


Monocytes (%) (Auto) 4     0-12  %


 


Eosinophils (%) (Auto) 0     0-10  %


 


Basophils (%) (Auto) 0     0-10  %


 


Neutrophils # (Auto)


 8.4 H


 


 


 


 1.8-7.8


10^3/uL


 


Lymphocytes # (Auto)


 0.5 L


 


 


 


 1.0-4.0


10^3/uL


 


Monocytes # (Auto)


 0.3 


 


 


 


 0.0-1.0


10^3/uL


 


Eosinophils # (Auto)


 0.0 


 


 


 


 0.0-0.3


10^3/uL


 


Basophils # (Auto)


 0.0 


 


 


 


 0.0-0.1


10^3/uL


 


Immature Granulocyte # (Auto)


 0.0 


 


 


 


 0.0-0.1


10^3/uL


 


Blood Gas Puncture Site  LEFT RADIAL     


 


Blood Gas Patient Temperature  37.3     


 


Arterial Blood pH  7.45 H   7.37-7.43  


 


Arterial Blood Partial


Pressure CO2 


 29 L


 


 


 35-45  MMHG





 


Arterial Blood Partial


Pressure O2 


 72 L


 


 


 79-93  MMHG





 


Arterial Blood HCO3  20 L   23-27  MMOL/L


 


Arterial Blood Total CO2


 


 20.8 L


 


 


 21.0-31.0


MMOL/L


 


Arterial Blood Oxygen


Saturation 


 96 


 


 


   %





 


Arterial Blood Base Excess


 


 -3.4 L


 


 


 -2.5-2.5


MMOL/L


 


Hansel Test  POSITIVE     


 


Blood Gas Ventilator Setting  YES     


 


Blood Gas Inspired Oxygen  30%     


 


Sodium Level   134 L  135-145  MMOL/L


 


Potassium Level   4.0   3.6-5.0  MMOL/L


 


Chloride Level   102     MMOL/L


 


Carbon Dioxide Level   21   21-32  MMOL/L


 


Anion Gap   11   5-14  MMOL/L


 


Blood Urea Nitrogen   15   7-18  MG/DL


 


Creatinine


 


 


 0.83 


 


 0.60-1.30


MG/DL


 


Estimat Glomerular Filtration


Rate 


 


 100 


 


  





 


BUN/Creatinine Ratio   18    


 


Glucose Level   190 H    MG/DL


 


Calcium Level   7.7 L  8.5-10.1  MG/DL


 


Corrected Calcium   8.4 L  8.5-10.1  MG/DL


 


Magnesium Level   2.5 H  1.6-2.4  MG/DL


 


Total Bilirubin   0.8   0.1-1.0  MG/DL


 


Aspartate Amino Transf


(AST/SGOT) 


 


 22 


 


 5-34  U/L





 


Alanine Aminotransferase


(ALT/SGPT) 


 


 26 


 


 0-55  U/L





 


Alkaline Phosphatase   37 L    U/L


 


Total Protein   5.7 L  6.4-8.2  GM/DL


 


Albumin   3.1 L  3.2-4.5  GM/DL


 


Triglycerides Level   109   <150  MG/DL


 


Glucometer    174 H   MG/DL








Radiology


Electrocardiogram continues to show sinus tachycardia with diffuse ST-T wave 

changes, concerning for global ischemia or possibly an intracerebral event.





Diagnosis/Problems


Diagnosis/Problems





(1) Non-ST elevation myocardial infarction (NSTEMI), initial care episode


Assessment & Plan:  His troponin levels were elevated although flat.  The 

troponin elevation raises a concern about a non-ST elevation myocardial 

infarction although the patient was not complaining of chest pain at the time of

admission.  Now his electrocardiogram shows diffuse ST-T wave changes which 

could be consistent with coronary ischemia although sometimes we see this 

abnormality with significant intracerebral processes.  The troponin elevation 

could be a type II non-ST elevation myocardial infarction due to his acute 

respiratory failure.  However, I cannot completely exclude a type I non-ST 

elevation myocardial infarction.  I recommend he continue on aspirin which he 

was taking at home.  I ordered statin medication which he was also taking at 

home.  Since his pulmonary status now seems to be improving, I ordered low-dose 

beta-blocker.  However, if this causes bronchospasm or increased oxygen 

requirements, we will need to stop this.  He is also receiving therapeutic 

dosing of enoxaparin.  This can be changed over to prophylactic dosing of 

enoxaparin on 6/4.  This would mean he will have received 5 days of therapeutic 

dosing of enoxaparin.  Assuming he recovers from the acute respiratory illness, 

he will ultimately need to be considered for an ischemic evaluation.  However, 

in his present state, he is not an ideal candidate for noninvasive or an 

invasive coronary ischemic evaluation.





(2) Acute heart failure with preserved ejection fraction (HFpEF)


Assessment & Plan:  He does have an elevated BNP level and he was short of 

breath. However, this is in the setting of an acute exacerbation of his chronic 

obstructive pulmonary disease with acute respiratory failure.  Some of the BNP 

elevation may also be due to aortic stenosis.  He underwent an echocardiogram 

during this admission that shows a normal ejection fraction.   He received a 

dose of IV Lasix at the outside emergency room.  I gave him another dose of 

intravenous furosemide on 5/31.  He still has some persistent infiltrates on his

chest x-ray from 6/3 which could be atypical pulmonary edema.  I will give him 

another dose of intravenous furosemide today.   





(3) Nonrheumatic aortic valve stenosis with regurgitation


Assessment & Plan:  His echocardiogram is consistent with significant aortic 

stenosis as well as regurgitation.  Assuming he recovers from the pulmonary 

condition, we may need to consider whether or not to evaluate him for any sort 

of aortic valve intervention.  This could most likely be done electively.





(4) Primary hypertension


Assessment & Plan:  Continue lisinopril and metoprolol.





(5) Mixed hyperlipidemia


Assessment & Plan:  I have ordered statin medication which he appears to be 

taking at home





(6) Tonic clonic convulsion


Assessment & Plan:  He does not have a history of seizures.  He did have a b

aseline tremor prior to being intubated.  This could be a sign of an underlying 

acute neurologic event.  He underwent head CT on 6/2 that showed his old stroke 

but no new findings.





(7) Acute respiratory failure with hypoxia


Status:  Acute


Assessment & Plan:  Most likely due to chronic obstructive pulmonary disease 

with perhaps some degree of heart failure as noted above.  The hospitalist and 

eICU are managing the ventilator





(8) History of cerebrovascular accident


Assessment & Plan:  He has a remote history of a stroke.  He should continue 

aspirin and statin medication.  He has not had any recent neurologic complaints.





(9) Cigarette smoker


Assessment & Plan:  He needs to quit smoking.  He was previously counseled in 

this regard.














RANJANA RIVERA JR, MD Jun 3, 2022 08:34

## 2022-06-03 NOTE — OCC THERAPY PROGRESS NOTE
Therapy Progress Note


Pt is currently intubated and OT will continue to monitor pt's status.  Will 

initiate treatment when pt is medically stable and able to actively participate 

in skilled therapy.











FRANSISCO MURRAY                Ammon 3, 2022 06:35

## 2022-06-04 VITALS — DIASTOLIC BLOOD PRESSURE: 72 MMHG | SYSTOLIC BLOOD PRESSURE: 120 MMHG

## 2022-06-04 VITALS — DIASTOLIC BLOOD PRESSURE: 73 MMHG | SYSTOLIC BLOOD PRESSURE: 120 MMHG

## 2022-06-04 VITALS — DIASTOLIC BLOOD PRESSURE: 72 MMHG | SYSTOLIC BLOOD PRESSURE: 127 MMHG

## 2022-06-04 VITALS — SYSTOLIC BLOOD PRESSURE: 114 MMHG | DIASTOLIC BLOOD PRESSURE: 26 MMHG

## 2022-06-04 VITALS — DIASTOLIC BLOOD PRESSURE: 69 MMHG | SYSTOLIC BLOOD PRESSURE: 131 MMHG

## 2022-06-04 VITALS — SYSTOLIC BLOOD PRESSURE: 130 MMHG | DIASTOLIC BLOOD PRESSURE: 71 MMHG

## 2022-06-04 VITALS — DIASTOLIC BLOOD PRESSURE: 67 MMHG | SYSTOLIC BLOOD PRESSURE: 127 MMHG

## 2022-06-04 VITALS — DIASTOLIC BLOOD PRESSURE: 63 MMHG | SYSTOLIC BLOOD PRESSURE: 120 MMHG

## 2022-06-04 VITALS — DIASTOLIC BLOOD PRESSURE: 77 MMHG | SYSTOLIC BLOOD PRESSURE: 137 MMHG

## 2022-06-04 VITALS — SYSTOLIC BLOOD PRESSURE: 122 MMHG | DIASTOLIC BLOOD PRESSURE: 72 MMHG

## 2022-06-04 VITALS — DIASTOLIC BLOOD PRESSURE: 68 MMHG | SYSTOLIC BLOOD PRESSURE: 118 MMHG

## 2022-06-04 VITALS — SYSTOLIC BLOOD PRESSURE: 125 MMHG | DIASTOLIC BLOOD PRESSURE: 67 MMHG

## 2022-06-04 VITALS — SYSTOLIC BLOOD PRESSURE: 127 MMHG | DIASTOLIC BLOOD PRESSURE: 70 MMHG

## 2022-06-04 VITALS — SYSTOLIC BLOOD PRESSURE: 129 MMHG | DIASTOLIC BLOOD PRESSURE: 70 MMHG

## 2022-06-04 VITALS — SYSTOLIC BLOOD PRESSURE: 129 MMHG | DIASTOLIC BLOOD PRESSURE: 76 MMHG

## 2022-06-04 VITALS — SYSTOLIC BLOOD PRESSURE: 116 MMHG | DIASTOLIC BLOOD PRESSURE: 59 MMHG

## 2022-06-04 VITALS — DIASTOLIC BLOOD PRESSURE: 69 MMHG | SYSTOLIC BLOOD PRESSURE: 113 MMHG

## 2022-06-04 VITALS — SYSTOLIC BLOOD PRESSURE: 141 MMHG | DIASTOLIC BLOOD PRESSURE: 79 MMHG

## 2022-06-04 VITALS — SYSTOLIC BLOOD PRESSURE: 128 MMHG | DIASTOLIC BLOOD PRESSURE: 74 MMHG

## 2022-06-04 VITALS — DIASTOLIC BLOOD PRESSURE: 61 MMHG | SYSTOLIC BLOOD PRESSURE: 120 MMHG

## 2022-06-04 VITALS — SYSTOLIC BLOOD PRESSURE: 133 MMHG | DIASTOLIC BLOOD PRESSURE: 73 MMHG

## 2022-06-04 VITALS — SYSTOLIC BLOOD PRESSURE: 127 MMHG | DIASTOLIC BLOOD PRESSURE: 72 MMHG

## 2022-06-04 VITALS — DIASTOLIC BLOOD PRESSURE: 73 MMHG | SYSTOLIC BLOOD PRESSURE: 124 MMHG

## 2022-06-04 LAB
ALBUMIN SERPL-MCNC: 3.1 GM/DL (ref 3.2–4.5)
ALP SERPL-CCNC: 33 U/L (ref 40–136)
ALT SERPL-CCNC: 35 U/L (ref 0–55)
ARTERIAL PATENCY WRIST A: (no result)
ARTERIAL PATENCY WRIST A: POSITIVE
BASE EXCESS STD BLDA CALC-SCNC: 1.3 MMOL/L (ref -2.5–2.5)
BASE EXCESS STD BLDA CALC-SCNC: 2.3 MMOL/L (ref -2.5–2.5)
BASOPHILS # BLD AUTO: 0 10^3/UL (ref 0–0.1)
BASOPHILS NFR BLD AUTO: 0 % (ref 0–10)
BDY SITE: (no result)
BDY SITE: (no result)
BILIRUB SERPL-MCNC: 0.9 MG/DL (ref 0.1–1)
BODY TEMPERATURE: 36
BODY TEMPERATURE: 36.3
BUN/CREAT SERPL: 26
CALCIUM SERPL-MCNC: 7.3 MG/DL (ref 8.5–10.1)
CHLORIDE SERPL-SCNC: 103 MMOL/L (ref 98–107)
CO2 BLDA CALC-SCNC: 25.9 MMOL/L (ref 21–31)
CO2 BLDA CALC-SCNC: 27.1 MMOL/L (ref 21–31)
CO2 SERPL-SCNC: 21 MMOL/L (ref 21–32)
CREAT SERPL-MCNC: 0.69 MG/DL (ref 0.6–1.3)
EOSINOPHIL # BLD AUTO: 0 10^3/UL (ref 0–0.3)
EOSINOPHIL NFR BLD AUTO: 0 % (ref 0–10)
GFR SERPLBLD BASED ON 1.73 SQ M-ARVRAT: 105 ML/MIN
GLUCOSE SERPL-MCNC: 170 MG/DL (ref 70–105)
HCT VFR BLD CALC: 42 % (ref 40–54)
HGB BLD-MCNC: 14.3 G/DL (ref 13.3–17.7)
INHALED O2 FLOW RATE: (no result) L/MIN
INHALED O2 FLOW RATE: (no result) L/MIN
LYMPHOCYTES # BLD AUTO: 0.5 10^3/UL (ref 1–4)
LYMPHOCYTES NFR BLD AUTO: 5 % (ref 12–44)
MAGNESIUM SERPL-MCNC: 2.2 MG/DL (ref 1.6–2.4)
MANUAL DIFFERENTIAL PERFORMED BLD QL: NO
MCH RBC QN AUTO: 33 PG (ref 25–34)
MCHC RBC AUTO-ENTMCNC: 34 G/DL (ref 32–36)
MCV RBC AUTO: 96 FL (ref 80–99)
MONOCYTES # BLD AUTO: 0.7 10^3/UL (ref 0–1)
MONOCYTES NFR BLD AUTO: 7 % (ref 0–12)
NEUTROPHILS # BLD AUTO: 8.5 10^3/UL (ref 1.8–7.8)
NEUTROPHILS NFR BLD AUTO: 87 % (ref 42–75)
PCO2 BLDA: 35 MMHG (ref 35–45)
PCO2 BLDA: 36 MMHG (ref 35–45)
PH BLDA: 7.47 [PH] (ref 7.37–7.43)
PH BLDA: 7.47 [PH] (ref 7.37–7.43)
PLATELET # BLD: 128 10^3/UL (ref 130–400)
PMV BLD AUTO: 10.7 FL (ref 9–12.2)
PO2 BLDA: 69 MMHG (ref 79–93)
POTASSIUM SERPL-SCNC: 3.1 MMOL/L (ref 3.6–5)
PROT SERPL-MCNC: 5.6 GM/DL (ref 6.4–8.2)
SAO2 % BLDA FROM PO2: 71 % (ref 94–100)
SAO2 % BLDA FROM PO2: 96 % (ref 94–100)
SODIUM SERPL-SCNC: 137 MMOL/L (ref 135–145)
VENTILATION MODE VENT: YES
VENTILATION MODE VENT: YES
WBC # BLD AUTO: 9.8 10^3/UL (ref 4.3–11)

## 2022-06-04 RX ADMIN — Medication SCH MG: at 05:47

## 2022-06-04 RX ADMIN — POTASSIUM CHLORIDE SCH MLS/HR: 200 INJECTION, SOLUTION INTRAVENOUS at 04:44

## 2022-06-04 RX ADMIN — IPRATROPIUM BROMIDE AND ALBUTEROL SULFATE SCH ML: .5; 3 SOLUTION RESPIRATORY (INHALATION) at 03:03

## 2022-06-04 RX ADMIN — SODIUM CHLORIDE SCH MLS/HR: 900 INJECTION, SOLUTION INTRAVENOUS at 20:14

## 2022-06-04 RX ADMIN — PROPOFOL SCH MLS/HR: 10 INJECTION, EMULSION INTRAVENOUS at 01:10

## 2022-06-04 RX ADMIN — METOPROLOL TARTRATE SCH MG: 25 TABLET, FILM COATED ORAL at 09:28

## 2022-06-04 RX ADMIN — DEXMEDETOMIDINE HYDROCHLORIDE SCH MLS/HR: 100 INJECTION, SOLUTION, CONCENTRATE INTRAVENOUS at 03:23

## 2022-06-04 RX ADMIN — POTASSIUM CHLORIDE SCH MLS/HR: 200 INJECTION, SOLUTION INTRAVENOUS at 07:46

## 2022-06-04 RX ADMIN — INSULIN ASPART SCH UNIT: 100 INJECTION, SOLUTION INTRAVENOUS; SUBCUTANEOUS at 06:50

## 2022-06-04 RX ADMIN — INSULIN ASPART SCH UNIT: 100 INJECTION, SOLUTION INTRAVENOUS; SUBCUTANEOUS at 12:00

## 2022-06-04 RX ADMIN — POTASSIUM CHLORIDE SCH MLS/HR: 200 INJECTION, SOLUTION INTRAVENOUS at 05:46

## 2022-06-04 RX ADMIN — IPRATROPIUM BROMIDE AND ALBUTEROL SULFATE SCH ML: .5; 3 SOLUTION RESPIRATORY (INHALATION) at 19:36

## 2022-06-04 RX ADMIN — SODIUM CHLORIDE SCH MLS/HR: 900 INJECTION, SOLUTION INTRAVENOUS at 05:46

## 2022-06-04 RX ADMIN — LISINOPRIL SCH MG: 10 TABLET ORAL at 09:28

## 2022-06-04 RX ADMIN — SODIUM CHLORIDE SCH MLS/HR: 900 INJECTION, SOLUTION INTRAVENOUS at 14:45

## 2022-06-04 RX ADMIN — FAMOTIDINE SCH MG: 10 INJECTION INTRAVENOUS at 20:15

## 2022-06-04 RX ADMIN — IPRATROPIUM BROMIDE AND ALBUTEROL SULFATE SCH ML: .5; 3 SOLUTION RESPIRATORY (INHALATION) at 15:03

## 2022-06-04 RX ADMIN — MAGNESIUM SULFATE IN DEXTROSE SCH MLS/HR: 10 INJECTION, SOLUTION INTRAVENOUS at 04:44

## 2022-06-04 RX ADMIN — METOPROLOL TARTRATE SCH MG: 25 TABLET, FILM COATED ORAL at 20:15

## 2022-06-04 RX ADMIN — ENOXAPARIN SODIUM SCH MG: 100 INJECTION SUBCUTANEOUS at 20:16

## 2022-06-04 RX ADMIN — INSULIN ASPART SCH UNIT: 100 INJECTION, SOLUTION INTRAVENOUS; SUBCUTANEOUS at 18:00

## 2022-06-04 RX ADMIN — PROPOFOL SCH MLS/HR: 10 INJECTION, EMULSION INTRAVENOUS at 14:25

## 2022-06-04 RX ADMIN — SODIUM CHLORIDE SCH MLS/HR: 900 INJECTION, SOLUTION INTRAVENOUS at 09:46

## 2022-06-04 RX ADMIN — SODIUM CHLORIDE SCH MLS/HR: 900 INJECTION INTRAVENOUS at 16:16

## 2022-06-04 RX ADMIN — SODIUM CHLORIDE SCH MLS/HR: 900 INJECTION INTRAVENOUS at 00:13

## 2022-06-04 RX ADMIN — ENOXAPARIN SODIUM SCH MG: 100 INJECTION SUBCUTANEOUS at 09:29

## 2022-06-04 RX ADMIN — LORAZEPAM PRN MG: 2 INJECTION INTRAMUSCULAR; INTRAVENOUS at 18:27

## 2022-06-04 RX ADMIN — METHYLPREDNISOLONE SODIUM SUCCINATE SCH MG: 125 INJECTION, POWDER, FOR SOLUTION INTRAMUSCULAR; INTRAVENOUS at 05:46

## 2022-06-04 RX ADMIN — POTASSIUM CHLORIDE SCH MLS/HR: 200 INJECTION, SOLUTION INTRAVENOUS at 06:51

## 2022-06-04 RX ADMIN — IPRATROPIUM BROMIDE AND ALBUTEROL SULFATE SCH ML: .5; 3 SOLUTION RESPIRATORY (INHALATION) at 10:41

## 2022-06-04 RX ADMIN — DEXMEDETOMIDINE HYDROCHLORIDE SCH MLS/HR: 100 INJECTION, SOLUTION, CONCENTRATE INTRAVENOUS at 14:25

## 2022-06-04 RX ADMIN — METHYLPREDNISOLONE SODIUM SUCCINATE SCH MG: 125 INJECTION, POWDER, FOR SOLUTION INTRAMUSCULAR; INTRAVENOUS at 20:15

## 2022-06-04 RX ADMIN — POTASSIUM CHLORIDE SCH MLS/HR: 200 INJECTION, SOLUTION INTRAVENOUS at 06:39

## 2022-06-04 RX ADMIN — ASPIRIN 81 MG CHEWABLE TABLET SCH MG: 81 TABLET CHEWABLE at 09:28

## 2022-06-04 RX ADMIN — FAMOTIDINE SCH MG: 10 INJECTION INTRAVENOUS at 09:28

## 2022-06-04 RX ADMIN — IPRATROPIUM BROMIDE AND ALBUTEROL SULFATE SCH ML: .5; 3 SOLUTION RESPIRATORY (INHALATION) at 07:17

## 2022-06-04 RX ADMIN — POTASSIUM CHLORIDE SCH MEQ: 1500 TABLET, EXTENDED RELEASE ORAL at 04:45

## 2022-06-04 RX ADMIN — SODIUM CHLORIDE SCH MLS/HR: 900 INJECTION INTRAVENOUS at 07:53

## 2022-06-04 RX ADMIN — INSULIN ASPART SCH UNIT: 100 INJECTION, SOLUTION INTRAVENOUS; SUBCUTANEOUS at 00:15

## 2022-06-04 RX ADMIN — DOCUSATE SODIUM SCH MG: 100 CAPSULE ORAL at 20:15

## 2022-06-04 RX ADMIN — DOCUSATE SODIUM SCH MG: 100 CAPSULE ORAL at 09:03

## 2022-06-04 NOTE — DIAGNOSTIC IMAGING REPORT
EXAMINATION: Chest 1 view



HISTORY: Respiratory failure



COMPARISON: 06/03/2022



FINDINGS: 



Endotracheal tube tip terminates 5 cm above the alia. Gastric

tube tip is in the body the stomach. Bilateral airspace opacities

have improved compared to prior exam. No pneumothorax. No pleural

effusion. Heart size is normal.



IMPRESSION: 



1. Improved now mild bilateral airspace opacities.



Dictated by: 



  Dictated on workstation # [id number]

## 2022-06-04 NOTE — CARDIOLOGY PROGRESS NOTE
Subjective


Date Seen by Provider:  Jun 4, 2022


Time Seen by Provider:  10:24


Subjective/Events-last exam


Patient is intubated and sedated.


Unable to provide any history


Review of Systems


General:  Other (Unable to provide review of system)





Objective-Cardiology


Exam


Last Set of Vital Signs





Vital Signs








 6/4/22 6/4/22 6/4/22





 00:39 08:00 10:00


 


Temp 35.9  


 


Pulse   92


 


Resp   22


 


B/P (MAP)  127/67 (87) 


 


Pulse Ox   100


 


O2 Delivery   Mechanical Ventilator


 


O2 Flow Rate   30.00


 


FiO2  30 








I&O











Intake and Output 


 


 6/4/22





 00:00


 


Intake Total 1090 ml


 


Output Total 3950 ml


 


Balance -2860 ml


 


 


 


IV Total 450 ml


 


Other 640 ml


 


Output Urine Total 3950 ml








General:  Other (Sedated and intubated)


HEENT:  Atraumatic


Neck:  Supple


Lungs:  Normal Air Movement, Other (Bilateral rhonchi)


Heart:  Regular Rate, Normal S1, Normal S2


Abdomen:  Normal Bowel Sounds


Extremities:  No Clubbing


Skin:  No Rashes, No Breakdown


Neuro:  Other (Sedated and intubated)


Psych/Mental Status:  Other (Sedated and intubated)





Results


Lab


Laboratory Tests


6/4/22 03:51














A/P-Cardiology


Admission Diagnosis


Acute respiratory failure


Acute exacerbation of COPD


Non-ST elevation myocardial infarction, type II MI


Aortic valve stenosis





Assessment/Plan


Acute respiratory failure, ventilator dependent.


Acute exacerbation of COPD with pneumonia.


Receiving antibiotics.  Managed by medical team.





Coronary artery disease, non-ST elevation myocardial infarction, probably type 

II myocardial infarction secondary to severe hypoxemia and respiratory failure.


Underlying coronary artery disease cannot be entirely excluded.  We will 

continue monitoring and proceed with conservative management for now.





Congestive heart failure, acute left ventricular diastolic dysfunction, 

preserved systolic function.  Continue to monitor





Aortic valve stenosis, aortic regurgitation.


2D echo was reported by Dr. Pereira as ejection fraction 55%, grade 2 diastolic 

dysfunction, moderate aortic valve stenosis with peak gradient 58 mmHg, mean 

gradient 30 mmHg, valve area 1.1 cm, moderate aortic regurgitation, moderate 

tricuspid regurgitation, estimated pulmonary artery pressure around 40 mmHg.





Hypertension, continue to monitor blood pressure





Hyperlipidemia, monitor lipids.





History of CVA with right hemiparesis.  Has been maintained on aspirin as an 

outpatient





Tobaccoism, still an active smoker.











SARAH BURDICK MD               Jun 4, 2022 10:29

## 2022-06-04 NOTE — PROGRESS NOTE - HOSPITALIST
Subjective


HPI/CC On Admission


Date Seen by Provider:  Jun 4, 2022


Time Seen by Provider:  07:00


CC: SOB





HPI: This is a 62 yr old WM with a history of stroke with right sided 

hemiparesis. In addition to COPD. Pt continues to smoke. He presented to the ER 

in [facility] with SOB. He was found to have exacerbation of COPD and acute on 

chronic respiratory failure. At this current time pt has remained stable and 

will be transferred to the 4th floor. IV steroids reviewed and pt will remain on

oxygen.


Subjective/Events-last exam


Patient sedated on vent attempts at decreasing propofol led to agitation and 

bucking per nurse yesterday.  Patient appears to be in no acute distress 

currently not assisting the vent but appearing chronically ill with diffuse 

sarcopenia.





Objective


Exam


Vital Signs





Vital Signs








  Date Time  Temp Pulse Resp B/P (MAP) Pulse Ox O2 Delivery O2 Flow Rate FiO2


 


6/4/22 07:40      Mechanical Ventilator 30.00 


 


6/4/22 07:39        30


 


6/4/22 07:17  89 21  100   


 


6/4/22 07:00    127/72 (90)    


 


6/4/22 00:39 35.9       





Capillary Refill : Less Than 3 Seconds


General Appearance:  No Apparent Distress


Respiratory:  Other (Coarse bilateral breath sounds without wheezing currently 

ventilating easily)


Cardiovascular:  Regular Rate, Rhythm, No Gallop, Other ( 2/6 systolic ejection 

murmur)


Gastrointestinal:  Other ( bowel sounds hypoactive but no distention no reaction

to palpation abdomen soft no bruits noted no organomegaly appreciated)


Extremity:  Other ( trace edema of upper and lower extremities without cyanosis 

or clubbing)





Results/Procedures


Lab


Laboratory Tests


6/4/22 03:51








Patient resulted labs reviewed.





Assessment/Plan


Assessment and Plan


Assess & Plan/Chief Complaint


(1) Acute exacerbation of chronic obstructive pulmonary disease (COPD)


Status:  AcuteWith patchy infiltrates suspicious for pneumonia continue current 

broad-spectrum antibiotics since cultures are negative of blood in sputum thus 

far.  As there is minimal wheezing will decrease Solu-Medrol.  Patient known 

alcoholic severe sarcopenia and evidence for malnutrition as this is the first 

time that I have seen this patient I am going to defer today to eICU in regards 

to sedation and steps  toward   Ventilator weaning.  There is also the potential

for alcohol withdrawal complicating management overall very poor prognosis.


(2) History of cerebrovascular accident


(3) Nonrheumatic aortic valve stenosis with regurgitation


(4) Primary hypertension


(5) Mixed hyperlipidemia





Critical Care


Ventilator Management











MICHAEL QUESADA MD               Jun 4, 2022 08:36

## 2022-06-05 VITALS — DIASTOLIC BLOOD PRESSURE: 68 MMHG | SYSTOLIC BLOOD PRESSURE: 130 MMHG

## 2022-06-05 VITALS — DIASTOLIC BLOOD PRESSURE: 76 MMHG | SYSTOLIC BLOOD PRESSURE: 129 MMHG

## 2022-06-05 VITALS — DIASTOLIC BLOOD PRESSURE: 74 MMHG | SYSTOLIC BLOOD PRESSURE: 137 MMHG

## 2022-06-05 VITALS — SYSTOLIC BLOOD PRESSURE: 124 MMHG | DIASTOLIC BLOOD PRESSURE: 61 MMHG

## 2022-06-05 VITALS — SYSTOLIC BLOOD PRESSURE: 126 MMHG | DIASTOLIC BLOOD PRESSURE: 73 MMHG

## 2022-06-05 VITALS — SYSTOLIC BLOOD PRESSURE: 132 MMHG | DIASTOLIC BLOOD PRESSURE: 77 MMHG

## 2022-06-05 VITALS — DIASTOLIC BLOOD PRESSURE: 83 MMHG | SYSTOLIC BLOOD PRESSURE: 133 MMHG

## 2022-06-05 VITALS — SYSTOLIC BLOOD PRESSURE: 116 MMHG | DIASTOLIC BLOOD PRESSURE: 71 MMHG

## 2022-06-05 VITALS — SYSTOLIC BLOOD PRESSURE: 128 MMHG | DIASTOLIC BLOOD PRESSURE: 72 MMHG

## 2022-06-05 VITALS — SYSTOLIC BLOOD PRESSURE: 127 MMHG | DIASTOLIC BLOOD PRESSURE: 66 MMHG

## 2022-06-05 VITALS — DIASTOLIC BLOOD PRESSURE: 81 MMHG | SYSTOLIC BLOOD PRESSURE: 137 MMHG

## 2022-06-05 VITALS — DIASTOLIC BLOOD PRESSURE: 67 MMHG | SYSTOLIC BLOOD PRESSURE: 123 MMHG

## 2022-06-05 VITALS — SYSTOLIC BLOOD PRESSURE: 123 MMHG | DIASTOLIC BLOOD PRESSURE: 72 MMHG

## 2022-06-05 VITALS — DIASTOLIC BLOOD PRESSURE: 70 MMHG | SYSTOLIC BLOOD PRESSURE: 127 MMHG

## 2022-06-05 VITALS — DIASTOLIC BLOOD PRESSURE: 94 MMHG | SYSTOLIC BLOOD PRESSURE: 130 MMHG

## 2022-06-05 VITALS — SYSTOLIC BLOOD PRESSURE: 117 MMHG | DIASTOLIC BLOOD PRESSURE: 65 MMHG

## 2022-06-05 VITALS — DIASTOLIC BLOOD PRESSURE: 68 MMHG | SYSTOLIC BLOOD PRESSURE: 129 MMHG

## 2022-06-05 VITALS — DIASTOLIC BLOOD PRESSURE: 71 MMHG | SYSTOLIC BLOOD PRESSURE: 132 MMHG

## 2022-06-05 VITALS — DIASTOLIC BLOOD PRESSURE: 76 MMHG | SYSTOLIC BLOOD PRESSURE: 127 MMHG

## 2022-06-05 VITALS — SYSTOLIC BLOOD PRESSURE: 139 MMHG | DIASTOLIC BLOOD PRESSURE: 72 MMHG

## 2022-06-05 VITALS — SYSTOLIC BLOOD PRESSURE: 129 MMHG | DIASTOLIC BLOOD PRESSURE: 72 MMHG

## 2022-06-05 VITALS — DIASTOLIC BLOOD PRESSURE: 66 MMHG | SYSTOLIC BLOOD PRESSURE: 121 MMHG

## 2022-06-05 VITALS — SYSTOLIC BLOOD PRESSURE: 129 MMHG | DIASTOLIC BLOOD PRESSURE: 74 MMHG

## 2022-06-05 VITALS — SYSTOLIC BLOOD PRESSURE: 117 MMHG | DIASTOLIC BLOOD PRESSURE: 70 MMHG

## 2022-06-05 VITALS — SYSTOLIC BLOOD PRESSURE: 122 MMHG | DIASTOLIC BLOOD PRESSURE: 71 MMHG

## 2022-06-05 VITALS — DIASTOLIC BLOOD PRESSURE: 56 MMHG | SYSTOLIC BLOOD PRESSURE: 102 MMHG

## 2022-06-05 LAB
ALBUMIN SERPL-MCNC: 3 GM/DL (ref 3.2–4.5)
ALP SERPL-CCNC: 38 U/L (ref 40–136)
ALT SERPL-CCNC: 43 U/L (ref 0–55)
ARTERIAL PATENCY WRIST A: (no result)
ARTERIAL PATENCY WRIST A: POSITIVE
BASE EXCESS STD BLDA CALC-SCNC: 0.9 MMOL/L (ref -2.5–2.5)
BASE EXCESS STD BLDA CALC-SCNC: 1.6 MMOL/L (ref -2.5–2.5)
BASOPHILS # BLD AUTO: 0 10^3/UL (ref 0–0.1)
BASOPHILS NFR BLD AUTO: 0 % (ref 0–10)
BDY SITE: (no result)
BDY SITE: (no result)
BILIRUB SERPL-MCNC: 0.7 MG/DL (ref 0.1–1)
BODY TEMPERATURE: 36.5
BODY TEMPERATURE: 36.9
BUN/CREAT SERPL: 28
CALCIUM SERPL-MCNC: 7.4 MG/DL (ref 8.5–10.1)
CHLORIDE SERPL-SCNC: 106 MMOL/L (ref 98–107)
CO2 BLDA CALC-SCNC: 25.9 MMOL/L (ref 21–31)
CO2 BLDA CALC-SCNC: 26.6 MMOL/L (ref 21–31)
CO2 SERPL-SCNC: 22 MMOL/L (ref 21–32)
CREAT SERPL-MCNC: 0.65 MG/DL (ref 0.6–1.3)
EOSINOPHIL # BLD AUTO: 0 10^3/UL (ref 0–0.3)
EOSINOPHIL NFR BLD AUTO: 0 % (ref 0–10)
GFR SERPLBLD BASED ON 1.73 SQ M-ARVRAT: 107 ML/MIN
GLUCOSE SERPL-MCNC: 172 MG/DL (ref 70–105)
HCT VFR BLD CALC: 40 % (ref 40–54)
HGB BLD-MCNC: 13.5 G/DL (ref 13.3–17.7)
INHALED O2 FLOW RATE: (no result) L/MIN
INHALED O2 FLOW RATE: (no result) L/MIN
LYMPHOCYTES # BLD AUTO: 0.4 10^3/UL (ref 1–4)
LYMPHOCYTES NFR BLD AUTO: 4 % (ref 12–44)
MAGNESIUM SERPL-MCNC: 2.5 MG/DL (ref 1.6–2.4)
MANUAL DIFFERENTIAL PERFORMED BLD QL: NO
MCH RBC QN AUTO: 33 PG (ref 25–34)
MCHC RBC AUTO-ENTMCNC: 34 G/DL (ref 32–36)
MCV RBC AUTO: 98 FL (ref 80–99)
MONOCYTES # BLD AUTO: 0.7 10^3/UL (ref 0–1)
MONOCYTES NFR BLD AUTO: 7 % (ref 0–12)
NEUTROPHILS # BLD AUTO: 8.5 10^3/UL (ref 1.8–7.8)
NEUTROPHILS NFR BLD AUTO: 89 % (ref 42–75)
PCO2 BLDA: 37 MMHG (ref 35–45)
PCO2 BLDA: 38 MMHG (ref 35–45)
PH BLDA: 7.44 [PH] (ref 7.37–7.43)
PH BLDA: 7.44 [PH] (ref 7.37–7.43)
PLATELET # BLD: 134 10^3/UL (ref 130–400)
PMV BLD AUTO: 11 FL (ref 9–12.2)
PO2 BLDA: 36 MMHG (ref 79–93)
PO2 BLDA: 74 MMHG (ref 79–93)
PO2 BLDA: 90 MMHG (ref 79–93)
POTASSIUM SERPL-SCNC: 3.6 MMOL/L (ref 3.6–5)
PROT SERPL-MCNC: 5.4 GM/DL (ref 6.4–8.2)
SAO2 % BLDA FROM PO2: 96 % (ref 94–100)
SAO2 % BLDA FROM PO2: 98 % (ref 94–100)
SODIUM SERPL-SCNC: 141 MMOL/L (ref 135–145)
VENTILATION MODE VENT: YES
VENTILATION MODE VENT: YES
WBC # BLD AUTO: 9.6 10^3/UL (ref 4.3–11)

## 2022-06-05 RX ADMIN — DEXMEDETOMIDINE HYDROCHLORIDE SCH MLS/HR: 100 INJECTION, SOLUTION, CONCENTRATE INTRAVENOUS at 12:24

## 2022-06-05 RX ADMIN — POTASSIUM CHLORIDE SCH MLS/HR: 200 INJECTION, SOLUTION INTRAVENOUS at 05:34

## 2022-06-05 RX ADMIN — POTASSIUM CHLORIDE SCH MEQ: 1500 TABLET, EXTENDED RELEASE ORAL at 05:12

## 2022-06-05 RX ADMIN — IPRATROPIUM BROMIDE AND ALBUTEROL SULFATE SCH ML: .5; 3 SOLUTION RESPIRATORY (INHALATION) at 02:00

## 2022-06-05 RX ADMIN — IPRATROPIUM BROMIDE AND ALBUTEROL SULFATE SCH ML: .5; 3 SOLUTION RESPIRATORY (INHALATION) at 23:03

## 2022-06-05 RX ADMIN — IPRATROPIUM BROMIDE AND ALBUTEROL SULFATE SCH ML: .5; 3 SOLUTION RESPIRATORY (INHALATION) at 10:00

## 2022-06-05 RX ADMIN — IPRATROPIUM BROMIDE AND ALBUTEROL SULFATE SCH ML: .5; 3 SOLUTION RESPIRATORY (INHALATION) at 06:40

## 2022-06-05 RX ADMIN — Medication SCH MG: at 05:44

## 2022-06-05 RX ADMIN — POTASSIUM CHLORIDE SCH MLS/HR: 200 INJECTION, SOLUTION INTRAVENOUS at 06:20

## 2022-06-05 RX ADMIN — SODIUM CHLORIDE SCH MLS/HR: 900 INJECTION, SOLUTION INTRAVENOUS at 15:45

## 2022-06-05 RX ADMIN — DOCUSATE SODIUM SCH MG: 100 CAPSULE ORAL at 20:54

## 2022-06-05 RX ADMIN — PROPOFOL SCH MLS/HR: 10 INJECTION, EMULSION INTRAVENOUS at 00:09

## 2022-06-05 RX ADMIN — INSULIN ASPART SCH UNIT: 100 INJECTION, SOLUTION INTRAVENOUS; SUBCUTANEOUS at 20:33

## 2022-06-05 RX ADMIN — IPRATROPIUM BROMIDE AND ALBUTEROL SULFATE SCH ML: .5; 3 SOLUTION RESPIRATORY (INHALATION) at 00:43

## 2022-06-05 RX ADMIN — POTASSIUM CHLORIDE SCH MLS/HR: 200 INJECTION, SOLUTION INTRAVENOUS at 05:11

## 2022-06-05 RX ADMIN — INSULIN ASPART SCH UNIT: 100 INJECTION, SOLUTION INTRAVENOUS; SUBCUTANEOUS at 12:00

## 2022-06-05 RX ADMIN — METOPROLOL TARTRATE SCH MG: 25 TABLET, FILM COATED ORAL at 09:48

## 2022-06-05 RX ADMIN — SODIUM CHLORIDE, SODIUM LACTATE, POTASSIUM CHLORIDE, AND CALCIUM CHLORIDE SCH MLS/HR: 600; 310; 30; 20 INJECTION, SOLUTION INTRAVENOUS at 12:24

## 2022-06-05 RX ADMIN — ENOXAPARIN SODIUM SCH MG: 100 INJECTION SUBCUTANEOUS at 20:33

## 2022-06-05 RX ADMIN — SODIUM CHLORIDE SCH MLS/HR: 900 INJECTION, SOLUTION INTRAVENOUS at 05:34

## 2022-06-05 RX ADMIN — DOCUSATE SODIUM SCH MG: 100 CAPSULE ORAL at 09:48

## 2022-06-05 RX ADMIN — SODIUM CHLORIDE SCH MLS/HR: 900 INJECTION, SOLUTION INTRAVENOUS at 10:45

## 2022-06-05 RX ADMIN — INSULIN ASPART SCH UNIT: 100 INJECTION, SOLUTION INTRAVENOUS; SUBCUTANEOUS at 05:12

## 2022-06-05 RX ADMIN — METOPROLOL TARTRATE SCH MG: 25 TABLET, FILM COATED ORAL at 22:59

## 2022-06-05 RX ADMIN — SODIUM CHLORIDE SCH MLS/HR: 900 INJECTION INTRAVENOUS at 07:52

## 2022-06-05 RX ADMIN — SODIUM CHLORIDE SCH MLS/HR: 900 INJECTION, SOLUTION INTRAVENOUS at 20:54

## 2022-06-05 RX ADMIN — SODIUM CHLORIDE SCH MLS/HR: 900 INJECTION, SOLUTION INTRAVENOUS at 01:10

## 2022-06-05 RX ADMIN — LISINOPRIL SCH MG: 10 TABLET ORAL at 09:48

## 2022-06-05 RX ADMIN — SODIUM CHLORIDE SCH MLS/HR: 900 INJECTION INTRAVENOUS at 00:12

## 2022-06-05 RX ADMIN — ENOXAPARIN SODIUM SCH MG: 100 INJECTION SUBCUTANEOUS at 09:48

## 2022-06-05 RX ADMIN — DEXMEDETOMIDINE HYDROCHLORIDE SCH MLS/HR: 100 INJECTION, SOLUTION, CONCENTRATE INTRAVENOUS at 01:45

## 2022-06-05 RX ADMIN — INSULIN ASPART SCH UNIT: 100 INJECTION, SOLUTION INTRAVENOUS; SUBCUTANEOUS at 00:21

## 2022-06-05 RX ADMIN — IPRATROPIUM BROMIDE AND ALBUTEROL SULFATE SCH ML: .5; 3 SOLUTION RESPIRATORY (INHALATION) at 15:12

## 2022-06-05 RX ADMIN — MAGNESIUM SULFATE IN DEXTROSE SCH MLS/HR: 10 INJECTION, SOLUTION INTRAVENOUS at 05:12

## 2022-06-05 RX ADMIN — IPRATROPIUM BROMIDE AND ALBUTEROL SULFATE SCH ML: .5; 3 SOLUTION RESPIRATORY (INHALATION) at 19:29

## 2022-06-05 RX ADMIN — PROPOFOL SCH MLS/HR: 10 INJECTION, EMULSION INTRAVENOUS at 09:07

## 2022-06-05 RX ADMIN — ASPIRIN 81 MG CHEWABLE TABLET SCH MG: 81 TABLET CHEWABLE at 09:48

## 2022-06-05 RX ADMIN — METHYLPREDNISOLONE SODIUM SUCCINATE SCH MG: 125 INJECTION, POWDER, FOR SOLUTION INTRAMUSCULAR; INTRAVENOUS at 09:48

## 2022-06-05 RX ADMIN — FAMOTIDINE SCH MG: 10 INJECTION INTRAVENOUS at 20:33

## 2022-06-05 RX ADMIN — FAMOTIDINE SCH MG: 10 INJECTION INTRAVENOUS at 09:48

## 2022-06-05 RX ADMIN — METHYLPREDNISOLONE SODIUM SUCCINATE SCH MG: 125 INJECTION, POWDER, FOR SOLUTION INTRAMUSCULAR; INTRAVENOUS at 20:33

## 2022-06-05 RX ADMIN — SODIUM CHLORIDE SCH MLS/HR: 900 INJECTION INTRAVENOUS at 16:37

## 2022-06-05 NOTE — CARDIOLOGY PROGRESS NOTE
Subjective


Date Seen by Provider:  Jun 5, 2022


Time Seen by Provider:  10:00


Subjective/Events-last exam


Patient was seen and evaluated this morning


He was off sedation and being weaned off the ventilator


Review of Systems


General:  Other (Unable to provide review of system)





Objective-Cardiology


Exam


Last Set of Vital Signs





Vital Signs








 6/5/22 6/5/22 6/5/22 6/5/22 6/5/22 6/5/22





 08:00 11:11 12:00 12:23 12:24 12:49


 


Temp 35.8     


 


Pulse      91


 


Resp   18   


 


B/P (MAP)     123/61 


 


Pulse Ox   100   


 


O2 Delivery    Nasal Cannula  


 


O2 Flow Rate    5.00  


 


FiO2  30    








I&O











Intake and Output 


 


 6/5/22





 00:00


 


Intake Total 880 ml


 


Output Total 1225 ml


 


Balance -345 ml


 


 


 


IV Total 400 ml


 


Other 480 ml


 


Output Urine Total 1225 ml


 


# Bowel Movements 1








General:  Other (Sedated and intubated)


HEENT:  Atraumatic


Neck:  Supple


Lungs:  Normal Air Movement, Other (Bilateral rhonchi)


Heart:  Regular Rate, Normal S1, Normal S2


Abdomen:  Normal Bowel Sounds


Extremities:  No Clubbing


Skin:  No Rashes, No Breakdown


Neuro:  Other (Sedated and intubated)


Psych/Mental Status:  Other (Sedated and intubated)





Results


Lab


Laboratory Tests


6/5/22 00:43














A/P-Cardiology


Admission Diagnosis


Acute respiratory failure


Acute exacerbation of COPD


Non-ST elevation myocardial infarction, type II MI


Aortic valve stenosis





Assessment/Plan


Acute respiratory failure, ventilator dependent.


Acute exacerbation of COPD with pneumonia.


Receiving antibiotics.


Being weaned off the ventilator, managed by medical team





Coronary artery disease, non-ST elevation myocardial infarction, probably type 

II myocardial infarction secondary to severe hypoxemia and respiratory failure.


Underlying coronary artery disease cannot be entirely excluded.  We will 

continue monitoring and proceed with conservative management for now.


Managed by Dr. Pereira





Congestive heart failure, acute left ventricular diastolic dysfunction, 

preserved systolic function.  Continue to monitor





Aortic valve stenosis, aortic regurgitation.


2D echo was reported by Dr. Pereira as ejection fraction 55%, grade 2 diastolic 

dysfunction, moderate aortic valve stenosis with peak gradient 58 mmHg, mean gr

adient 30 mmHg, valve area 1.1 cm, moderate aortic regurgitation, moderate 

tricuspid regurgitation, estimated pulmonary artery pressure around 40 mmHg.





Hypertension, continue to monitor blood pressure





Hyperlipidemia, monitor lipids.





History of CVA with right hemiparesis.  Has been maintained on aspirin as an 

outpatient





Tobaccoism, still an active smoker.











SARAH BURDICK MD               Jun 5, 2022 12:51

## 2022-06-05 NOTE — DIAGNOSTIC IMAGING REPORT
EXAMINATION: Chest 1 view



HISTORY: Respiratory failure



COMPARISON: 06/04/2022



FINDINGS: 



Endotracheal tube tip terminates 4 cm above the alia. Gastric

tube tip terminates below the field of view. There is increasing

opacification in the right lung base. No pneumothorax. Heart size

normal.



IMPRESSION: 



1. Increasing opacification of the right lung base may represent

worsening atelectasis or pneumonia.



Dictated by: 



  Dictated on workstation # [id number]

## 2022-06-05 NOTE — PROGRESS NOTE - HOSPITALIST
Subjective


HPI/CC On Admission


Date Seen by Provider:  Jun 5, 2022


Time Seen by Provider:  08:00


CC: SOB





HPI: This is a 62 yr old WM with a history of stroke with right sided 

hemiparesis. In addition to COPD. Pt continues to smoke. He presented to the ER 

in [place] with SOB. He was found to have exacerbation of COPD and acute on 

chronic respiratory failure. At this current time pt has remained stable and 

will be transferred to the 4th floor. IV steroids reviewed and pt will remain on

oxygen.


Subjective/Events-last exam


Patient sedated on ventilation no reported problems with care.  When they do 

lessen propofol he opens his eyes does tend to want a bite the tube but no 

attempts at actually trying to pull the tube out per staff report.  No problems 

with care otherwise.





Objective


Exam


Vital Signs





Vital Signs








  Date Time  Temp Pulse Resp B/P (MAP) Pulse Ox O2 Delivery O2 Flow Rate FiO2


 


6/5/22 09:07  81  129/72    


 


6/5/22 09:00   14  100 Mechanical Ventilator 30.00 


 


6/5/22 08:00 35.8       


 


6/5/22 08:00        30





Capillary Refill : Less Than 3 Seconds


General Appearance:  No Apparent Distress


Cardiovascular:  Regular Rate, Rhythm, No Edema, No Gallop, No JVD, Normal 

Peripheral Pulses, Systolic Murmur (2/6 heard best at second intercostal space.)


Gastrointestinal:  Non Tender, Soft, Other (  Bowel sounds present but 

hypoactive unchanged from yesterday no mass noted.)





Results/Procedures


Lab


Laboratory Tests


6/5/22 00:43








Patient resulted labs reviewed.





Assessment/Plan


Assessment and Plan


Assess & Plan/Chief Complaint


(1) Acute exacerbation of chronic obstructive pulmonary disease (COPD)


Status: Likely due to right lower lobe pneumonia.  While chest x-ray shows littl

e more consolidation in the right lower lobe the patient is clinically 

responding he is afebrile white count is normalized and he is not wheezing today

with dose reduction of Solu-Medrol yesterday from 60 every 8 to 60 every 12.  We

will give a dose of fentanyl decrease propofol and initiate pressure support 

trial today.  If the patient tolerates this well there may be consideration for 

extubation later today.  Patient is BUN to creatinine is increasing only getting

about 1200 cc of fluid IV we will give another 1200 in the form of lactated 

Ringer's 50 cc/h.


(3) Nonrheumatic aortic valve stenosis with regurgitation


(4) Primary hypertension


(5) Mixed hyperlipidemia





Critical Care


Ventilator Management











MICHAEL QUESADA MD               Jun 5, 2022 10:08

## 2022-06-06 VITALS — DIASTOLIC BLOOD PRESSURE: 79 MMHG | SYSTOLIC BLOOD PRESSURE: 144 MMHG

## 2022-06-06 VITALS — SYSTOLIC BLOOD PRESSURE: 127 MMHG | DIASTOLIC BLOOD PRESSURE: 69 MMHG

## 2022-06-06 VITALS — DIASTOLIC BLOOD PRESSURE: 69 MMHG | SYSTOLIC BLOOD PRESSURE: 130 MMHG

## 2022-06-06 VITALS — SYSTOLIC BLOOD PRESSURE: 106 MMHG | DIASTOLIC BLOOD PRESSURE: 60 MMHG

## 2022-06-06 VITALS — SYSTOLIC BLOOD PRESSURE: 138 MMHG | DIASTOLIC BLOOD PRESSURE: 74 MMHG

## 2022-06-06 VITALS — DIASTOLIC BLOOD PRESSURE: 111 MMHG | SYSTOLIC BLOOD PRESSURE: 161 MMHG

## 2022-06-06 VITALS — SYSTOLIC BLOOD PRESSURE: 147 MMHG | DIASTOLIC BLOOD PRESSURE: 96 MMHG

## 2022-06-06 VITALS — DIASTOLIC BLOOD PRESSURE: 79 MMHG | SYSTOLIC BLOOD PRESSURE: 151 MMHG

## 2022-06-06 VITALS — SYSTOLIC BLOOD PRESSURE: 142 MMHG | DIASTOLIC BLOOD PRESSURE: 69 MMHG

## 2022-06-06 VITALS — DIASTOLIC BLOOD PRESSURE: 75 MMHG | SYSTOLIC BLOOD PRESSURE: 126 MMHG

## 2022-06-06 VITALS — DIASTOLIC BLOOD PRESSURE: 72 MMHG | SYSTOLIC BLOOD PRESSURE: 131 MMHG

## 2022-06-06 VITALS — SYSTOLIC BLOOD PRESSURE: 133 MMHG | DIASTOLIC BLOOD PRESSURE: 70 MMHG

## 2022-06-06 VITALS — DIASTOLIC BLOOD PRESSURE: 82 MMHG | SYSTOLIC BLOOD PRESSURE: 145 MMHG

## 2022-06-06 VITALS — SYSTOLIC BLOOD PRESSURE: 129 MMHG | DIASTOLIC BLOOD PRESSURE: 81 MMHG

## 2022-06-06 VITALS — DIASTOLIC BLOOD PRESSURE: 82 MMHG | SYSTOLIC BLOOD PRESSURE: 124 MMHG

## 2022-06-06 VITALS — SYSTOLIC BLOOD PRESSURE: 138 MMHG | DIASTOLIC BLOOD PRESSURE: 77 MMHG

## 2022-06-06 VITALS — SYSTOLIC BLOOD PRESSURE: 128 MMHG | DIASTOLIC BLOOD PRESSURE: 67 MMHG

## 2022-06-06 VITALS — SYSTOLIC BLOOD PRESSURE: 124 MMHG | DIASTOLIC BLOOD PRESSURE: 67 MMHG

## 2022-06-06 VITALS — DIASTOLIC BLOOD PRESSURE: 72 MMHG | SYSTOLIC BLOOD PRESSURE: 139 MMHG

## 2022-06-06 VITALS — DIASTOLIC BLOOD PRESSURE: 72 MMHG | SYSTOLIC BLOOD PRESSURE: 140 MMHG

## 2022-06-06 VITALS — DIASTOLIC BLOOD PRESSURE: 69 MMHG | SYSTOLIC BLOOD PRESSURE: 135 MMHG

## 2022-06-06 VITALS — SYSTOLIC BLOOD PRESSURE: 129 MMHG | DIASTOLIC BLOOD PRESSURE: 70 MMHG

## 2022-06-06 VITALS — DIASTOLIC BLOOD PRESSURE: 67 MMHG | SYSTOLIC BLOOD PRESSURE: 125 MMHG

## 2022-06-06 VITALS — SYSTOLIC BLOOD PRESSURE: 132 MMHG | DIASTOLIC BLOOD PRESSURE: 73 MMHG

## 2022-06-06 LAB
ALBUMIN SERPL-MCNC: 3 GM/DL (ref 3.2–4.5)
ALP SERPL-CCNC: 33 U/L (ref 40–136)
ALT SERPL-CCNC: 45 U/L (ref 0–55)
BASOPHILS # BLD AUTO: 0 10^3/UL (ref 0–0.1)
BASOPHILS NFR BLD AUTO: 0 % (ref 0–10)
BILIRUB SERPL-MCNC: 0.9 MG/DL (ref 0.1–1)
BUN/CREAT SERPL: 27
CALCIUM SERPL-MCNC: 7.6 MG/DL (ref 8.5–10.1)
CHLORIDE SERPL-SCNC: 108 MMOL/L (ref 98–107)
CO2 SERPL-SCNC: 21 MMOL/L (ref 21–32)
CREAT SERPL-MCNC: 0.59 MG/DL (ref 0.6–1.3)
EOSINOPHIL # BLD AUTO: 0 10^3/UL (ref 0–0.3)
EOSINOPHIL NFR BLD AUTO: 0 % (ref 0–10)
GFR SERPLBLD BASED ON 1.73 SQ M-ARVRAT: 110 ML/MIN
GLUCOSE SERPL-MCNC: 125 MG/DL (ref 70–105)
HCT VFR BLD CALC: 41 % (ref 40–54)
HGB BLD-MCNC: 13.7 G/DL (ref 13.3–17.7)
LYMPHOCYTES # BLD AUTO: 0.7 10^3/UL (ref 1–4)
LYMPHOCYTES NFR BLD AUTO: 7 % (ref 12–44)
MAGNESIUM SERPL-MCNC: 2.4 MG/DL (ref 1.6–2.4)
MANUAL DIFFERENTIAL PERFORMED BLD QL: NO
MCH RBC QN AUTO: 33 PG (ref 25–34)
MCHC RBC AUTO-ENTMCNC: 33 G/DL (ref 32–36)
MCV RBC AUTO: 99 FL (ref 80–99)
MONOCYTES # BLD AUTO: 0.7 10^3/UL (ref 0–1)
MONOCYTES NFR BLD AUTO: 7 % (ref 0–12)
NEUTROPHILS # BLD AUTO: 9 10^3/UL (ref 1.8–7.8)
NEUTROPHILS NFR BLD AUTO: 86 % (ref 42–75)
PLATELET # BLD: 149 10^3/UL (ref 130–400)
PMV BLD AUTO: 10.9 FL (ref 9–12.2)
POTASSIUM SERPL-SCNC: 4.4 MMOL/L (ref 3.6–5)
PROT SERPL-MCNC: 5.4 GM/DL (ref 6.4–8.2)
SODIUM SERPL-SCNC: 139 MMOL/L (ref 135–145)
WBC # BLD AUTO: 10.5 10^3/UL (ref 4.3–11)

## 2022-06-06 PROCEDURE — 027034Z DILATION OF CORONARY ARTERY, ONE ARTERY WITH DRUG-ELUTING INTRALUMINAL DEVICE, PERCUTANEOUS APPROACH: ICD-10-PCS | Performed by: INTERNAL MEDICINE

## 2022-06-06 PROCEDURE — 4A023N7 MEASUREMENT OF CARDIAC SAMPLING AND PRESSURE, LEFT HEART, PERCUTANEOUS APPROACH: ICD-10-PCS | Performed by: INTERNAL MEDICINE

## 2022-06-06 PROCEDURE — B2111ZZ FLUOROSCOPY OF MULTIPLE CORONARY ARTERIES USING LOW OSMOLAR CONTRAST: ICD-10-PCS | Performed by: INTERNAL MEDICINE

## 2022-06-06 RX ADMIN — INSULIN ASPART SCH UNIT: 100 INJECTION, SOLUTION INTRAVENOUS; SUBCUTANEOUS at 11:50

## 2022-06-06 RX ADMIN — DOCUSATE SODIUM SCH MG: 100 CAPSULE ORAL at 09:30

## 2022-06-06 RX ADMIN — INSULIN ASPART SCH UNIT: 100 INJECTION, SOLUTION INTRAVENOUS; SUBCUTANEOUS at 00:53

## 2022-06-06 RX ADMIN — DOCUSATE SODIUM SCH MG: 100 CAPSULE ORAL at 21:54

## 2022-06-06 RX ADMIN — IPRATROPIUM BROMIDE AND ALBUTEROL SULFATE SCH ML: .5; 3 SOLUTION RESPIRATORY (INHALATION) at 13:40

## 2022-06-06 RX ADMIN — METOPROLOL TARTRATE SCH MG: 25 TABLET, FILM COATED ORAL at 21:41

## 2022-06-06 RX ADMIN — INSULIN ASPART SCH UNIT: 100 INJECTION, SOLUTION INTRAVENOUS; SUBCUTANEOUS at 18:00

## 2022-06-06 RX ADMIN — SODIUM CHLORIDE SCH MLS/HR: 900 INJECTION, SOLUTION INTRAVENOUS at 13:45

## 2022-06-06 RX ADMIN — SODIUM CHLORIDE, SODIUM LACTATE, POTASSIUM CHLORIDE, AND CALCIUM CHLORIDE SCH MLS/HR: 600; 310; 30; 20 INJECTION, SOLUTION INTRAVENOUS at 18:24

## 2022-06-06 RX ADMIN — METHYLPREDNISOLONE SODIUM SUCCINATE SCH MG: 125 INJECTION, POWDER, FOR SOLUTION INTRAMUSCULAR; INTRAVENOUS at 21:42

## 2022-06-06 RX ADMIN — INSULIN ASPART SCH UNIT: 100 INJECTION, SOLUTION INTRAVENOUS; SUBCUTANEOUS at 22:13

## 2022-06-06 RX ADMIN — LORAZEPAM PRN MG: 2 INJECTION INTRAMUSCULAR; INTRAVENOUS at 13:19

## 2022-06-06 RX ADMIN — LISINOPRIL SCH MG: 10 TABLET ORAL at 09:30

## 2022-06-06 RX ADMIN — POTASSIUM CHLORIDE SCH MLS/HR: 200 INJECTION, SOLUTION INTRAVENOUS at 05:23

## 2022-06-06 RX ADMIN — IPRATROPIUM BROMIDE AND ALBUTEROL SULFATE SCH ML: .5; 3 SOLUTION RESPIRATORY (INHALATION) at 20:57

## 2022-06-06 RX ADMIN — ASPIRIN 81 MG CHEWABLE TABLET SCH MG: 81 TABLET CHEWABLE at 09:30

## 2022-06-06 RX ADMIN — POTASSIUM CHLORIDE SCH MEQ: 1500 TABLET, EXTENDED RELEASE ORAL at 05:23

## 2022-06-06 RX ADMIN — ENOXAPARIN SODIUM SCH MG: 100 INJECTION SUBCUTANEOUS at 09:30

## 2022-06-06 RX ADMIN — INSULIN ASPART SCH UNIT: 100 INJECTION, SOLUTION INTRAVENOUS; SUBCUTANEOUS at 05:23

## 2022-06-06 RX ADMIN — ACETAMINOPHEN PRN MG: 325 TABLET ORAL at 13:19

## 2022-06-06 RX ADMIN — SODIUM CHLORIDE, SODIUM LACTATE, POTASSIUM CHLORIDE, AND CALCIUM CHLORIDE SCH MLS/HR: 600; 310; 30; 20 INJECTION, SOLUTION INTRAVENOUS at 10:27

## 2022-06-06 RX ADMIN — SODIUM CHLORIDE SCH MLS/HR: 900 INJECTION, SOLUTION INTRAVENOUS at 22:13

## 2022-06-06 RX ADMIN — SODIUM CHLORIDE, SODIUM LACTATE, POTASSIUM CHLORIDE, AND CALCIUM CHLORIDE SCH MLS/HR: 600; 310; 30; 20 INJECTION, SOLUTION INTRAVENOUS at 00:59

## 2022-06-06 RX ADMIN — MAGNESIUM SULFATE IN DEXTROSE SCH MLS/HR: 10 INJECTION, SOLUTION INTRAVENOUS at 05:23

## 2022-06-06 RX ADMIN — IPRATROPIUM BROMIDE AND ALBUTEROL SULFATE SCH ML: .5; 3 SOLUTION RESPIRATORY (INHALATION) at 01:37

## 2022-06-06 RX ADMIN — METHYLPREDNISOLONE SODIUM SUCCINATE SCH MG: 125 INJECTION, POWDER, FOR SOLUTION INTRAMUSCULAR; INTRAVENOUS at 09:50

## 2022-06-06 RX ADMIN — METOPROLOL TARTRATE SCH MG: 25 TABLET, FILM COATED ORAL at 13:46

## 2022-06-06 RX ADMIN — ENOXAPARIN SODIUM SCH MG: 100 INJECTION SUBCUTANEOUS at 21:42

## 2022-06-06 RX ADMIN — FAMOTIDINE SCH MG: 10 INJECTION INTRAVENOUS at 09:51

## 2022-06-06 RX ADMIN — LORAZEPAM PRN MG: 1 TABLET ORAL at 16:39

## 2022-06-06 RX ADMIN — IPRATROPIUM BROMIDE AND ALBUTEROL SULFATE SCH ML: .5; 3 SOLUTION RESPIRATORY (INHALATION) at 08:02

## 2022-06-06 RX ADMIN — METOPROLOL TARTRATE SCH MG: 25 TABLET, FILM COATED ORAL at 09:30

## 2022-06-06 RX ADMIN — SODIUM CHLORIDE SCH MLS/HR: 900 INJECTION INTRAVENOUS at 00:59

## 2022-06-06 RX ADMIN — SODIUM CHLORIDE SCH MLS/HR: 900 INJECTION, SOLUTION INTRAVENOUS at 01:03

## 2022-06-06 RX ADMIN — LISINOPRIL SCH MG: 10 TABLET ORAL at 13:46

## 2022-06-06 RX ADMIN — Medication SCH MG: at 06:13

## 2022-06-06 RX ADMIN — FAMOTIDINE SCH MG: 10 INJECTION INTRAVENOUS at 21:42

## 2022-06-06 NOTE — CONSCIOUS SEDATION/ASA
Conscious Sedation Pre-Proced


Time


09:53





ASA Score


3


For ASA 3 and 4: Consider anesthesia and medical clearance. Also, for patients

with a history of failed moderate sedation consider anesthesia.

















Airway 


 


Lungs 


 


Heart 


 


 ASA score


 


 ASA 1: a normal healthy patient


 


 ASA 2:  a patient with a mild systemic disease (mid diabetes, controlled 

hypertension, obesity 


 


x ASA 3:  a patient with a severe systemic disease that limits activity  

(angina, COPD, prior Myocardial infarction)


 


 ASA 4:  a patient with an incapacitating disease that is a constant threat to 

life (CHF, renal failure)


 


 ASA 5:  a moribund patient not expected to survive 24 hrs.  (ruptured aneurysm)


 


 ASA 6:  a declared brain-dead patient whose organs are being harvested.


 


 For emergent operations, add the letter E after the classification











Mallampati Classification


Grade 3





Sedation Plan


Analgesia, Amnesia, Plan communicated to team members, Discussed options with 

patient/fam, Discussed risks with patient/fam


The patient is an appropriate candidate to undergo the planned procedure, 

sedation, and anesthesia.





The patient immediately re-assessed prior to indication.











SARAH BURDICK MD               Jun 6, 2022 09:53

## 2022-06-06 NOTE — TELE-ICU PROGRESS NOTE
Progress Note


62M with COPD, CVA presented with fever, cough, SOB, increased WOB, diarrhea x 

1.5 days. On arrival, SpO2 in upper 60s. Placed on BiPap on arrival.





- COPD: with exacerbation. Solumedrol, nebs, bipap.


- CHF: unclear if CHF component. Received lasix in ER without much output. 

Overall, appears dry with the exception of the BNP 8K. Agree with gentle fluids,

close observation for evidence of evolving fluid overload. 


- sepsis: presumed pulmonary source. Cultures pending. Empiric abx initiated. 


- DM: glucose decreased from 200 to 170 despite being given steroids. Sliding 

scale ordered. Monitor for steroid induced hyperglycemia, but appears low risk 

at this time.





Focused Exam


Lactate Level


5/30/22 19:00: Lactic Acid Level 5.74*H


5/30/22 21:00: 


Height, Weight, BMI


Height: '"


Weight: lbs. oz. kg; 19.99 BMI


Method:


Lactic Acid Level





Laboratory Tests








Test


 5/30/22


19:00 5/30/22


21:00


 


Lactic Acid Level


 5.74 MMOL/L


(0.50-2.00)  *H 




















WINIFRED LLAMAS MD                May 30, 2022 21:15
Progress Note


video rounds completed





63 y/o with severe COPD and vent dependent


Plan for trach and PEG this week


Onsedation with propfol and precedex and intermittent ativan





Vent: 12/450/30%/5





Focused Exam


Height, Weight, BMI


Height: '"


Weight: lbs. oz. kg; 24.46 BMI


Method:


Laboratory Tests


6/5/22 00:43











Results


Results/Procedures


Labs


Laboratory Tests


6/4/22 03:51








6/5/22 00:43








Patient resulted labs reviewed.





Results


Labs


Labs


Laboratory Tests


6/4/22 11:50: 


Blood Gas Puncture Site LT RAD, Blood Gas Patient Temperature 36.3, Arterial 

Blood pH 7.47H, Arterial Blood Partial Pressure CO2 35, Arterial Blood Partial 

Pressure O2 69L, Arterial Blood HCO3 25, Arterial Blood Total CO2 25.9, Arterial

Blood Oxygen Saturation 96, Arterial Blood Base Excess 1.3, Hansel Test YES-POS, 

Blood Gas Ventilator Setting YES, Blood Gas Inspired Oxygen 30%


6/4/22 11:56: Glucometer 158H


6/4/22 17:58: Glucometer 148H


6/5/22 00:20: Glucometer 177H


6/5/22 00:43: 


White Blood Count 9.6, Red Blood Count 4.09L, Hemoglobin 13.5, Hematocrit 40, 

Mean Corpuscular Volume 98, Mean Corpuscular Hemoglobin 33, Mean Corpuscular 

Hemoglobin Concent 34, Red Cell Distribution Width 14.0, Platelet Count 134, 

Mean Platelet Volume 11.0, Immature Granulocyte % (Auto) 1, Neutrophils (%) 

(Auto) 89H, Lymphocytes (%) (Auto) 4L, Monocytes (%) (Auto) 7, Eosinophils (%) 

(Auto) 0, Basophils (%) (Auto) 0, Neutrophils # (Auto) 8.5H, Lymphocytes # 

(Auto) 0.4L, Monocytes # (Auto) 0.7, Eosinophils # (Auto) 0.0, Basophils # 

(Auto) 0.0, Immature Granulocyte # (Auto) 0.1, Percent Immature Platelet Fr

action 6.5, Sodium Level 141, Potassium Level 3.6, Chloride Level 106, Carbon 

Dioxide Level 22, Anion Gap 13, Blood Urea Nitrogen 18, Creatinine 0.65, Estimat

Glomerular Filtration Rate 107, BUN/Creatinine Ratio 28, Glucose Level 172H, 

Calcium Level 7.4L, Corrected Calcium 8.2L, Magnesium Level 2.5H, Total Bilirubi

n 0.7, Aspartate Amino Transf (AST/SGOT) 28, Alanine Aminotransferase (ALT/SGPT)

43, Alkaline Phosphatase 38L, Total Protein 5.4L, Albumin 3.0L


6/5/22 05:00: 


Blood Gas Puncture Site RIGHT WRIST, Blood Gas Patient Temperature 36.9, 

Arterial Blood pH 7.44H, Arterial Blood Partial Pressure CO2 38, Arterial Blood 

Partial Pressure O2 74L, Arterial Blood HCO3 25, Arterial Blood Total CO2 26.6, 

Arterial Blood Oxygen Saturation 96, Arterial Blood Base Excess 1.6, Hansel Test 

POSITIVE, Blood Gas Ventilator Setting YES, Blood Gas Inspired Oxygen 60%





Microbiology


5/31/22 Gram Stain - Final, Complete


          


5/31/22 Sputum Culture - Final, Complete


          Usual upper respiratory km


5/30/22 Blood Culture - Preliminary, Resulted


          No growth











HUSEYIN LALA MD             Jun 5, 2022 09:42
Subjective


Date Seen by a Provider:  2022


Time Seen by a Provider:  08:05


Subjective/Events-last exam


This virtual visit was conducted using real time audio/video.


Thank you for asking us to see this patient for respiratory insufficiency due to

AECOPD, CHF, NSTEMI. Intubated 22.


Recent events: Agitated w decreased sedation. PRN sedatives added.





     PE: VSS.  O2 sat 100% on AC 16/450/35%/+5.


     HEENT: No obvious masses, adenopathy or JVD.


              Chest: clear to auscultation.  Diminished.


              CV: RRR S1 S2 No murmur or added sounds.


              Abd: Non-tender. Bowel sounds Y.


              : Unremarkable. Pizano Y.


              CNS/psychiatric: Grossly intact. No obvious focal findings.


              Extremities: No edema. Capillary refill < 3 seconds.


              Skin: unremarkable.





Results: Elevated .   Decreased K 3.1.  B.47/36/72.    CXR: 

Hyperinflated, RLL infilt.


Available chart/ vitals / labs / images reviewed.


Video assessment done using teleICU camera, rest of exam as per RN.





A/P:  Respiratory insufficiency: Continue present management with vent., prop., 

prec., duonebs, medrol. Wean sedation as iban. Minute ventilation decreased. ABG 

in 2-3 hours.


          Monitor for increasing oxygenation needs.


          Critical Care: critically ill patient. Cont. ASA, statin, Bbl., lov., 

abx, pepcid, lisin., SSI. Replace K.





Discussed with RN Erin and RT. Asked RN to reach out to eICU if any questions 

or concerns later. 


Time spent with patient/coordination of care with other health professionals 

(mins): 25.





Sepsis Event


Evaluation


Height, Weight, BMI


Height: '"


Weight: lbs. oz. kg; 24.46 BMI


Method:





Exam


Exam


Patient acknowledged, consented, and participated in this virtual visit which 

was conducted using real time audio/video


Vital Signs








  Date Time  Temp Pulse Resp B/P (MAP) Pulse Ox O2 Delivery O2 Flow Rate FiO2


 


22 09:00  93 20  99 Mechanical Ventilator 30.00 


 


22 08:00     100 Mechanical Ventilator  30


 


22 08:00  77 21 127/67 (87) 97 Mechanical Ventilator 30.00 


 


22 07:40      Mechanical Ventilator 30.00 


 


22 07:39        30


 


22 07:17  89 21  100   25


 


22 07:00  88 20 127/72 (90) 99 Mechanical Ventilator 25.00 


 


22 07:00  88      


 


22 06:00  87 18  99 Mechanical Ventilator 25.00 


 


22 05:00  90 19  96 Mechanical Ventilator 25.00 


 


22 04:00     100 Mechanical Ventilator  25


 


22 04:00  91 16  97 Mechanical Ventilator 25.00 


 


22 03:39        25


 


22 03:23  92  141/79    


 


22 03:00  89 20  98 Mechanical Ventilator 25.00 


 


22 02:00  89 18 141/79 (99) 100 Mechanical Ventilator 25.00 


 


22 01:10  92  129/76    


 


22 01:00  90      


 


22 00:39 35.9 92 20 129/76 (93) 100 Mechanical Ventilator 25.00 


 


22 00:00  90 21 137/77 (97) 100 Mechanical Ventilator 30.00 


 


6/3/22 23:49     100 Mechanical Ventilator  25


 


6/3/22 23:39        25


 


6/3/22 23:00  91 19 121/71 (88) 100 Mechanical Ventilator 30.00 


 


6/3/22 22:00  90 20 125/63 (83) 100 Mechanical Ventilator 30.00 


 


6/3/22 21:01  89 16  97   25


 


6/3/22 21:00  96 14 122/65 (84) 100 Mechanical Ventilator 30.00 


 


6/3/22 20:00     100 Mechanical Ventilator  25


 


6/3/22 20:00  98 20 128/72 (90) 98 Mechanical Ventilator 30.00 


 


6/3/22 19:56 36.5       


 


6/3/22 19:39        30


 


6/3/22 19:27  97 20  97   25


 


6/3/22 19:00  97 24 123/68 (86) 96 Mechanical Ventilator 30.00 


 


6/3/22 19:00  100      


 


6/3/22 18:00  97 19 134/66 (88) 100 Mechanical Ventilator 30.00 


 


6/3/22 17:36  99  120/61    


 


6/3/22 17:00  100 21 131/64 (86) 100 Mechanical Ventilator 30.00 


 


6/3/22 16:00  100 22 138/72 (94) 100 Mechanical Ventilator 30.00 


 


6/3/22 16:00     100 Mechanical Ventilator  25


 


6/3/22 15:39        30


 


6/3/22 15:00  101 27 129/74 (92) 100 Mechanical Ventilator 30.00 


 


6/3/22 14:53  96 21  100   30


 


6/3/22 14:00  99 20 139/64 (89) 100 Mechanical Ventilator 30.00 


 


6/3/22 13:00  101 21 129/67 (87) 100 Mechanical Ventilator 30.00 


 


6/3/22 12:34  102      


 


6/3/22 12:00 38.2       


 


6/3/22 12:00  102 23 111/62 (78) 100 Mechanical Ventilator 30.00 


 


6/3/22 12:00     100 Mechanical Ventilator  30


 


6/3/22 11:57 37.7       


 


6/3/22 11:39        30


 


6/3/22 11:27 38.3       


 


6/3/22 11:00  101 22 124/68 (86) 100 Mechanical Ventilator 30.00 


 


6/3/22 10:51  98 26  100   30


 


6/3/22 10:50  98  126/72    


 


6/3/22 10:00  99 22 130/74 (92) 100 Mechanical Ventilator 30.00 














I & O 


 


 22





 06:59


 


Intake Total 790 ml


 


Output Total 4125 ml


 


Balance -3335 ml








Height & Weight


Height: '"


Weight: lbs. oz. kg; 24.46 BMI


Method:


General Appearance:  No Apparent Distress


Neck:  Full Range of Motion, Normal Inspection, Non Tender


Respiratory:  Other (Coarse bilateral breath sounds without wheezing currently 

ventilating easily)


Cardiovascular:  Regular Rate, Rhythm, No Gallop, Other ( 2/6 systolic ejection 

murmur)


Capillary Refill:  Less Than 3 Seconds


Gastrointestinal:  non tender, soft


Extremity:  Other ( trace edema of upper and lower extremities without cyanosis 

or clubbing)


Neurologic/Psychiatric:  Alert





Results


Lab


Laboratory Tests


6/3/22 05:10








6/3/22 06:30








22 03:51











Assessment/Plan


Assessment/Plan


See free text.


Critical Care:  Ventilator Management











KATE AGUILAR MD           2022 10:04
Subjective


Date Seen by a Provider:  Ammon 3, 2022


Time Seen by a Provider:  09:52


Subjective/Events-last exam


(Tele-ICU Physician , Progress Note ) 





Available chart/ vitals / labs / Images reviewed 


Video assessment done using  teleICU camera, rest of exam as per RN 


Discussed with RN , EXAM AS PER RN 





Events overnight :   sent to medical floor this am 





Afebrile  


FiO2 - 35%


I/O = 





Drips:   BANANA BAG 


Sedation gtt: propf 35 , precedex 1.5     ( RASS   -2  ) 





VENT SETTINGS and ABG  reviewed 


Not candidate  for SBT today REVIEWED  Cardiovascular Stability / Sedation 

Score / FI02/PEEP / ABG / CXR  


Pressors: , hemodynamically stable 





Consultants:  perez





Hospital course: 


(5/30) 62y M with SOB, vever, cough and diarrhea. Reports chest tightness w/o 

pain. ADMIT DX: Acute COPD, Acute CHF, Axute resp failure with hypoxia, CXR 

shows opacities. on bipap


5/31 - to medical floor on NC , then back to ICU in severe resp distress after 

getting to bathroom , faled BIPAP , INTUBATED 


6/2 AC -30 + 5 


 





A/P 





Acute resp failure- 


- 5/30  bipap  with improvement 


5/31 - to medical floor on NC , then back to ICU in severe resp distress, faled 

BIPAP , INTUBATED  with flush pulm edema -  lasix given ,  CXR  STILL WITH 

INFILTRATES R>L - CONT ABX 


6/1 - fio2 35 % , decrease rr to 16 


ASTTEMPTS  TO WEAN OFF SEDATION - VERY AGITATED , NOT FOLLOW COMMANDS - WILL TRY

PRN FENTANYL AND ATIVAN  AND FOLLOW 








NSTEMI, HFpEF  ( EF 55% grII dst dsfnc, PRVC 33 mmHg ) moderate AS /AR


- as per cards 


- follow 





AECOPD 


- nebs , steroids IV  - DECREASE DOSE 


- empiric abx  to cont -  sputum cx - ususal km 





sUSPECTEED pna


- CXR  STILL WITH INFILTRATES R>L - CONT ABX 





Encephalopathy / agitation 


-= CT head 6/2


 ? ETOH - thiamine , folate 





 h/o CVA








Lines :  periph  (Central Line Necessity Reviewed) 


Pizano: +


OG: 


Nutrition: TF - TOLERATES 


Analgesia:  





Anxiety/ delirium  


VTE Prophylaxis:  lov 70 bid 


Stress Ulcer Prophylaxis:  ppi








Plans in collaboration with bedside consultants and IM MDs. 


Discussed with RN to reach out if any questions or concerns 


A total of  33 minutes of critical care time was devoted to this patient today, 

required to treat and/or prevent further deterioration of critical care 

condition ( as above) .





Sepsis Event


Evaluation


Height, Weight, BMI


Height: '"


Weight: lbs. oz. kg; 24.46 BMI


Method:





Exam


Exam


Patient acknowledged, consented, and participated in this virtual visit which 

was conducted using real time audio/video


Vital Signs








  Date Time  Temp Pulse Resp B/P (MAP) Pulse Ox O2 Delivery O2 Flow Rate FiO2


 


6/3/22 09:00  100 24 129/76 (93) 100 Mechanical Ventilator 30.00 


 


6/3/22 08:00  101 23 125/82 (96) 100 Mechanical Ventilator 30.00 


 


6/3/22 07:39        30


 


6/3/22 07:15  101 22  100   30


 


6/3/22 07:00  102 26 123/78 (93) 100 Mechanical Ventilator 30.00 


 


6/3/22 07:00  101      


 


6/3/22 06:00  103 24 129/72 (91) 100 Mechanical Ventilator 30.00 


 


6/3/22 05:13  105  138/77    


 


6/3/22 05:00  104 24 131/76 (94) 100 Mechanical Ventilator 30.00 


 


6/3/22 04:00     96 Mechanical Ventilator  30


 


6/3/22 04:00  105 24 138/77 (97) 100 Mechanical Ventilator 30.00 


 


6/3/22 03:39        30


 


6/3/22 03:00  106 22 135/78 (97) 100 Mechanical Ventilator 30.00 


 


6/3/22 02:41  107 26  100   30


 


6/3/22 02:00  106 26 131/75 (100) 100 Mechanical Ventilator 30.00 


 


6/3/22 01:54  103  125/74    


 


6/3/22 01:53 37.7       


 


6/3/22 01:00  105      


 


6/3/22 01:00  105 26 127/72 (102) 100 Mechanical Ventilator 30.00 


 


6/3/22 00:00 37.3       


 


6/3/22 00:00  103 26 125/74 (91) 99 Mechanical Ventilator 30.00 


 


6/2/22 23:59     97 Mechanical Ventilator  30


 


6/2/22 23:56  100  108/72    


 


6/2/22 23:39        30


 


6/2/22 23:00  100 26 108/72 (84) 100 Mechanical Ventilator 30.00 


 


6/2/22 22:26  99 24  95   30


 


6/2/22 22:00  102 18 118/74 (86) 99 Mechanical Ventilator 30.00 


 


6/2/22 21:00  104 17 135/76 (99) 97 Mechanical Ventilator 30.00 


 


6/2/22 20:12 37.0       


 


6/2/22 20:00     97 Mechanical Ventilator  30


 


6/2/22 20:00  105 19 137/67 (102) 98 Mechanical Ventilator 30.00 


 


6/2/22 19:39        30


 


6/2/22 19:27  101 23  98   30


 


6/2/22 19:00  101 17 131/78 (98) 97 Mechanical Ventilator 30.00 


 


6/2/22 19:00  101      


 


6/2/22 18:00  101 24 131/76 (94) 97 Mechanical Ventilator 30.00 


 


6/2/22 17:50  101  137/74    


 


6/2/22 17:49  101  137/74    


 


6/2/22 17:00  101 22 135/79 (97) 98 Mechanical Ventilator 30.00 


 


6/2/22 16:00     98 Mechanical Ventilator  30


 


6/2/22 16:00  104 24 137/79 (98) 98 Mechanical Ventilator 30.00 


 


6/2/22 15:32        25


 


6/2/22 15:24 37.2       


 


6/2/22 15:21  98 22  98   30


 


6/2/22 15:00  99 22 132/78 (96) 98 Mechanical Ventilator 30.00 


 


6/2/22 14:00  101 23 135/81 (99) 99 Mechanical Ventilator 30.00 


 


6/2/22 13:00  101 22 136/75 (95) 98 Mechanical Ventilator 30.00 


 


6/2/22 13:00  102      


 


6/2/22 12:00     99 Mechanical Ventilator  30


 


6/2/22 12:00 36.2       


 


6/2/22 12:00  105 20 142/79 (100) 98 Mechanical Ventilator 30.00 


 


6/2/22 11:40        25


 


6/2/22 11:17  109  141/83    


 


6/2/22 11:00  108 20 138/87 (104) 98 Mechanical Ventilator 30.00 


 


6/2/22 10:27  105 20  98   30


 


6/2/22 10:00  105 17 146/80 (102) 98 Mechanical Ventilator 30.00 














I & O 


 


 6/3/22





 07:00


 


Intake Total 2160 ml


 


Output Total 1075 ml


 


Balance 1085 ml








Height & Weight


Height: '"


Weight: lbs. oz. kg; 24.46 BMI


Method:


General Appearance:  No Apparent Distress, WD/WN, Chronically ill, Thin, Other 

(Sedated and intubated)


Neck:  Full Range of Motion, Normal Inspection, Non Tender


Respiratory:  Lungs Clear, Normal Breath Sounds


Cardiovascular:  Regular Rate, Rhythm


Capillary Refill:  Less Than 3 Seconds


Gastrointestinal:  non tender, soft


Extremity:  Normal Capillary Refill, Normal Inspection, Normal Range of Motion, 

Non Tender, No Calf Tenderness, No Pedal Edema


Neurologic/Psychiatric:  Alert





Results


Lab


Laboratory Tests


6/2/22 04:37








6/3/22 05:10








6/3/22 06:30











Assessment/Plan


Assessment/Plan


`











TON MOHAMUD MD          Ammon 3, 2022 09:52
Subjective


Date Seen by a Provider:  Jun 1, 2022


Time Seen by a Provider:  12:01


Subjective/Events-last exam


(Tele-ICU Physician , Progress Note ) 





Available chart/ vitals / labs / Images reviewed 


Video assessment done using  teleICU camera, rest of exam as per RN 


Discussed with RN , EXAM AS PER RN 





Events overnight :   sent to medical floor this am 





Afebrile  


FiO2 - 35%


I/O = 





Drips:  


Sedation gtt: propf 20 , precedex 1.5     ( RASS     ) 





VENT SETTINGS and ABG  reviewed 


Not candidate  for SBT today REVIEWED  Cardiovascular Stability / Sedation 

Score / FI02/PEEP / ABG / CXR  


Pressors: , hemodynamically stable 





Consultants:  perez





Hospital course: 


(5/30) 62y M with SOB, vever, cough and diarrhea. Reports chest tightness w/o 

pain. ADMIT DX: Acute COPD, Acute CHF, Axute resp failure with hypoxia, CXR 

shows opacities. on bipap


5/31 - to medical floor on [place] , then back to ICU in severe resp distress after 

getting to bathroom , faled BIPAP , INTUBATED 





 





A/P 





Acute resp failure- 


- 5/30  bipap  with improvement 


5/3-1 - to medical floor on NC , then back to ICU in severe resp distress, faled

BIPAP , INTUBATED  with flush pulm edema -  lasix given , ABX to cont 


6/1 fio2 35 % , decrease rr to 16 - follow 








NSTEMI, HFpEF  ( EF 55% grII dst dsfnc, PRVC 33 mmHg ) moderate AS /AR


- as per cards 


- follow 





AECOPD 


- nebs , steroids IV  , empiric abx 





 ? ETOH 


- thiamine , folate 





 h/o CVA





periph


Lines :    (Central Line Necessity Reviewed) 


Pizano: +


OG: 


Nutrition:   start TF 


Analgesia:  





Anxiety/ delirium  


VTE Prophylaxis:  lov 70 bid 


Stress Ulcer Prophylaxis:  ppi








Plans in collaboration with bedside consultants and IM MDs. 


Discussed with RN to reach out if any questions or concerns 


A total of  33 minutes of critical care time was devoted to this patient today, 

required to treat and/or prevent further deterioration of critical care 

condition ( as above) .





Sepsis Event


Evaluation


Height, Weight, BMI


Height: '"


Weight: lbs. oz. kg; 23.91 BMI


Method:





Focused Exam


Lactate Level


5/30/22 21:00: Lactic Acid Level 5.34*H


5/30/22 22:57: Lactic Acid Level 6.35*H


5/31/22 03:00: Lactic Acid Level 4.08*H





Exam


Exam


Patient acknowledged, consented, and participated in this virtual visit which 

was conducted using real time audio/video


Vital Signs








  Date Time  Temp Pulse Resp B/P (MAP) Pulse Ox O2 Delivery O2 Flow Rate FiO2


 


6/1/22 12:00 37.2       


 


6/1/22 11:59        35


 


6/1/22 11:00  112 24 133/68 (89) 95 Mechanical Ventilator 35.00 


 


6/1/22 10:32  105 30  99   35


 


6/1/22 10:00  107 23 144/79 (100) 98 Mechanical Ventilator 35.00 


 


6/1/22 09:00  108 23 143/81 (101) 98 Mechanical Ventilator 35.00 


 


6/1/22 08:00     96 Mechanical Ventilator  35


 


6/1/22 08:00  111 28 142/80 (100) 98 Mechanical Ventilator 35.00 


 


6/1/22 08:00 37.1       


 


6/1/22 07:25  105  138/76    


 


6/1/22 07:10  105 22  98   35


 


6/1/22 07:00  104      


 


6/1/22 07:00        35


 


6/1/22 07:00  105 24 138/76 (96) 98 Mechanical Ventilator 35.00 


 


6/1/22 06:00  105 25 133/76 (95) 97 Mechanical Ventilator 35.00 


 


6/1/22 05:00  101 25 127/79 (95) 98 Mechanical Ventilator 35.00 


 


6/1/22 04:00  104 26 131/74 (93) 98 Mechanical Ventilator 35.00 


 


6/1/22 03:30     96 Mechanical Ventilator  35


 


6/1/22 03:30        35


 


6/1/22 03:30 37.4 105 27 122/69 (86) 96 Mechanical Ventilator 35.00 


 


6/1/22 03:00  100 25 122/69 (86) 95 Mechanical Ventilator 35.00 


 


6/1/22 02:52      Mechanical Ventilator 35.00 


 


6/1/22 02:49  98 30  94   35


 


6/1/22 02:35  98  124/80    


 


6/1/22 02:00  99 24 122/78 (93) 98 Mechanical Ventilator 50.00 


 


6/1/22 01:00  98 23 121/71 (88) 98 Mechanical Ventilator 50.00 


 


6/1/22 01:00  98      


 


6/1/22 00:00  97 25 113/74 (87) 97 Mechanical Ventilator 50.00 


 


5/31/22 23:25     97 Mechanical Ventilator  50


 


5/31/22 23:05        50


 


5/31/22 23:05 37.1 93 20 111/73 (86) 97 Mechanical Ventilator 50.00 


 


5/31/22 23:00  92 25 113/73 (86) 97 Mechanical Ventilator 50.00 


 


5/31/22 22:45  89 25  97   50


 


5/31/22 22:45 37.2       


 


5/31/22 22:00      Mechanical Ventilator 50.00 


 


5/31/22 22:00  89 23 98/72 (81) 97 Mechanical Ventilator 50.00 


 


5/31/22 21:00  92 22 100/71 (81) 97 Mechanical Ventilator 60.00 


 


5/31/22 20:18        50


 


5/31/22 20:18      Mechanical Ventilator 60.00 


 


5/31/22 20:00  90 24 102/71 (81) 97 Mechanical Ventilator 60.00 


 


5/31/22 19:59 37.2       


 


5/31/22 19:42  83  80/59    


 


5/31/22 19:41  83  80/59    


 


5/31/22 19:40      Mechanical Ventilator 60.00 


 


5/31/22 19:30        60


 


5/31/22 19:27  83 25  95   60


 


5/31/22 19:15     95 Mechanical Ventilator  80


 


5/31/22 19:00  84      


 


5/31/22 19:00  84 20 63/47 (52) 97 Mechanical Ventilator 80.00 


 


5/31/22 18:30  92 17 81/57 (65) 94 Mechanical Ventilator  


 


5/31/22 18:15  116 15     


 


5/31/22 18:00  133 30 136/96 (109) 91   


 


5/31/22 17:45  149 30 134/124 (127) 99   


 


5/31/22 17:30  152 32 148/118 (128)    


 


5/31/22 17:19  136      


 


5/31/22 17:00     95 NIV Bilevel  90


 


5/31/22 15:27 37.2 118 34 137/86 (103) 94 NIV Bilevel 60.00 


 


5/31/22 14:58  120 29  95  60.00 


 


5/31/22 13:03  122 33  97  50.00 














I & O 


 


 6/1/22





 07:00


 


Intake Total 2410.2 ml


 


Output Total 1100 ml


 


Balance 1310.2 ml








Height & Weight


Height: '"


Weight: lbs. oz. kg; 23.91 BMI


Method:


General Appearance:  Anxious, Chronically ill, Mild Distress, Thin


Neck:  Full Range of Motion, Normal Inspection, Non Tender


Respiratory:  No Accessory Muscle Use, No Respiratory Distress, Crackles, 

Wheezing


Cardiovascular:  Regular Rate, Rhythm


Capillary Refill:  Less Than 3 Seconds


Gastrointestinal:  non tender, soft


Extremity:  Normal Capillary Refill, Normal Inspection, Normal Range of Motion, 

Non Tender, No Calf Tenderness, No Pedal Edema


Neurologic/Psychiatric:  Alert





Results


Lab


Laboratory Tests


5/30/22 16:46








5/31/22 03:00








5/31/22 18:35








6/1/22 04:22











Assessment/Plan


Assessment/Plan


1











TON MOHAMUD MD          Jun 1, 2022 12:02
Subjective


Date Seen by a Provider:  Jun 2, 2022


Time Seen by a Provider:  10:11


Subjective/Events-last exam


(Tele-ICU Physician , Progress Note ) 





Available chart/ vitals / labs / Images reviewed 


Video assessment done using  teleICU camera, rest of exam as per RN 


Discussed with RN , EXAM AS PER RN 





Events overnight :   sent to medical floor this am 





Afebrile  


FiO2 - 35%


I/O = 





Drips:  


Sedation gtt: propf 20 , precedex 1.5     ( RASS     ) 





VENT SETTINGS and ABG  reviewed 


Not candidate  for SBT today REVIEWED  Cardiovascular Stability / Sedation 

Score / FI02/PEEP / ABG / CXR  


Pressors: , hemodynamically stable 





Consultants:  perez





Hospital course: 


(5/30) 62y M with SOB, vever, cough and diarrhea. Reports chest tightness w/o 

pain. ADMIT DX: Acute COPD, Acute CHF, Axute resp failure with hypoxia, CXR 

shows opacities. on bipap


5/31 - to medical floor on NC , then back to ICU in severe resp distress after 

getting to bathroom , faled BIPAP , INTUBATED 


6/2 AC -30 + 5 


 





A/P 





Acute resp failure- 


- 5/30  bipap  with improvement 


5/31 - to medical floor on NC , then back to ICU in severe resp distress, faled 

BIPAP , INTUBATED  with flush pulm edema -  lasix given ,  CXR  STILL WITH INFI

LTRATES R>L - CONT ABX 


6/1 - fio2 35 % , decrease rr to 16 


ASTTEMPTS  TO WEAN OFF SEDATION - VERY AGITATED , NOT FOLLOW COMMANDS - WILL 

TRUY ADD PRN FEMTANYL AND FOLLOW 








NSTEMI, HFpEF  ( EF 55% grII dst dsfnc, PRVC 33 mmHg ) moderate AS /AR


- as per cards 


- follow 





AECOPD 


- nebs , steroids IV  , 


- empiric abx  to cont -  sputum cx - ususal km 








Encephalopathy / agitation 


-= CT head 6/2


 ? ETOH - thiamine , folate 





 h/o CVA








Lines :  periph  (Central Line Necessity Reviewed) 


Pizano: +


OG: 


Nutrition:   start TF 


Analgesia:  





Anxiety/ delirium  


VTE Prophylaxis:  lov 70 bid 


Stress Ulcer Prophylaxis:  ppi








Plans in collaboration with bedside consultants and IM MDs. 


Discussed with RN to reach out if any questions or concerns 


A total of  33 minutes of critical care time was devoted to this patient today, 

required to treat and/or prevent further deterioration of critical care 

condition ( as above) .





Sepsis Event


Evaluation


Height, Weight, BMI


Height: '"


Weight: lbs. oz. kg; 24.46 BMI


Method:





Focused Exam


Lactate Level


5/30/22 21:00: Lactic Acid Level 5.34*H


5/30/22 22:57: Lactic Acid Level 6.35*H


5/31/22 03:00: Lactic Acid Level 4.08*H





Exam


Exam


Patient acknowledged, consented, and participated in this virtual visit which 

was conducted using real time audio/video


Vital Signs








  Date Time  Temp Pulse Resp B/P (MAP) Pulse Ox O2 Delivery O2 Flow Rate FiO2


 


6/2/22 09:29  107  143/79    


 


6/2/22 09:00  108 18 144/83 (103) 98 Mechanical Ventilator 30.00 


 


6/2/22 08:00  110 17 149/83 (105) 98 Mechanical Ventilator 30.00 


 


6/2/22 07:40        25


 


6/2/22 07:33 36.0       


 


6/2/22 07:13  107 23  99   30


 


6/2/22 07:00  107 24 145/82 (103) 98 Mechanical Ventilator 30.00 


 


6/2/22 07:00  107      


 


6/2/22 06:00  110 23 141/82 (101) 97 Mechanical Ventilator 30.00 


 


6/2/22 05:00  112 20 149/86 (107) 96 Mechanical Ventilator 30.00 


 


6/2/22 04:00  114 27 148/84 (105) 95 Mechanical Ventilator 30.00 


 


6/2/22 03:35     96 Mechanical Ventilator  30


 


6/2/22 03:35 37.6     Mechanical Ventilator 30.00 


 


6/2/22 03:04  101  138/78    


 


6/2/22 03:00  105 24 146/81 (102) 96 Mechanical Ventilator 30.00 


 


6/2/22 03:00        30


 


6/2/22 02:53  101 18  94   30


 


6/2/22 02:00  101 18 137/80 (99) 94 Mechanical Ventilator 30.00 


 


6/2/22 01:00  102      


 


6/2/22 01:00  102 16 137/79 (98) 93 Mechanical Ventilator 30.00 


 


6/2/22 00:00  108 27 144/80 (101) 95 Mechanical Ventilator 30.00 


 


6/1/22 23:10     94 Mechanical Ventilator  30


 


6/1/22 23:05        30


 


6/1/22 23:00  108 24 147/82 (103) 95 Mechanical Ventilator 30.00 


 


6/1/22 23:00 36.3     Mechanical Ventilator 30.00 


 


6/1/22 22:46  101  143/80    


 


6/1/22 22:36  101 23  94   30


 


6/1/22 22:30      Mechanical Ventilator 30.00 


 


6/1/22 22:00  101 22 143/81 (101) 93 Mechanical Ventilator 25.00 


 


6/1/22 21:00  101 17 124/83 (97) 95 Mechanical Ventilator 25.00 


 


6/1/22 20:00  104 18 153/85 (107) 94 Mechanical Ventilator 25.00 


 


6/1/22 19:05     94 Mechanical Ventilator  25


 


6/1/22 19:00 37.0 107 24 141/86 (104) 96 Mechanical Ventilator 25.00 


 


6/1/22 19:00  108      


 


6/1/22 19:00        25


 


6/1/22 18:49  101 19  95   25


 


6/1/22 18:25  104  146/82    


 


6/1/22 18:00  102 17 146/82 (103) 96 Mechanical Ventilator 25.00 


 


6/1/22 17:00  104 18 148/83 (104) 96 Mechanical Ventilator 25.00 


 


6/1/22 16:00  107 18 144/81 (102)  Mechanical Ventilator 25.00 


 


6/1/22 16:00 37.9       


 


6/1/22 16:00     96 Mechanical Ventilator  25


 


6/1/22 15:40      Mechanical Ventilator 25.00 


 


6/1/22 15:39        25


 


6/1/22 15:00  112 24 142/82 (102) 95 Mechanical Ventilator 35.00 


 


6/1/22 14:12  110 32  98   35


 


6/1/22 14:00  112 33 141/81 (101) 96 Mechanical Ventilator 35.00 


 


6/1/22 13:00  109      


 


6/1/22 13:00  108 22 134/75 (94) 95 Mechanical Ventilator 35.00 


 


6/1/22 12:41  107  135/77    


 


6/1/22 12:00     96 Mechanical Ventilator  35


 


6/1/22 12:00 37.2       


 


6/1/22 12:00  108 18 141/72 (95) 95 Mechanical Ventilator 35.00 


 


6/1/22 11:59        35


 


6/1/22 11:00  112 24 133/68 (89) 95 Mechanical Ventilator 35.00 


 


6/1/22 10:32  105 30  99   35














I & O 


 


 6/2/22





 07:00


 


Intake Total 1660 ml


 


Output Total 1100 ml


 


Balance 560 ml








Height & Weight


Height: '"


Weight: lbs. oz. kg; 24.46 BMI


Method:


General Appearance:  No Apparent Distress, WD/WN, Chronically ill, Thin, Other 

(Sedated and intubated)


Neck:  Full Range of Motion, Normal Inspection, Non Tender


Respiratory:  No Accessory Muscle Use, No Respiratory Distress, Decreased Breath

Sounds


Cardiovascular:  Regular Rate, Rhythm


Capillary Refill:  Less Than 3 Seconds


Gastrointestinal:  non tender, soft


Extremity:  Normal Capillary Refill, Normal Inspection, Normal Range of Motion, 

Non Tender, No Calf Tenderness, No Pedal Edema


Neurologic/Psychiatric:  Alert





Results


Lab


Laboratory Tests


5/31/22 18:35








6/1/22 04:22








6/2/22 04:37











Assessment/Plan


Assessment/Plan


`











TON MOHAMUD MD          Jun 2, 2022 10:11
Subjective


Date Seen by a Provider:  Jun 6, 2022


Subjective/Events-last exam


(Tele-ICU Physician , Progress Note ) 





Available chart/ vitals / labs / Images reviewed 


Video assessment done using  teleICU camera, rest of exam as per RN 


Discussed with RN , EXAM AS PER RN 





Events overnight : 





Afebrile  


FiO2 - 3 l


I/O = 





Drips:  LR  100 , PRECEDEX 





Consultants:  perez





Hospital course: 


(5/30) 62y M with SOB, vever, cough and diarrhea. Reports chest tightness w/o 

pain. ADMIT DX: Acute COPD, Acute CHF, Axute resp failure with hypoxia, CXR 

shows opacities. on bipap


5/31 - to medical floor on NC , then back to ICU in severe resp distress after 

getting to bathroom , faled BIPAP , INTUBATED 


6/2 AC -30 + 5 


6/5 - EXTUBATED 


 





A/P 





Acute resp failure- 


- 5/30  bipap  with improvement 


5/31- INTUBATED , 6/5 - EXTUBATED , on 3 L 





NSTEMI, HFpEF  ( EF 55% grII dst dsfnc, PRVC 33 mmHg ) moderate AS /AR


- as per cards 


- follow 





AECOPD 


- nebs , steroids IV  - DECREASE DOSE today 


- empiric abx   complited -  sputum cx - ususal km 





sUSPECTEED pna


- CXR  STILL WITH INFILTRATES R>L -  complited abx  ABX 





Encephalopathy / agitation - RESOLVED , AAO x2  - still on Precedex , try to 

wean 


-= CT head 6/2


 = ETOH - thiamine , folate 





 h/o CVA








Lines :  periph  (Central Line Necessity Reviewed) 


Pizano: +


OG: 


Nutrition: start PO today id can swallow 


Analgesia:  





Anxiety/ delirium  


VTE Prophylaxis:  lov 70 bid 


Stress Ulcer Prophylaxis:  ppi








Plans in collaboration with bedside consultants and IM MDs. 


Discussed with RN to reach out if any questions or concerns 


A total of  33 minutes of critical care time was devoted to this patient today, 

required to treat and/or prevent further deterioration of critical care c

ondition ( as above) .





Sepsis Event


Evaluation


Height, Weight, BMI


Height: '"


Weight: lbs. oz. kg; 24.46 BMI


Method:





Exam


Exam


Patient acknowledged, consented, and participated in this virtual visit which 

was conducted using real time audio/video


Vital Signs








  Date Time  Temp Pulse Resp B/P (MAP) Pulse Ox O2 Delivery O2 Flow Rate FiO2


 


6/6/22 08:03     100 Nasal Cannula 1.00 


 


6/6/22 08:02     100 Nasal Cannula 2.00 


 


6/6/22 08:00  74 19 133/70 (91) 100 Nasal Cannula 2.00 


 


6/6/22 07:58  74      


 


6/6/22 07:00  73 18 129/70 (89) 100 Nasal Cannula 2.00 


 


6/6/22 06:33 36.3 73   100   


 


6/6/22 06:00  73 20 127/69 (88) 100 Nasal Cannula 2.00 


 


6/6/22 05:00  75 19 132/73 (92) 100 Nasal Cannula 2.00 


 


6/6/22 04:00     100 Nasal Cannula 2.00 


 


6/6/22 04:00  75 19 131/72 (90) 100 Nasal Cannula 2.00 


 


6/6/22 03:00  80 18 138/74 (88) 100 Nasal Cannula 2.00 


 


6/6/22 02:00  78 19 135/69 (81) 100 Nasal Cannula 2.00 


 


6/6/22 01:38     100 Nasal Cannula 2.00 


 


6/6/22 01:36 36.3       


 


6/6/22 01:00  76 20 139/72 (94) 100 Nasal Cannula 2.00 


 


6/6/22 01:00  76      


 


6/6/22 00:00     100 Nasal Cannula 2.00 


 


6/6/22 00:00  80 19 142/69 (93) 100 Nasal Cannula 2.00 


 


6/5/22 23:04     100 Nasal Cannula 2.00 


 


6/5/22 23:00  78 18 137/74 (95) 100 Nasal Cannula 2.00 


 


6/5/22 22:00  83 22 139/72 (99) 100 Nasal Cannula 5.00 


 


6/5/22 21:00  86 21 133/83 (108) 100 Nasal Cannula 5.00 


 


6/5/22 20:00  92 17 130/94 (101) 100 Nasal Cannula 5.00 


 


6/5/22 20:00     100 Nasal Cannula 3.00 


 


6/5/22 20:00 35.9       


 


6/5/22 19:30     100 Nasal Cannula 5.00 


 


6/5/22 19:00  90      


 


6/5/22 19:00  90 24 128/72 (81) 100 Nasal Cannula 5.00 


 


6/5/22 18:00  90 18 117/65 (82) 100 Nasal Cannula 5.00 


 


6/5/22 17:00  92 24 129/68 (88) 100 Nasal Cannula 5.00 


 


6/5/22 16:00 37.2       


 


6/5/22 16:00  93 15 130/68 (88) 100 Nasal Cannula 5.00 


 


6/5/22 16:00     100 Nasal Cannula 5.00 


 


6/5/22 15:12     100 Nasal Cannula 5.00 


 


6/5/22 14:00  90 18 127/76 (93) 100 Nasal Cannula 5.00 


 


6/5/22 13:00  89 21 124/61 (82) 100 Nasal Cannula 5.00 


 


6/5/22 12:49  91      


 


6/5/22 12:24  91  123/61    


 


6/5/22 12:23      Nasal Cannula 5.00 


 


6/5/22 12:00  91 18 123/67 (85) 100 Mechanical Ventilator 30.00 


 


6/5/22 12:00     100 Nasal Cannula 5.00 


 


6/5/22 11:11  98 19  100   30


 


6/5/22 11:00  93 16 102/56 (71) 96 Mechanical Ventilator 30.00 


 


6/5/22 10:00  88 19 116/71 (86) 100 Mechanical Ventilator 30.00 














I & O 


 


 6/6/22





 07:00


 


Intake Total 1985 ml


 


Output Total 875 ml


 


Balance 1110 ml








Height & Weight


Height: '"


Weight: lbs. oz. kg; 24.46 BMI


Method:


General Appearance:  No Apparent Distress


Neck:  Full Range of Motion, Normal Inspection, Non Tender


Respiratory:  Other (Coarse bilateral breath sounds without wheezing currently 

ventilating easily)


Cardiovascular:  Regular Rate, Rhythm, No Edema, No Gallop, No JVD, Normal 

Peripheral Pulses, Systolic Murmur (2/6 heard best at second intercostal space.)


Capillary Refill:  Less Than 3 Seconds


Gastrointestinal:  non tender, soft


Extremity:  Other ( trace edema of upper and lower extremities without cyanosis 

or clubbing)


Neurologic/Psychiatric:  Alert





Results


Lab


Laboratory Tests


6/5/22 00:43








6/6/22 04:45











Assessment/Plan


Assessment/Plan


TON LUNA MD          Jun 6, 2022 09:11
Subjective


Date Seen by a Provider:  May 31, 2022


Time Seen by a Provider:  17:44


Subjective/Events-last exam


 





  (Tele-ICU Physician , Progress Note ) 





Available chart/ vitals / labs / Images reviewed 


Video assessment done using  teleICU camera, rest of exam as per RN 


Discussed with RN , EXAM AS PER RN 





Events overnight :   sent to medical floor this am 





Afebrile  


FiO2 - 


I/O = 





Drips:  





Pressors: , hemodynamically stable 





Consultants:  perez





Hospital course: 


(5/30) 62y M with SOB, vever, cough and diarrhea. Reports chest tightness w/o 

pain. ADMIT DX: Acute COPD, Acute CHF, Axute resp failure with hypoxia, CXR 

shows opacities. on bipap


5/31 - to medical floor on NC , then back to ICU in severe resp distress after 

getting to bathroom 





 





A/P 





Acute resp failure


- needed bipap 5/30 with improvemnrnt 


5/31 - to medical floor on NC , then back to ICU in severe resp distress after 

getting to bathroom  ( PE is less likely with full dose lovenox 0


possible acute pulm edema   - will give lasix , check abg and cxr 


-cont tx AECOPD 


- placed on BIPAP 100% 12/5 , RR 40 - will add precedex and re-assess 








NSTEMI, HFpEF  ( EF 55% grII dst dsfnc, PRVC 33 mmHg ) moderate AS /AR


- as per cards 


- duresis , follow 





AECOPD 


- nebs , steroids IV  , empiric abx 





 h/o CVA- confused now , but moves  and fighting RN - seems no acute neuro 

deficit by RM exam 





periph


Lines :    (Central Line Necessity Reviewed) 


Pizano: +


OG: 


Nutrition:  


Analgesia:  





Anxiety/ delirium  


VTE Prophylaxis:  lov 70 bid 


Stress Ulcer Prophylaxis:  








Plans in collaboration with bedside consultants and IM MDs. 


Discussed with RN to reach out if any questions or concerns 


A total of  33 minutes of critical care time was devoted to this patient today, 

required to treat and/or prevent further deterioration of critical care 

condition ( as above) .





Sepsis Event


Evaluation


Height, Weight, BMI


Height: '"


Weight: lbs. oz. kg; 20.40 BMI


Method:





Focused Exam


Lactate Level


5/30/22 21:00: Lactic Acid Level 5.34*H


5/30/22 22:57: Lactic Acid Level 6.35*H


5/31/22 03:00: Lactic Acid Level 4.08*H





Exam


Exam


Patient acknowledged, consented, and participated in this virtual visit which 

was conducted using real time audio/video


Vital Signs








  Date Time  Temp Pulse Resp B/P (MAP) Pulse Ox O2 Delivery O2 Flow Rate FiO2


 


5/31/22 17:19  136      


 


5/31/22 17:00     95 NIV Bilevel  90


 


5/31/22 15:27 37.2 118 34 137/86 (103) 94 NIV Bilevel 60.00 


 


5/31/22 14:58  120 29  95  60.00 


 


5/31/22 13:03  122 33  97  50.00 


 


5/31/22 11:36 37.5 116 24 172/90 90 Nasal Cannula 6.00 


 


5/31/22 09:58     92 Nasal Cannula 4.00 


 


5/31/22 09:00  110 23 164/99 91 Nasal Cannula 2.00 


 


5/31/22 08:00  102 23 161/97 93 Nasal Cannula 2.00 


 


5/31/22 08:00     91 Nasal Cannula 2.00 


 


5/31/22 07:42     92 Nasal Cannula 3.50 


 


5/31/22 07:00  96 25 163/83 92 Nasal Cannula 2.00 


 


5/31/22 07:00  96      


 


5/31/22 06:00  96 23 163/92 90 Nasal Cannula 2.00 


 


5/31/22 05:16  96 31 166/88 93 Nasal Cannula 2.00 


 


5/31/22 04:03 36.2       


 


5/31/22 04:00  96 16 156/83 93 Nasal Cannula 2.00 


 


5/31/22 03:15     94 Nasal Cannula 2.00 


 


5/31/22 03:00  93 28 155/80 94 Nasal Cannula 2.00 


 


5/31/22 02:51      Nasal Cannula 2.00 


 


5/31/22 02:40     96 Room Air  


 


5/31/22 02:39      Room Air  


 


5/31/22 02:20  87 21  98  24.00 


 


5/31/22 02:00  94 25 172/88 97 NIV Bilevel 24.00 


 


5/31/22 01:00  89 21 154/88 97 NIV Bilevel 24.00 


 


5/31/22 01:00  89      


 


5/31/22 00:01 36.4       


 


5/31/22 00:00  87 25 135/102 97 NIV Bilevel 24.00 


 


5/30/22 23:25     98 NIV Bilevel  24


 


5/30/22 23:00  96 23 130/86 99 NIV Bilevel 24.00 


 


5/30/22 22:30  92 28 129/83 98 NIV Bilevel 24.00 


 


5/30/22 22:11      NIV Bilevel 24.00 


 


5/30/22 22:08  91 24  98  24.00 


 


5/30/22 22:00  99 20 160/93 98 NIV Bilevel 28.00 


 


5/30/22 21:40      NIV Bilevel 28.00 


 


5/30/22 21:30  82 28 134/85 91 NIV Bilevel 21.00 


 


5/30/22 21:06  86 25 145/84 92 NIV Bilevel 21.00 


 


5/30/22 20:45  87 25 133/78 91 NIV Bilevel 21.00 


 


5/30/22 20:30  92 28 155/88 93 NIV Bilevel 21.00 


 


5/30/22 20:19      NIV Bilevel 21.00 


 


5/30/22 20:15  90      


 


5/30/22 20:15  90 25 154/88 100 NIV Bilevel 60.00 


 


5/30/22 20:12 35.4 126 27 156/86 100 NIV Bilevel 60.00 


 


5/30/22 20:10  92 28  93  21.00 


 


5/30/22 20:08      NIV Bilevel  60


 


5/30/22 19:29  85 22 148/79 100 NIV Bilevel  














I & O 


 


 5/31/22





 07:00


 


Intake Total 2405 ml


 


Output Total 1625 ml


 


Balance 780 ml








Height & Weight


Height: '"


Weight: lbs. oz. kg; 20.40 BMI


Method:


General Appearance:  Severe Distress


Capillary Refill:  Less Than 3 Seconds


Gastrointestinal:  non tender, soft





Results


Lab


Laboratory Tests


5/30/22 16:46








5/31/22 03:00











Assessment/Plan


Assessment/Plan


`











TON MOHAMUD MD         May 31, 2022 17:45
141

## 2022-06-06 NOTE — CARDIOLOGY PROGRESS NOTE
Subjective


Date Seen by Provider:  Jun 6, 2022


Time Seen by Provider:  09:51


Subjective/Events-last exam


Patient was seen at bedside, laying down comfortably


Denied any chest pain or shortness of breath.


Still slightly confused, answering questions by yes or no


Review of Systems


General:  No Chills, No Night Sweats; Fatigue, Malaise; No Appetite, No Other


HEENT:  No Head Aches, No Visual Changes, No Eye Pain, No Ear Pain, No 

Dysphasia, No Sinus Congestion, No Post Nasal Drip, No Sore Throat, No Other


Pulmonary:  No Dyspnea, No Cough, No Pleuritic Chest Pain, No Other


Cardiovascular:  No: Chest Pain, Palpitations, Orthopnea, Paroxysmal Noc. 

Dyspnea, Edema, Lt Headedness, Other





Objective-Cardiology


Exam


Last Set of Vital Signs





Vital Signs








 6/5/22 6/6/22 6/6/22 6/6/22





 11:11 06:33 09:00 10:40


 


Temp  36.3  


 


Pulse   79 


 


Resp   17 


 


B/P (MAP)   124/67 (86) 


 


Pulse Ox   99 


 


O2 Delivery    Nasal Cannula


 


O2 Flow Rate    1.00


 


FiO2 30   








I&O











Intake and Output 


 


 6/6/22





 00:00


 


Intake Total 860 ml


 


Output Total 900 ml


 


Balance -40 ml


 


 


 


Intake Oral 260 ml


 


IV Total 400 ml


 


Other 200 ml


 


Output Urine Total 900 ml


 


# Bowel Movements 3








General:  Alert, Cooperative, No Acute Distress


HEENT:  Atraumatic


Neck:  Supple


Lungs:  Clear to Auscultation, Normal Air Movement


Heart:  Regular Rate, Normal S1, Normal S2


Abdomen:  Normal Bowel Sounds


Extremities:  No Clubbing


Skin:  No Rashes, No Breakdown


Neuro:  Normal Speech, Sensation Intact


Psych/Mental Status:  Mood NL





Results


Lab


Laboratory Tests


6/6/22 04:45














A/P-Cardiology


Admission Diagnosis


Acute respiratory failure


Acute exacerbation of COPD


Non-ST elevation myocardial infarction, type II MI


Aortic valve stenosis





Assessment/Plan


Status post acute respiratory failure, ventilator dependent.


Acute exacerbation of COPD with pneumonia.


Extubated on June 5, 2022.


Feeling better and reporting improvement.





Coronary artery disease, non-ST elevation myocardial infarction, probably type 

II myocardial infarction secondary to severe hypoxemia and respiratory failure.


Underlying coronary artery disease cannot be entirely excluded.


There is a history of coronary artery disease and stenting in the remote past.


I will proceed with cardiac catheterization today.  Discussed with the patient 

and we obtained a consent from his daughter who is a nurse





Congestive heart failure, acute left ventricular diastolic dysfunction, pre

served systolic function.  Continue to monitor





Aortic valve stenosis, aortic regurgitation.


2D echo was reported by Dr. Pereira as ejection fraction 55%, grade 2 diastolic 

dysfunction, moderate aortic valve stenosis with peak gradient 58 mmHg, mean 

gradient 30 mmHg, valve area 1.1 cm, moderate aortic regurgitation, moderate 

tricuspid regurgitation, estimated pulmonary artery pressure around 40 mmHg.





Hypertension, continue to monitor blood pressure





Hyperlipidemia, monitor lipids.





History of CVA with right hemiparesis.  Has been maintained on aspirin as an 

outpatient





Tobaccoism, still an active smoker.











SARAH BURDICK MD               Jun 6, 2022 09:53

## 2022-06-06 NOTE — DIAGNOSTIC IMAGING REPORT
INDICATION: Respiratory failure.



AP view of the chest is obtained with comparison made study one

day earlier. Pulmonary venous congestion with mixed interstitial

and alveolar densities, greater on the right are not appreciably

changed. There may also be mild right pleural fluid. Endotracheal

tube has been discontinued without evidence of pneumothorax.



IMPRESSION: Persistent bilateral pulmonary opacity which may be

due to pulmonary edema or pneumonitis. May be small amount right

pleural fluid, however no other change is seen post extubation.



Dictated by: 



  Dictated on workstation # [id number]

## 2022-06-06 NOTE — CARDIAC CATH REPORT
Cardiac Cath Report


Physician (s)/Assistant (s)


Physician


SARAH BURDICK MD





Pre-Procedure Diagnosis


Pre-Procedure Diagnosis:  Non-ST elevation myocardial infarction





Post-Procedure Note


Procedure Start Date:  Jun 6, 2022


Name of Procedure:  


Coronary angiogram


Stenting to the LAD


Findings/Procedure Note


PROCEDURE NOTE:


62-year-old gentleman with history of CVA, coronary artery disease, admitted 

with acute respiratory failure, had EKG changes and elevation in troponin level.

 Intubated on June 5, 2022, I visited with him and we discussed proceeding with 

cardiac catheterization.


After explaining the procedure to the patient, all pros and cons were explained,

all questions were answered.  The patient signed the consent and then he  was 

placed on the cardiac catheterization laboratory. Groin was prepped SL fashion 

local anesthesia was used. Sheath placed in the right femoral artery. Gertrude 

right and left catheter were used to access the coronary system. Pigtail was 

used in an attempt to cross the aortic valve to the left ventricular cavity 

without success.





Patient has severe stenosis, 80% lesion in the mid LAD heavily calcified lesion.

 He was given 5000 units of heparin, EBU 4 guide was advanced and selectively 

engaged the left anterior descending artery, BMW wire was advanced and parked 

distally.  I proceeded with predilatation with 3 x 20 balloon then I deployed 

alma point stent 3 x 23 mm under 15 kenisha up to 3.1 mm with excellent results.


At the end of the procedure the sheath was removed. Closure device was used





FINDINGS:





Hemodynamics 


LV was not measured, did not cross the aortic valve


Aorta 127/51 mean of 80





ANATOMY:


Left Main is almost absent, separate ostium to the LAD and circumflex artery


Left Anterior Descending ostial calcification with mild disease, mid LAD has 80%

calcified stenosis with successful balloon angioplasty then stenting using alma 

point stent 3 x 23 mm expanded to 3.1 mm.


Left Circumflex is calcified artery with mild disease nonobstructive disease


Right Coronary Artery is calcified artery, dominant artery with mild disease 

nonobstructive disease





CONCLUSION:


1.  Heavily calcified coronary system with 80% stenosis in the mid LAD 

successful balloon angioplasty then deployment of alma point stent 3 x 23 mm 

expanded to 3.1 mm with excellent results


2.  Separate ostium of the LAD and circumflex artery, calcification at the 

ostium with mild to moderate disease nonobstructive disease


3.  Calcified coronary system with mild disease in the mid circumflex artery and

the dominant right coronary artery, nonobstructive disease





DISCUSSION AND RECOMMENDATION:


Patient was loaded with aspirin and Plavix.  We will continue maximizing medical

therapy


Anesthesia Type:  Conscious Sedation


Estimated blood loss (mL):  25 ml


Contrast Amount:  94 ml


Total Radiation Dose:  984 mGy





Post-Procedure Diagnosis


Post-operative diagnosis:  


Non-ST elevation myocardial infarction


Coronary artery disease


Hypertension


Aortic valve stenosis











SARAH BURDICK MD               Jun 6, 2022 10:59

## 2022-06-06 NOTE — OCC THERAPY PROGRESS NOTE
Therapy Progress Note


OT attempted to see patient for evaluation. Pt currently out of room for cardiac

catheterization. Pt will be on bed rest post procedure. OT to try again 6/7/22 

if pt is medically appropriate.











Akosua Lynn OT                 Jun 6, 2022 11:30

## 2022-06-06 NOTE — PROGRESS NOTE
Subjective


Subjective/Events-last exam


Patient feeling much better this AM. He is nervous about going to the cath lab 

this AM. Denies any pain at this time. NPO for procedure


Review of Systems


General:  Fatigue


Pulmonary:  Dyspnea


Neurological:  Weakness, Incoordination





Objective


Exam


Last Set of Vital Signs





Vital Signs








  Date Time  Temp Pulse Resp B/P (MAP) Pulse Ox O2 Delivery O2 Flow Rate FiO2


 


6/6/22 20:57     100 Nasal Cannula 2.00 


 


6/6/22 19:36 36.8       


 


6/6/22 18:00  92 7 140/72 (94)    


 


6/5/22 11:11        30





Capillary Refill : Greater Than 3 Seconds


I&O











Intake and Output 


 


 6/5/22





 23:59


 


Intake Total 860 ml


 


Output Total 900 ml


 


Balance -40 ml


 


 


 


Intake Oral 260 ml


 


IV Total 400 ml


 


Other 200 ml


 


Output Urine Total 900 ml


 


# Bowel Movements 3








General:  Alert, Oriented X3, No Acute Distress


Lungs:  Clear to Auscultation, Normal Air Movement


Heart:  Regular Rate, Other (systolic murmur present)


Abdomen:  Normal Bowel Sounds, Soft, No Tenderness, No Masses


Extremities:  No Tenderness/Swelling


Neuro:  Normal Speech





Results/Procedures


Lab


Laboratory Tests


6/6/22 01:31: Glucometer 141H


6/6/22 04:30: Glucometer 120H


6/6/22 04:45: 


White Blood Count 10.5, Red Blood Count 4.18L, Hemoglobin 13.7, Hematocrit 41, 

Mean Corpuscular Volume 99, Mean Corpuscular Hemoglobin 33, Mean Corpuscular 

Hemoglobin Concent 33, Red Cell Distribution Width 13.8, Platelet Count 149, 

Mean Platelet Volume 10.9, Immature Granulocyte % (Auto) 1, Neutrophils (%) 

(Auto) 86H, Lymphocytes (%) (Auto) 7L, Monocytes (%) (Auto) 7, Eosinophils (%) 

(Auto) 0, Basophils (%) (Auto) 0, Neutrophils # (Auto) 9.0H, Lymphocytes # 

(Auto) 0.7L, Monocytes # (Auto) 0.7, Eosinophils # (Auto) 0.0, Basophils # 

(Auto) 0.0, Immature Granulocyte # (Auto) 0.1, Sodium Level 139, Potassium Level

4.4, Chloride Level 108H, Carbon Dioxide Level 21, Anion Gap 10, Blood Urea 

Nitrogen 16, Creatinine 0.59L, Estimat Glomerular Filtration Rate 110, 

BUN/Creatinine Ratio 27, Glucose Level 125H, Calcium Level 7.6L, Corrected 

Calcium 8.4L, Magnesium Level 2.4, Total Bilirubin 0.9, Aspartate Amino Transf 

(AST/SGOT) 28, Alanine Aminotransferase (ALT/SGPT) 45, Alkaline Phosphatase 33L,

Total Protein 5.4L, Albumin 3.0L


6/6/22 11:49: Glucometer 99


6/6/22 16:18: Glucometer 128H


6/6/22 22:00: Glucometer 130H





Microbiology


5/31/22 Gram Stain - Final, Complete


          


5/31/22 Sputum Culture - Final, Complete


          Usual upper respiratory km


5/30/22 Blood Culture - Final, Complete


          No growth





Assessment/Plan


Assessment/Plan





(1) Acute respiratory failure with hypoxia


Status:  Acute


Assessment & Plan:  6/6: Extubated over the weekend, doing well on NC, MAT 

protocol





(2) Acute exacerbation of chronic obstructive pulmonary disease (COPD)


Status:  Acute


(3) Non-ST elevation myocardial infarction (NSTEMI), initial care episode


Status:  Acute


Assessment & Plan:  6/6: Patient to cath lab today by Dr Posey





(4) Mixed hyperlipidemia


Status:  Chronic


(5) Primary hypertension


Status:  Chronic


(6) History of cerebrovascular accident


Status:  Chronic


Assessment & Plan:  - With Right hemiparesis





(7) Nonrheumatic aortic valve stenosis with regurgitation


Status:  Chronic











MELL SHAIKH MD                Jun 6, 2022 22:31

## 2022-06-07 VITALS — SYSTOLIC BLOOD PRESSURE: 150 MMHG | DIASTOLIC BLOOD PRESSURE: 80 MMHG

## 2022-06-07 VITALS — DIASTOLIC BLOOD PRESSURE: 87 MMHG | SYSTOLIC BLOOD PRESSURE: 154 MMHG

## 2022-06-07 VITALS — SYSTOLIC BLOOD PRESSURE: 162 MMHG | DIASTOLIC BLOOD PRESSURE: 79 MMHG

## 2022-06-07 VITALS — DIASTOLIC BLOOD PRESSURE: 80 MMHG | SYSTOLIC BLOOD PRESSURE: 150 MMHG

## 2022-06-07 VITALS — DIASTOLIC BLOOD PRESSURE: 88 MMHG | SYSTOLIC BLOOD PRESSURE: 115 MMHG

## 2022-06-07 VITALS — DIASTOLIC BLOOD PRESSURE: 65 MMHG | SYSTOLIC BLOOD PRESSURE: 145 MMHG

## 2022-06-07 VITALS — SYSTOLIC BLOOD PRESSURE: 158 MMHG | DIASTOLIC BLOOD PRESSURE: 83 MMHG

## 2022-06-07 VITALS — SYSTOLIC BLOOD PRESSURE: 144 MMHG | DIASTOLIC BLOOD PRESSURE: 78 MMHG

## 2022-06-07 VITALS — DIASTOLIC BLOOD PRESSURE: 76 MMHG | SYSTOLIC BLOOD PRESSURE: 156 MMHG

## 2022-06-07 VITALS — DIASTOLIC BLOOD PRESSURE: 104 MMHG | SYSTOLIC BLOOD PRESSURE: 129 MMHG

## 2022-06-07 VITALS — DIASTOLIC BLOOD PRESSURE: 109 MMHG | SYSTOLIC BLOOD PRESSURE: 150 MMHG

## 2022-06-07 VITALS — SYSTOLIC BLOOD PRESSURE: 145 MMHG | DIASTOLIC BLOOD PRESSURE: 113 MMHG

## 2022-06-07 VITALS — DIASTOLIC BLOOD PRESSURE: 84 MMHG | SYSTOLIC BLOOD PRESSURE: 159 MMHG

## 2022-06-07 VITALS — SYSTOLIC BLOOD PRESSURE: 143 MMHG | DIASTOLIC BLOOD PRESSURE: 60 MMHG

## 2022-06-07 LAB
ALBUMIN SERPL-MCNC: 3 GM/DL (ref 3.2–4.5)
ALP SERPL-CCNC: 37 U/L (ref 40–136)
ALT SERPL-CCNC: 39 U/L (ref 0–55)
ARTERIAL PATENCY WRIST A: (no result)
BASE EXCESS STD BLDA CALC-SCNC: 0.5 MMOL/L (ref -2.5–2.5)
BASOPHILS # BLD AUTO: 0 10^3/UL (ref 0–0.1)
BASOPHILS NFR BLD AUTO: 0 % (ref 0–10)
BDY SITE: (no result)
BILIRUB SERPL-MCNC: 0.9 MG/DL (ref 0.1–1)
BODY TEMPERATURE: 36.9
BUN/CREAT SERPL: 27
CALCIUM SERPL-MCNC: 7.9 MG/DL (ref 8.5–10.1)
CHLORIDE SERPL-SCNC: 109 MMOL/L (ref 98–107)
CO2 BLDA CALC-SCNC: 25 MMOL/L (ref 21–31)
CO2 SERPL-SCNC: 21 MMOL/L (ref 21–32)
CREAT SERPL-MCNC: 0.55 MG/DL (ref 0.6–1.3)
EOSINOPHIL # BLD AUTO: 0 10^3/UL (ref 0–0.3)
EOSINOPHIL NFR BLD AUTO: 0 % (ref 0–10)
GFR SERPLBLD BASED ON 1.73 SQ M-ARVRAT: 113 ML/MIN
GLUCOSE SERPL-MCNC: 109 MG/DL (ref 70–105)
HCT VFR BLD CALC: 40 % (ref 40–54)
HGB BLD-MCNC: 13.6 G/DL (ref 13.3–17.7)
INHALED O2 FLOW RATE: (no result) L/MIN
LYMPHOCYTES # BLD AUTO: 0.7 10^3/UL (ref 1–4)
LYMPHOCYTES NFR BLD AUTO: 5 % (ref 12–44)
MAGNESIUM SERPL-MCNC: 2.1 MG/DL (ref 1.6–2.4)
MANUAL DIFFERENTIAL PERFORMED BLD QL: NO
MCH RBC QN AUTO: 33 PG (ref 25–34)
MCHC RBC AUTO-ENTMCNC: 34 G/DL (ref 32–36)
MCV RBC AUTO: 98 FL (ref 80–99)
MONOCYTES # BLD AUTO: 1.1 10^3/UL (ref 0–1)
MONOCYTES NFR BLD AUTO: 8 % (ref 0–12)
NEUTROPHILS # BLD AUTO: 12 10^3/UL (ref 1.8–7.8)
NEUTROPHILS NFR BLD AUTO: 85 % (ref 42–75)
PCO2 BLDA: 34 MMHG (ref 35–45)
PH BLDA: 7.46 [PH] (ref 7.37–7.43)
PLATELET # BLD: 185 10^3/UL (ref 130–400)
PMV BLD AUTO: 10.7 FL (ref 9–12.2)
PO2 BLDA: 68 MMHG (ref 79–93)
POTASSIUM SERPL-SCNC: 3.9 MMOL/L (ref 3.6–5)
PROT SERPL-MCNC: 5.3 GM/DL (ref 6.4–8.2)
SAO2 % BLDA FROM PO2: 94 % (ref 94–100)
SODIUM SERPL-SCNC: 140 MMOL/L (ref 135–145)
VENTILATION MODE VENT: NO
WBC # BLD AUTO: 14.1 10^3/UL (ref 4.3–11)

## 2022-06-07 RX ADMIN — DOCUSATE SODIUM SCH MG: 100 CAPSULE ORAL at 20:28

## 2022-06-07 RX ADMIN — IPRATROPIUM BROMIDE AND ALBUTEROL SULFATE SCH ML: .5; 3 SOLUTION RESPIRATORY (INHALATION) at 09:36

## 2022-06-07 RX ADMIN — MAGNESIUM SULFATE IN DEXTROSE SCH MLS/HR: 10 INJECTION, SOLUTION INTRAVENOUS at 06:04

## 2022-06-07 RX ADMIN — LORAZEPAM PRN MG: 1 TABLET ORAL at 02:06

## 2022-06-07 RX ADMIN — INSULIN ASPART SCH UNIT: 100 INJECTION, SOLUTION INTRAVENOUS; SUBCUTANEOUS at 20:25

## 2022-06-07 RX ADMIN — PANTOPRAZOLE SODIUM SCH MG: 40 TABLET, DELAYED RELEASE ORAL at 12:10

## 2022-06-07 RX ADMIN — CLOPIDOGREL BISULFATE SCH MG: 75 TABLET, FILM COATED ORAL at 07:55

## 2022-06-07 RX ADMIN — INSULIN ASPART SCH UNIT: 100 INJECTION, SOLUTION INTRAVENOUS; SUBCUTANEOUS at 12:10

## 2022-06-07 RX ADMIN — LISINOPRIL SCH MG: 10 TABLET ORAL at 07:56

## 2022-06-07 RX ADMIN — ASPIRIN 81 MG CHEWABLE TABLET SCH MG: 81 TABLET CHEWABLE at 07:55

## 2022-06-07 RX ADMIN — ENOXAPARIN SODIUM SCH MG: 100 INJECTION SUBCUTANEOUS at 20:28

## 2022-06-07 RX ADMIN — Medication SCH MG: at 06:39

## 2022-06-07 RX ADMIN — SODIUM CHLORIDE, SODIUM LACTATE, POTASSIUM CHLORIDE, AND CALCIUM CHLORIDE SCH MLS/HR: 600; 310; 30; 20 INJECTION, SOLUTION INTRAVENOUS at 04:50

## 2022-06-07 RX ADMIN — METOPROLOL SUCCINATE SCH MG: 50 TABLET, EXTENDED RELEASE ORAL at 07:55

## 2022-06-07 RX ADMIN — DOCUSATE SODIUM SCH MG: 100 CAPSULE ORAL at 07:55

## 2022-06-07 RX ADMIN — SODIUM CHLORIDE SCH MLS/HR: 900 INJECTION, SOLUTION INTRAVENOUS at 17:39

## 2022-06-07 RX ADMIN — IPRATROPIUM BROMIDE AND ALBUTEROL SULFATE SCH ML: .5; 3 SOLUTION RESPIRATORY (INHALATION) at 21:08

## 2022-06-07 RX ADMIN — POTASSIUM CHLORIDE SCH MLS/HR: 200 INJECTION, SOLUTION INTRAVENOUS at 06:04

## 2022-06-07 RX ADMIN — IPRATROPIUM BROMIDE AND ALBUTEROL SULFATE SCH ML: .5; 3 SOLUTION RESPIRATORY (INHALATION) at 03:03

## 2022-06-07 RX ADMIN — METHYLPREDNISOLONE SODIUM SUCCINATE SCH MG: 125 INJECTION, POWDER, FOR SOLUTION INTRAMUSCULAR; INTRAVENOUS at 20:28

## 2022-06-07 RX ADMIN — FAMOTIDINE SCH MG: 10 INJECTION INTRAVENOUS at 07:56

## 2022-06-07 RX ADMIN — POTASSIUM CHLORIDE SCH MEQ: 1500 TABLET, EXTENDED RELEASE ORAL at 06:04

## 2022-06-07 RX ADMIN — ENOXAPARIN SODIUM SCH MG: 100 INJECTION SUBCUTANEOUS at 07:56

## 2022-06-07 RX ADMIN — ACETAMINOPHEN PRN MG: 325 TABLET ORAL at 12:11

## 2022-06-07 RX ADMIN — INSULIN ASPART SCH UNIT: 100 INJECTION, SOLUTION INTRAVENOUS; SUBCUTANEOUS at 06:04

## 2022-06-07 RX ADMIN — METHYLPREDNISOLONE SODIUM SUCCINATE SCH MG: 125 INJECTION, POWDER, FOR SOLUTION INTRAMUSCULAR; INTRAVENOUS at 07:56

## 2022-06-07 RX ADMIN — SODIUM CHLORIDE SCH MLS/HR: 900 INJECTION, SOLUTION INTRAVENOUS at 06:05

## 2022-06-07 RX ADMIN — IPRATROPIUM BROMIDE AND ALBUTEROL SULFATE SCH ML: .5; 3 SOLUTION RESPIRATORY (INHALATION) at 14:38

## 2022-06-07 RX ADMIN — INSULIN ASPART SCH UNIT: 100 INJECTION, SOLUTION INTRAVENOUS; SUBCUTANEOUS at 17:37

## 2022-06-07 NOTE — CARDIOLOGY PROGRESS NOTE
Subjective


Date Seen by Provider:  Jun 7, 2022


Time Seen by Provider:  08:27


Subjective/Events-last exam


Patient was seen at bedside, laying down comfortably, complaining of pain in his

left thigh.  No chest pain.


Review of Systems


General:  No Chills, No Night Sweats; Fatigue; No Malaise, No Appetite, No Other


HEENT:  No Head Aches, No Visual Changes, No Eye Pain, No Ear Pain, No 

Dysphasia, No Sinus Congestion, No Post Nasal Drip, No Sore Throat, No Other


Pulmonary:  No Dyspnea, No Cough, No Pleuritic Chest Pain, No Other


Cardiovascular:  No: Chest Pain, Palpitations, Orthopnea, Paroxysmal Noc. 

Dyspnea, Edema, Lt Headedness, Other





Objective-Cardiology


Exam


Last Set of Vital Signs





Vital Signs








 6/5/22 6/7/22 6/7/22





 11:11 03:52 08:14


 


Temp  36.9 


 


Pulse Ox   97


 


O2 Delivery   Nasal Cannula


 


O2 Flow Rate   2.00


 


FiO2 30  








I&O











Intake and Output 


 


 6/7/22





 00:00


 


Intake Total 2400 ml


 


Output Total 1625 ml


 


Balance 775 ml


 


 


 


Intake Oral 1300 ml


 


IV Total 1100 ml


 


Output Urine Total 1625 ml








General:  Alert, Oriented X3, No Acute Distress


HEENT:  Atraumatic


Neck:  Supple


Lungs:  Clear to Auscultation, Normal Air Movement


Heart:  Regular Rate, Other (systolic murmur present)


Abdomen:  Normal Bowel Sounds, Soft, No Tenderness, No Masses


Extremities:  No Tenderness/Swelling


Skin:  No Rashes, No Breakdown


Neuro:  Normal Speech


Psych/Mental Status:  Mental Status NL, Mood NL





Results


Lab


Laboratory Tests


6/7/22 05:00














A/P-Cardiology


Admission Diagnosis


Acute respiratory failure


Acute exacerbation of COPD


Non-ST elevation myocardial infarction, type II MI


Aortic valve stenosis





Assessment/Plan


Status post acute respiratory failure, ventilator dependent.


Acute exacerbation of COPD with pneumonia.


Extubated on June 5, 2022.


Feeling better and reporting improvement.





Coronary artery disease, non-ST elevation myocardial infarction, probably type 

II myocardial infarction secondary to severe hypoxemia and respiratory failure.


Underlying coronary artery disease cannot be entirely excluded.


Cardiac catheterization was done on June 7, 2022, calcified coronary system with

severe stenosis in the mid LAD, status post stenting using alma point stent 3 x 

23 mm expanded to 3.1 mm with excellent results, started on aspirin and Plavix.





Left thigh pain, has been complaining of left leg pain, appears to have 

diminished pulse on the left leg.  No cyanosis was noted, I will evaluate a

rterial Doppler study





Congestive heart failure, acute left ventricular diastolic dysfunction, prese

rved systolic function.  Continue to monitor





Aortic valve stenosis, aortic regurgitation.


2D echo was reported by Dr. Pereira as ejection fraction 55%, grade 2 diastolic 

dysfunction, moderate aortic valve stenosis with peak gradient 58 mmHg, mean 

gradient 30 mmHg, valve area 1.1 cm, moderate aortic regurgitation, moderate 

tricuspid regurgitation, estimated pulmonary artery pressure around 40 mmHg.





Hypertension, continue to monitor blood pressure





Hyperlipidemia, monitor lipids.





History of CVA with right hemiparesis.  Has been maintained on aspirin as an 

outpatient





Tobaccoism, still an active smoker.











SARAH BURDICK MD               Jun 7, 2022 08:28

## 2022-06-07 NOTE — OCCUPATIONAL THERAPY EVAL
OT Evaluation-General/PLF


Medical Diagnosis


Admission Date


May 30, 2022 at 20:08


Medical Diagnosis:  COPD exacerbation


Onset Date:  May 30, 2022





Therapy Diagnosis


Therapy Diagnosis:  reduced adl status





Precautions


Precautions/Isolations:  Seizure, Fall Prevention, Standard Precautions, 

Pressure Ulcer





Referral


Physician:  Ervin Adame Reason:  Evaluation/Treatment





Medical History


Pertinent Medical History:  COPD, CVA


Current History


Pt presents to hospital with fever, cough, increased SOB and diarrhea. SpO2 in 

upper 60's. Placed on supplemental oxygen. Per patient, he lives with his wife 

in a multilevel home. He does not use any AD for mobility but his wife is always

close by. He requires assistance with dressing and bathing secondary to residual

effects (RUE contractures and hemiparesis) from old CVA. Pt reports he was able 

to feed self and toilet.


OT Re-evaluation after decline in medical status and transfer up to ICU.


Reviewed History:  Yes





Social History


Home:  Multilevel


Current Living Status:  Spouse





ADL-Prior Level of Function


SCALE: Activities may be completed with or without assistive devices.





6-Indepedent-patient completes the activity by him/herself with no assistance 

from a helper.


5-Set-up or Clean-up Assistance-helper sets up or cleans up; patient completes a

ctivity. [place] assists only prior to or  


    following the activity.


4-Supervision or Touching Assistance-helper provides verbal cues and/or 

touching/steadying and/or contact guard assistance as patient completes 

activity. Assistance may be provided   


    throughout the activity or intermittently.


3-Partial/Moderate Assistance-helper does LESS THAN HALF the effort. [place] 

lifts, holds or supports trunk or limbs, but provides less than half the effort.


2-Substantial/Maximal Assistance-helper does MORE THAN HALF the effort. [place] 

lifts or holds trunk or limbs and provides more than half the effort.


5-Jjfhwvaqq-ojmcit does ALL the effort. Patient does none of the effort to 

complete the activity. Or, the assistance of 2 or more helpers is required for 

the patient to complete the  


    activity.


If activity was not attempted, code reason:


7-Patient Refused.


9-Not Applicable-not attempted and the patient did not perform the activity 

before the current illness, exacerbation or injury.


10-Not Attempted due to Environmental Limitations-(lack of equipment, weather 

restraints, etc.).


88-Not Attempted due to Medical Conditions or Safety Concerns.


Self Care:  Needed Some Help


Functional Cognition:  Needed Some Help


DME/Equipment:  Bath Chair, Tub/Shower


Drive Self:  No





OT Current Status


Subjective


Limited conversation. Appears more confused than when evaluated on 5/31





Appearance


Pt returned to supine in bed, all needs within reach, RN notified





Mental Status/Objective


Patient Orientation:  Person, Confused


Attachments:  Pizano Catheter, IV, Oxygen, Telemetry





Current


Hearing Aids:  No


Hand Dominance:  Right


Upper Extremity ROM


R shoulder: AAROM ~120 degrees, Full elbow extension/flexion with extra time, 

increased tone notable with elbow extension. Contracted hand/wrist. Tremors 

notable in BUE's.  Pt had full range with LUE on 5/31. Currently, pt only able 

to actively flex shoulder to ~60 degrees.


Upper Extremity Strength


debilitated, LUE: 2/5





ADL-Treatment


On/Off Footwear (QC):  1


Toileting Hygiene (QC):  1


Significant decline notable from last eval on 5/31/22. Max a to sit EOB as pt 

required assist with transitioning feet off side of bed and elevating torso. 

Initially, pt needs mod a to maintain sitting balance, improves to SBA with time

and cues. Pt's skin damp/moist, asymptomatic. RN notified to check vitals/tem

perature. Max a to lift and scoot hips towards HOB. At this time, pt would be 

dependent to don bilateral socks secondary to poor dynamic sitting balance. Mod 

a to return to supine. Dependent x2 to supine scoot towards HOB. While supine, 

OT opened and washed R hand due to flexed contracture and foul smell coming from

hand. OT placed dry rolled towel in hand. Pt could benefit from resting hand 

splint.





Education


OT Patient Education:  Correct positioning, Purpose of tx/functional activities,

Safety issues, Transfer techniques


Teaching Recipient:  Patient


Teaching Methods:  Demonstration, Discussion


Response to Teaching:  Reinforcement Needed





OT Long Term Goals


Long Term Goals


Time Frame:  Ammon 10, 2022


Eating (QC):  5


Oral Hygiene (QC):  5


Toileting Hygiene (QC):  3


Shower/Bathe Self (QC):  3


Upper Body Dressing (QC):  2


Lower Body Dressing (QC):  3


On/Off Footwear (QC):  4


1=Demonstrate adherence to instructed precautions during ADL tasks.


2=Patient will verbalize/demonstrate understanding of assistive 

devices/modifications for ADL.


3=Patient will improve strength/tolerance for activity to enable patient to 

perform ADL's.





OT Education/Plan


Problem List/Assessment


Assessment:  Decreased Activ Tolerance, Decreased Safety Aware, Decreased UE 

Strength, Dependent Transfers, Edema (BUE's, Left worse than Right), Impaired 

Bed Mobility, Impaired Cognition, Impaired Coordination, Impaired Funct Balance,

Impaired Self-Care Skills, Restricted Funct UE ROM





Discharge Recommendations


Plan/Recommendations:  Continue POC


Therapy Discharge Recommendati:  Post Acute OT





Treatment Plan/Plan of Care


Treatment,Training & Education:  Yes


Patient would benefit from OT for education, treatment and training to promote 

independence in ADL's, mobility, safety and/or upper extremity function for 

ADL's.


Plan of Care:  ADL Retraining, Caregiver Training, Functional Mobility, Group 

Exercise/Act as Ind, UE Funct Exercise/Act, UE Neuromus Re-Ed/Coord


Treatment Duration:  Jun 28, 2022


Frequency:  3 times per week (3-5x/week)


Estimated Hrs Per Day:  .25 hour per day


Rehab Potential:  Poor





Time/GCodes


Start Time:  10:10


Stop Time:  10:36


Total Time Billed (hr/min):  26


Billed Treatment Time


1 visit


EVH (10 min)


FA (16 min)











Akosua Lynn OT                 Jun 7, 2022 10:53

## 2022-06-07 NOTE — PROGRESS NOTE
Subjective


Subjective/Events-last exam


Patient states that he is feeling better this AM. Denies any chest pain or 

shortness of breath. Tolerating PO diet.


Review of Systems


Pulmonary:  No Dyspnea, No Cough


Cardiovascular:  Edema; No: Chest Pain, Palpitations


Neurological:  Weakness, Incoordination





Objective


Exam


Last Set of Vital Signs





Vital Signs








  Date Time  Temp Pulse Resp B/P (MAP) Pulse Ox O2 Delivery O2 Flow Rate FiO2


 


6/7/22 15:37 36.2 94 20 159/84 (109) 96 Nasal Cannula 3.00 


 


6/5/22 11:11        30





Capillary Refill : Greater Than 3 Seconds


I&O











Intake and Output 


 


 6/7/22





 00:00


 


Intake Total 2400 ml


 


Output Total 1625 ml


 


Balance 775 ml


 


 


 


Intake Oral 1300 ml


 


IV Total 1100 ml


 


Output Urine Total 1625 ml








General:  Alert, Oriented X3, No Acute Distress


Lungs:  Clear to Auscultation, Normal Air Movement


Heart:  Regular Rate, No Murmurs


Abdomen:  Normal Bowel Sounds, Soft, No Tenderness, No Masses


Extremities:  Other (2+ pitting edema equal bilaterally)


Neuro:  Normal Speech, Other (Right hemiparesis)





Results/Procedures


Lab


Laboratory Tests


6/6/22 22:00: Glucometer 130H


6/7/22 04:43: 


Blood Gas Puncture Site LEFT RADIAL, Blood Gas Patient Temperature 36.9, 

Arterial Blood pH 7.46H, Arterial Blood Partial Pressure CO2 34L, Arterial Blood

Partial Pressure O2 68L, Arterial Blood HCO3 24, Arterial Blood Total CO2 25.0, 

Arterial Blood Oxygen Saturation 94, Arterial Blood Base Excess 0.5, [place] Test 

UNKNOWN, Blood Gas Ventilator Setting NO, Blood Gas Inspired Oxygen 2L NC


6/7/22 05:00: 


White Blood Count 14.1H, Red Blood Count 4.10L, Hemoglobin 13.6, Hematocrit 40, 

Mean Corpuscular Volume 98, Mean Corpuscular Hemoglobin 33, Mean Corpuscular 

Hemoglobin Concent 34, Red Cell Distribution Width 13.3, Platelet Count 185, 

Mean Platelet Volume 10.7, Immature Granulocyte % (Auto) 1, Neutrophils (%) 

(Auto) 85H, Lymphocytes (%) (Auto) 5L, Monocytes (%) (Auto) 8, Eosinophils (%) 

(Auto) 0, Basophils (%) (Auto) 0, Neutrophils # (Auto) 12.0H, Lymphocytes # 

(Auto) 0.7L, Monocytes # (Auto) 1.1H, Eosinophils # (Auto) 0.0, Basophils # 

(Auto) 0.0, Immature Granulocyte # (Auto) 0.2H, Sodium Level 140, Potassium 

Level 3.9, Chloride Level 109H, Carbon Dioxide Level 21, Anion Gap 10, Blood 

Urea Nitrogen 15, Creatinine 0.55L, Estimat Glomerular Filtration Rate 113, 

BUN/Creatinine Ratio 27, Glucose Level 109H, Calcium Level 7.9L, Corrected 

Calcium 8.7, Magnesium Level 2.1, Total Bilirubin 0.9, Aspartate Amino Transf 

(AST/SGOT) 28, Alanine Aminotransferase (ALT/SGPT) 39, Alkaline Phosphatase 37L,

Total Protein 5.3L, Albumin 3.0L


6/7/22 10:07: Glucometer 108


6/7/22 16:06: Glucometer 110





Microbiology


5/31/22 Gram Stain - Final, Complete


          


5/31/22 Sputum Culture - Final, Complete


          Usual upper respiratory km


5/30/22 Blood Culture - Final, Complete


          No growth





Assessment/Plan


Assessment/Plan





(1) Acute respiratory failure with hypoxia


Status:  Acute


Assessment & Plan:  6/6: Extubated over the weekend, doing well on NC, MAT 

protocol


6/7: NC 1-2, will continue to titrate as tolerated, no home oxygen requirement 

prior to admission





(2) Acute exacerbation of chronic obstructive pulmonary disease (COPD)


Status:  Acute


(3) Non-ST elevation myocardial infarction (NSTEMI), initial care episode


Status:  Acute


Assessment & Plan:  6/6: Patient to cath lab today by Dr Posey


6/7: S/p 2 stents, DAP therapy





(4) Mixed hyperlipidemia


Status:  Chronic


(5) Primary hypertension


Status:  Chronic


(6) History of cerebrovascular accident


Status:  Chronic


Assessment & Plan:  - With Right hemiparesis





(7) Nonrheumatic aortic valve stenosis with regurgitation


Status:  Chronic


(8) Physical debility


Status:  Acute


Assessment & Plan:  6/7: PT/IRF MELL Sarah MD                Jun 7, 2022 16:51

## 2022-06-07 NOTE — DIAGNOSTIC IMAGING REPORT
PROCEDURE: US Bilateral lower extremity arterial.



TECHNIQUE:   Multiple real-time grayscale images are obtained

through both lower extremity arterial systems with color Doppler

imaging and color Doppler spectral analysis.



INDICATION: Bilateral leg pain with diminished pulsatility.



Right leg: There is mildly diminished resistance with a biphasic

waveform throughout the right lower extremity femoropopliteal

system. There was however abrupt velocity acceleration and

deceleration at the level of the mid to distal right popliteal

artery. The velocities transition from 88 up to 251 cm/s and then

down to 71 cm/s in the proximal anterior tibial. This is

associated with a decreased resistance in a monophasic waveform

throughout the remaining right lower extremity arterial system

through the dorsalis pedis and posterior tibials.



Left leg: Calcified plaque results in hemodynamic significant

stenosis in the left common femoral artery where there is a

resultant monophasic waveform, turbulent blood flow and abrupt

velocity acceleration and deceleration which transition from 320

cm/s down to 48 cm/s at the proximal SFA and profunda. This is

presumed greater than 70% stenosis and hemo-dynamically

significant.



Beyond that level there is no focal velocity acceleration or

deceleration within the femoral vein in the thigh. The popliteal

or the major calf arteries. No segmental occlusion. A monophasic

waveform beyond that level persisted with diffusely slow

velocities. 



Impression: 

Findings suggest bilateral hemodynamic significant stenoses on

the left at the level of the common femoral and on the right at

the level of the popliteal artery. 



No segmental occlusion.



Dictated by: 



  Dictated on workstation # [id number]

## 2022-06-08 VITALS — SYSTOLIC BLOOD PRESSURE: 153 MMHG | DIASTOLIC BLOOD PRESSURE: 81 MMHG

## 2022-06-08 VITALS — DIASTOLIC BLOOD PRESSURE: 76 MMHG | SYSTOLIC BLOOD PRESSURE: 138 MMHG

## 2022-06-08 VITALS — SYSTOLIC BLOOD PRESSURE: 139 MMHG | DIASTOLIC BLOOD PRESSURE: 71 MMHG

## 2022-06-08 VITALS — SYSTOLIC BLOOD PRESSURE: 167 MMHG | DIASTOLIC BLOOD PRESSURE: 77 MMHG

## 2022-06-08 VITALS — DIASTOLIC BLOOD PRESSURE: 77 MMHG | SYSTOLIC BLOOD PRESSURE: 165 MMHG

## 2022-06-08 LAB
ALBUMIN SERPL-MCNC: 3 GM/DL (ref 3.2–4.5)
ALP SERPL-CCNC: 37 U/L (ref 40–136)
ALT SERPL-CCNC: 44 U/L (ref 0–55)
BASOPHILS # BLD AUTO: 0 10^3/UL (ref 0–0.1)
BASOPHILS NFR BLD AUTO: 0 % (ref 0–10)
BILIRUB SERPL-MCNC: 0.9 MG/DL (ref 0.1–1)
BUN/CREAT SERPL: 22
CALCIUM SERPL-MCNC: 7.7 MG/DL (ref 8.5–10.1)
CHLORIDE SERPL-SCNC: 107 MMOL/L (ref 98–107)
CO2 SERPL-SCNC: 21 MMOL/L (ref 21–32)
CREAT SERPL-MCNC: 0.6 MG/DL (ref 0.6–1.3)
EOSINOPHIL # BLD AUTO: 0 10^3/UL (ref 0–0.3)
EOSINOPHIL NFR BLD AUTO: 0 % (ref 0–10)
GFR SERPLBLD BASED ON 1.73 SQ M-ARVRAT: 110 ML/MIN
GLUCOSE SERPL-MCNC: 99 MG/DL (ref 70–105)
HCT VFR BLD CALC: 38 % (ref 40–54)
HGB BLD-MCNC: 12.7 G/DL (ref 13.3–17.7)
LYMPHOCYTES # BLD AUTO: 1.1 10^3/UL (ref 1–4)
LYMPHOCYTES NFR BLD AUTO: 8 % (ref 12–44)
MAGNESIUM SERPL-MCNC: 2 MG/DL (ref 1.6–2.4)
MANUAL DIFFERENTIAL PERFORMED BLD QL: NO
MCH RBC QN AUTO: 33 PG (ref 25–34)
MCHC RBC AUTO-ENTMCNC: 34 G/DL (ref 32–36)
MCV RBC AUTO: 97 FL (ref 80–99)
MONOCYTES # BLD AUTO: 1.3 10^3/UL (ref 0–1)
MONOCYTES NFR BLD AUTO: 9 % (ref 0–12)
NEUTROPHILS # BLD AUTO: 10.8 10^3/UL (ref 1.8–7.8)
NEUTROPHILS NFR BLD AUTO: 81 % (ref 42–75)
PLATELET # BLD: 183 10^3/UL (ref 130–400)
PMV BLD AUTO: 10.8 FL (ref 9–12.2)
POTASSIUM SERPL-SCNC: 3.5 MMOL/L (ref 3.6–5)
PROT SERPL-MCNC: 5.2 GM/DL (ref 6.4–8.2)
SODIUM SERPL-SCNC: 138 MMOL/L (ref 135–145)
WBC # BLD AUTO: 13.3 10^3/UL (ref 4.3–11)

## 2022-06-08 RX ADMIN — INSULIN ASPART SCH UNIT: 100 INJECTION, SOLUTION INTRAVENOUS; SUBCUTANEOUS at 20:17

## 2022-06-08 RX ADMIN — MAGNESIUM SULFATE IN DEXTROSE SCH MLS/HR: 10 INJECTION, SOLUTION INTRAVENOUS at 06:35

## 2022-06-08 RX ADMIN — INSULIN ASPART SCH UNIT: 100 INJECTION, SOLUTION INTRAVENOUS; SUBCUTANEOUS at 16:00

## 2022-06-08 RX ADMIN — IPRATROPIUM BROMIDE AND ALBUTEROL SULFATE SCH ML: .5; 3 SOLUTION RESPIRATORY (INHALATION) at 14:45

## 2022-06-08 RX ADMIN — SODIUM CHLORIDE SCH MLS/HR: 900 INJECTION, SOLUTION INTRAVENOUS at 03:50

## 2022-06-08 RX ADMIN — IPRATROPIUM BROMIDE AND ALBUTEROL SULFATE SCH ML: .5; 3 SOLUTION RESPIRATORY (INHALATION) at 01:56

## 2022-06-08 RX ADMIN — ASPIRIN 81 MG CHEWABLE TABLET SCH MG: 81 TABLET CHEWABLE at 09:29

## 2022-06-08 RX ADMIN — IPRATROPIUM BROMIDE AND ALBUTEROL SULFATE SCH ML: .5; 3 SOLUTION RESPIRATORY (INHALATION) at 21:41

## 2022-06-08 RX ADMIN — CLOPIDOGREL BISULFATE SCH MG: 75 TABLET, FILM COATED ORAL at 09:28

## 2022-06-08 RX ADMIN — DOCUSATE SODIUM SCH MG: 100 CAPSULE ORAL at 20:31

## 2022-06-08 RX ADMIN — METHYLPREDNISOLONE SODIUM SUCCINATE SCH MG: 125 INJECTION, POWDER, FOR SOLUTION INTRAMUSCULAR; INTRAVENOUS at 09:29

## 2022-06-08 RX ADMIN — INSULIN ASPART SCH UNIT: 100 INJECTION, SOLUTION INTRAVENOUS; SUBCUTANEOUS at 11:46

## 2022-06-08 RX ADMIN — INSULIN ASPART SCH UNIT: 100 INJECTION, SOLUTION INTRAVENOUS; SUBCUTANEOUS at 06:11

## 2022-06-08 RX ADMIN — SODIUM CHLORIDE SCH MLS/HR: 900 INJECTION, SOLUTION INTRAVENOUS at 03:39

## 2022-06-08 RX ADMIN — LISINOPRIL SCH MG: 10 TABLET ORAL at 09:29

## 2022-06-08 RX ADMIN — DOCUSATE SODIUM SCH MG: 100 CAPSULE ORAL at 09:29

## 2022-06-08 RX ADMIN — SODIUM CHLORIDE SCH MLS/HR: 900 INJECTION, SOLUTION INTRAVENOUS at 23:03

## 2022-06-08 RX ADMIN — PANTOPRAZOLE SODIUM SCH MG: 40 TABLET, DELAYED RELEASE ORAL at 09:28

## 2022-06-08 RX ADMIN — Medication SCH MG: at 06:40

## 2022-06-08 RX ADMIN — METOPROLOL SUCCINATE SCH MG: 50 TABLET, EXTENDED RELEASE ORAL at 09:29

## 2022-06-08 RX ADMIN — IPRATROPIUM BROMIDE AND ALBUTEROL SULFATE SCH ML: .5; 3 SOLUTION RESPIRATORY (INHALATION) at 07:17

## 2022-06-08 RX ADMIN — METHYLPREDNISOLONE SODIUM SUCCINATE SCH MG: 125 INJECTION, POWDER, FOR SOLUTION INTRAMUSCULAR; INTRAVENOUS at 20:31

## 2022-06-08 RX ADMIN — ENOXAPARIN SODIUM SCH MG: 100 INJECTION SUBCUTANEOUS at 09:28

## 2022-06-08 NOTE — OCCUPATIONAL THER DAILY NOTE
OT Current Status-Daily Note


Subjective


Pt denies pain, very slow processing speed.





Appearance


Pt left sitting upright in bed, all needs within reach, RN notified at end of 

treatment.





Mental Status/Objective


Patient Orientation:  Person


Attachments:  Pizano Catheter, IV, Telemetry





ADL-Treatment


Therapy Code Descriptions/Definitions 





Functional [place] Measure:


0=Not Assessed/NA        4=Minimal Assistance


1=Total Assistance        5=Supervision or Setup


2=Maximal Assistance  6=Modified [place]


3=Moderate Assistance 7=Complete IndependenceSCALE: Activities may be completed 

with or without assistive devices.





6-Indepedent-patient completes the activity by him/herself with no assistance 

from a helper.


5-Set-up or Clean-up Assistance-helper sets up or cleans up; patient completes 

activity. [place] assists only prior to or  


    following the activity.


4-Supervision or Touching Assistance-helper provides verbal cues and/or touching

/steadying and/or contact guard assistance as patient completes activity. 

Assistance may be provided   


    throughout the activity or intermittently.


3-Partial/Moderate Assistance-helper does LESS THAN HALF the effort. [place] 

lifts, holds or supports trunk or limbs, but provides less than half the effort.


2-Substantial/Maximal Assistance-helper does MORE THAN HALF the effort. [place] 

lifts or holds trunk or limbs and provides more than half the effort.


0-Tkywmgbhc-vlgiqk does ALL the effort. Patient does none of the effort to 

complete the activity. Or, the assistance of 2 or more helpers is required for 

the patient to complete the  


    activity.


If activity was not attempted, code reason:


7-Patient Refused.


9-Not Applicable-not attempted and the patient did not perform the activity 

before the current illness, exacerbation or injury.


10-Not Attempted due to Environmental Limitations-(lack of equipment, weather 

restraints, etc.).


88-Not Attempted due to Medical Conditions or Safety Concerns.





Other Treatment


Pt sitting upright in bed, continues to have rolled washcloth in R hand that OT 

left previous date. Very slow processing speed, appears to be confused. Requires

simplification with commands. UE A/AAROM exercises performed at bed level. R 

shoulder AAROM ~120 degrees, full elbow extension/flexion with extra time, 

increased tone notable with elbow extension. Contracted hand/wrist, PROM within 

available range. Tremors notable in L wrist extension only this date.  L 

shoulder: AAROM ~100 degrees. AROM L elbow-distally. 1 set, 10 reps all joints. 

Less swelling notable in BUE's this date. Education to position on pillows.





Education


OT Patient Education:  Correct positioning, Exercise program, Purpose of 

tx/functional activities, Safety issues


Teaching Recipient:  Patient


Teaching Methods:  Demonstration, Discussion


Response to Teaching:  Return Demonstration, Reinforcement Needed





OT Long Term Goals


Long Term Goals


Time Frame:  Am[place] 10, 2022


Eating (QC):  5


Oral Hygiene (QC):  5


Toileting Hygiene (QC):  3


Shower/Bathe Self (QC):  3


Upper Body Dressing (QC):  2


Lower Body Dressing (QC):  3


On/Off Footwear (QC):  4


1=Demonstrate adherence to instructed precautions during ADL tasks.


2=Patient will verbalize/demonstrate understanding of assistive 

devices/modifications for ADL.


3=Patient will improve strength/tolerance for activity to enable patient to 

perform ADL's.





OT Education/Plan


Problem List/Assessment


Assessment:  Decreased Activ Tolerance, Decreased Safety Aware, Decreased UE 

Strength, Dependent Transfers, Edema, Impaired Cognition, Impaired Coordination,

Impaired Funct Balance, Impaired Self-Care Skills, Restricted Funct UE ROM





Discharge Recommendations


Plan/Recommendations:  Continue POC


Therapy Discharge Recommendati:  Post Acute OT (long term care )





Treatment Plan/Plan of Care


Treatment,Training & Education:  Yes


Patient would benefit from OT for education, treatment and training to promote 

independence in ADL's, mobility, safety and/or upper extremity function for 

ADL's.


Plan of Care:  ADL Retraining, Caregiver Training, Functional Mobility, Group 

Exercise/Act as Ind, UE Funct Exercise/Act, UE Neuromus Re-Ed/Coord


Treatment Duration:  Jun 28, 2022


Frequency:  3 times per week (3-5x/week)


Estimated Hrs Per Day:  .25 hour per day


Rehab Potential:  Poor





Time/GCodes


Start Time:  09:48


Stop Time:  09:58


Total Time Billed (hr/min):  10


Billed Treatment Time


1 visit


EX











Akosua Lynn OT                 Jun 8, 2022 10:26

## 2022-06-08 NOTE — PROGRESS NOTE
Subjective


Subjective/Events-last exam


Patient w/o complaints this AM. Tolerating PO diet. PT to work with him today.


Review of Systems


Pulmonary:  Cough


Genitourinary:  Incontinence


Neurological:  Weakness, Incoordination, Confusion





Objective


Exam


Last Set of Vital Signs





Vital Signs








  Date Time  Temp Pulse Resp B/P (MAP) Pulse Ox O2 Delivery O2 Flow Rate FiO2


 


6/8/22 19:22 36.2 88 18 138/76 (96) 96 Room Air  


 


6/8/22 14:45       2.00 


 


6/5/22 11:11        30





Capillary Refill : Greater Than 3 Seconds


I&O











Intake and Output 


 


 6/8/22





 00:00


 


Intake Total 1900 ml


 


Output Total 1650 ml


 


Balance 250 ml


 


 


 


Intake Oral 900 ml


 


IV Total 1000 ml


 


Output Urine Total 1650 ml


 


# Bowel Movements 1








General:  Alert, Oriented X3, No Acute Distress


Lungs:  Clear to Auscultation, Normal Air Movement


Heart:  Regular Rate, No Murmurs


Abdomen:  Normal Bowel Sounds, Soft, No Tenderness


Extremities:  Other (R hemiparasis)


Neuro:  Normal Speech





Results/Procedures


Lab


Laboratory Tests


6/8/22 05:00: 


Sodium Level 138, Potassium Level 3.5L, Chloride Level 107, Carbon Dioxide Level

21, Anion Gap 10, Blood Urea Nitrogen 13, Creatinine 0.60, Estimat Glomerular 

Filtration Rate 110, BUN/Creatinine Ratio 22, Glucose Level 99, Calcium Level 

7.7L, Corrected Calcium 8.5, Magnesium Level 2.0, Total Bilirubin 0.9, Aspartate

Amino Transf (AST/SGOT) 33, Alanine Aminotransferase (ALT/SGPT) 44, Alkaline 

Phosphatase 37L, Total Protein 5.2L, Albumin 3.0L


6/8/22 05:43: Glucometer 95


6/8/22 05:50: 


White Blood Count 13.3H, Red Blood Count 3.86L, Hemoglobin 12.7L, Hematocrit 38L

, Mean Corpuscular Volume 97, Mean Corpuscular Hemoglobin 33, Mean Corpuscular 

Hemoglobin Concent 34, Red Cell Distribution Width 13.2, Platelet Count 183, 

Mean Platelet Volume 10.8, Immature Granulocyte % (Auto) 1, Neutrophils (%) 

(Auto) 81H, Lymphocytes (%) (Auto) 8L, Monocytes (%) (Auto) 9, Eosinophils (%) 

(Auto) 0, Basophils (%) (Auto) 0, Neutrophils # (Auto) 10.8H, Lymphocytes # 

(Auto) 1.1, Monocytes # (Auto) 1.3H, Eosinophils # (Auto) 0.0, Basophils # (Au

to) 0.0, Immature Granulocyte # (Auto) 0.2H


6/8/22 11:28: Glucometer 98


6/8/22 16:11: Glucometer 93


6/8/22 20:10: Glucometer 131H





Microbiology


5/31/22 Gram Stain - Final, Complete


          


5/31/22 Sputum Culture - Final, Complete


          Usual upper respiratory km


5/30/22 Blood Culture - Final, Complete


          No growth





Assessment/Plan


Assessment/Plan





(1) Acute respiratory failure with hypoxia


Status:  Acute


Assessment & Plan:  6/6: Extubated over the weekend, doing well on NC, MAT 

protocol


6/7: NC 1-2, will continue to titrate as tolerated, no home oxygen requirement 

prior to admission


6/8: Much improved today, 1-2 NC





(2) Acute exacerbation of chronic obstructive pulmonary disease (COPD)


Status:  Acute


(3) Non-ST elevation myocardial infarction (NSTEMI), initial care episode


Status:  Acute


Assessment & Plan:  6/6: Patient to cath lab today by Dr Posey


6/7: S/p 2 stents, DAP therapy





(4) Mixed hyperlipidemia


Status:  Chronic


(5) Primary hypertension


Status:  Chronic


(6) History of cerebrovascular accident


Status:  Chronic


Assessment & Plan:  - With Right hemiparesis





(7) Nonrheumatic aortic valve stenosis with regurgitation


Status:  Chronic


(8) Physical debility


Status:  Acute


Assessment & Plan:  6/7: PT/IRF eval ordered


6/8: patient will need SNF or IRF at discharge














MELL SHAIKH MD                Jun 8, 2022 21:08

## 2022-06-08 NOTE — PHYSICAL THERAPY EVALUATION
PT Evaluation-General


Medical Diagnosis


Admission Date


May 30, 2022 at 20:08


Medical Diagnosis:  acute resp failure with hypoxia


Onset Date:  May 30, 2022





Therapy Diagnosis


Therapy Diagnosis:  impaired mobility





Precautions


Precautions/Isolations:  Seizure, Fall Prevention, Standard Precautions, 

Pressure Ulcer





Referral


Physician:  Jose Elias


Reason for Referral:  Evaluation/Treatment





Medical History


Pertinent Medical History:  COPD, CVA


Additional Medical History


Past Medical History


Pneumonia, COPD


High Cholesterol, Hypertension


Stroke


Reviewed History:  Yes





Social History


Home:  [facility]


Current Living Status:  Spouse


patient states he has several stairs to enter his home with a handrail





Prior


Prior Level of Function


SCALE: Activities may be completed with or without assistive devices.





6-Indepedent-patient completes the activity by him/herself with no assistance 

from a helper.


5-Set-up or Clean-up Assistance-helper sets up or cleans up; patient completes 

activity. [place] assists only prior to or  


    following the activity.


4-Supervision or Touching Assistance-helper provides verbal cues and/or 

touching/steadying and/or contact guard assistance as patient completes 

activity. Assistance may be provided   


    throughout the activity or intermittently.


3-Partial/Moderate Assistance-helper does LESS THAN HALF the effort. [place] 

lifts, holds or supports trunk or limbs, but provides less than half the effort.


2-Substantial/Maximal Assistance-helper does MORE THAN HALF the effort. [place] 

lifts or holds trunk or limbs and provides more than half the effort.


2-Cgpvuspnw-uyixgg does ALL the effort. Patient does none of the effort to compl

ete the activity. Or, the assistance of 2 or more helpers is required for the 

patient to complete the  


    activity.


If activity was not attempted, code reason:


7-Patient Refused.


9-Not Applicable-not attempted and the patient did not perform the activity 

before the current illness, exacerbation or injury.


10-Not Attempted due to Environmental Limitations-(lack of equipment, weather 

restraints, etc.).


88-Not Attempted due to Medical Conditions or Safety Concerns.


Bed Mobility:  4


Transfers (B,C,W/C):  3


Gait:  3


Stairs:  3


Indoor Mobility (Ambulation):  Needed Some Help


Stairs:  Needed Some Help


Prior Devices Use:  None (spouse assist)





PT Evaluation-Current


Subjective


Patient in bed pre tx, agrees to PT, has no complaints of pain.





Pt/Family Goals


none stated





Objective


Patient Orientation:  Person (slow processing), Place


Attachments:  Oxygen, IV





ROM/Strength


ROM Lower Extremities


WNL


Strength Lower Extremities


grossly 3/5 BLE





Integumentary/Posture


Bowel Incontinence:  No


Bladder Incontinence:  No





Sensory


Vision:  Functional


Hearing:  Functional


Hand Dominance:  Right





Transfers


Roll Left to Right (QC):  2


Lying to Sitting/Side of Bed(Q:  2


Sit to Stand (QC):  2


Chair/Bed-to-Chair Xfer(QC):  2


Max assist for supine to sit and sit to stand, patient can assist a little with 

transfer to recliner.  Tried to use a walker and assist patient with the right 

side (due to right arm impairments from CVA) but patient was too retropulsive 

and was unable to take any steps and eventually had to sit back down.





Balance


Sitting Static:  Poor


Sitting Dynamic:  Poor


Standing Static:  Poor


 Standing Dynamic:  Poor





Treatment


BLE seated exercises x20 (AP, LAQ)





Assessment/Needs


Patient in recliner post tx with nurse call, phone, tray, all needs met.  

Patient instructed to call nurse if he needs to get back to bed.  Patient has 

declined in functional mobility since PT was seeing him last.


Rehab Potential:  Fair





PT Long Term Goals


Long Term Goals


PT Long Term Goals Time Frame:  Ammon 15, 2022


Roll Left & Right (QC):  4


Sit to Lying (QC):  3 (Lisa)


Lying-Sitting on Side/Bed(QC):  3 (Lisa)


Sit to Stand (QC):  3 (Lisa)


Chair/Bed-to-Chair Xfer(QC):  3 (Lisa)


Walk 10 feet (QC):  3 (Lisa)





PT Plan


Problem List


Problem List:  Activity Tolerance, Functional Strength, Safety, Balance, Gait, 

Transfer, Bed Mobility, ROM





Treatment/Plan


Treatment Plan:  Continue Plan of Care


Treatment Plan:  Bed Mobility, Education, Functional Activity Yanni, Functional 

Strength, Gait, Safety, Therapeutic Exercise, Transfers


Treatment Duration:  Ammon 15, 2022


Frequency:  6 times per week


Estimated Hrs Per Day:  .25 hour per day


Patient and/or Family Agrees t:  Yes





Safety Risks/Education


Patient Education:  Transfer Techniques, Correct Positioning, Safety Issues


Teaching Recipient:  Patient


Teaching Methods:  Demonstration, Discussion


Response to Teaching:  Reinforcement Needed





Discharge Recommendations


Plan


Patient will perform bed mobility and transfer training, balance and endurance 

training, functional strengthening, stair training, gait training, and 

education, to improve functional mobility and independence at home.


Therapy Discharge Recommendati:  24 Hour Supervision, Post Acute PT





Time/GCodes


Time In:  1043


Time Out:  1104


Total Billed Treatment Time:  21


Total Billed Treatment


1 visit


CALLI 21'











NORI BUCHANAN PT                 Jun 8, 2022 11:33

## 2022-06-08 NOTE — CARDIOLOGY PROGRESS NOTE
Subjective


Date Seen by Provider:  Jun 8, 2022


Time Seen by Provider:  08:50


Subjective/Events-last exam


Patient sitting up in bed, denies any chest pain. Denies leg pain





Objective-Cardiology


Exam


Last Set of Vital Signs





Vital Signs








 6/5/22 6/8/22 6/8/22





 11:11 14:45 16:08


 


Temp   36.8


 


Pulse   83


 


Resp   16


 


B/P (MAP)   153/81 (105)


 


Pulse Ox   96


 


O2 Delivery   Room Air


 


O2 Flow Rate  2.00 


 


FiO2 30  








I&O











Intake and Output 


 


 6/8/22





 00:00


 


Intake Total 1900 ml


 


Output Total 1650 ml


 


Balance 250 ml


 


 


 


Intake Oral 900 ml


 


IV Total 1000 ml


 


Output Urine Total 1650 ml


 


# Bowel Movements 1








General:  Alert, Oriented X3, No Acute Distress


HEENT:  Atraumatic


Neck:  Supple


Lungs:  Clear to Auscultation, Normal Air Movement


Heart:  Regular Rate, No Murmurs


Abdomen:  Normal Bowel Sounds, Soft, No Tenderness, No Masses


Extremities:  Other (2+ pitting edema equal bilaterally)


Skin:  No Rashes, No Breakdown


Neuro:  Normal Speech, Other (Right hemiparesis)


Psych/Mental Status:  Mental Status NL, Mood NL





Results


Lab


Laboratory Tests


6/8/22 05:00








6/8/22 05:50














A/P-Cardiology


Admission Diagnosis


Acute respiratory failure


Acute exacerbation of COPD


Non-ST elevation myocardial infarction, type II MI


Aortic valve stenosis





Assessment/Plan


Status post acute respiratory failure, ventilator dependent.


Acute exacerbation of COPD with pneumonia.


Extubated on June 5, 2022.


Feeling better and reporting improvement.





Coronary artery disease, non-ST elevation myocardial infarction, probably type 

II myocardial infarction secondary to severe hypoxemia and respiratory failure.


Underlying coronary artery disease cannot be entirely excluded.


Cardiac catheterization was done on June 6, 2022, calcified coronary system with

severe stenosis in the mid LAD, status post stenting using alma point stent 3 x 

23 mm expanded to 3.1 mm with excellent results, started on aspirin and Plavix.





Left thigh pain, has been complaining of left leg pain.  BLE arterial duplex 

done 6/7/22 showing  bilateral hemodynamic significant stenoses on


the left at the level of the common femoral and on the right at the level of the

popliteal artery. Has +1 DP to left leg, denies any pain at this time. Will c

ontinue with ASA and Plavix and continue to monitor. 





Congestive heart failure, acute left ventricular diastolic dysfunction, 

preserved systolic function.  Continue to monitor





Aortic valve stenosis, aortic regurgitation.


2D echo was reported by Dr. Pereira as ejection fraction 55%, grade 2 diastolic 

dysfunction, moderate aortic valve stenosis with peak gradient 58 mmHg, mean 

gradient 30 mmHg, valve area 1.1 cm, moderate aortic regurgitation, moderate 

tricuspid regurgitation, estimated pulmonary artery pressure around 40 mmHg.





Hypertension, continue to monitor blood pressure





Hyperlipidemia, monitor lipids.





History of CVA with right hemiparesis.  Has been maintained on aspirin as an 

outpatient





Tobaccoism, still an active smoker.





Supervisory-Addendum Brief


Supervisory Addendum


Participated in pt care:  history, MDM, physical


Personally performed:  exam, history, MDM


Care discussed with:  PA


Results interpretation:  Verified all documentation


Notes:


Patient was seen and evaluated with Umm, examination performed, management 

plan was discussed, agree with the current scribed note, I made few changes to 

the note using Italic font


Patient was seen at bedside, feeling better


His leg pain is better.  He had palpable dorsalis pedis pulse, stronger on the 

right


Ultrasound of the lower extremities was discussed


Patient will need angiogram and possible intervention in the future on the lower

extremities











UMM ZIEGLER   Jun 8, 2022 09:39


SARAH BURDICK MD               Jun 8, 2022 16:56

## 2022-06-09 VITALS — DIASTOLIC BLOOD PRESSURE: 84 MMHG | SYSTOLIC BLOOD PRESSURE: 181 MMHG

## 2022-06-09 VITALS — SYSTOLIC BLOOD PRESSURE: 145 MMHG | DIASTOLIC BLOOD PRESSURE: 83 MMHG

## 2022-06-09 VITALS — SYSTOLIC BLOOD PRESSURE: 170 MMHG | DIASTOLIC BLOOD PRESSURE: 79 MMHG

## 2022-06-09 VITALS — SYSTOLIC BLOOD PRESSURE: 196 MMHG | DIASTOLIC BLOOD PRESSURE: 87 MMHG

## 2022-06-09 VITALS — SYSTOLIC BLOOD PRESSURE: 181 MMHG | DIASTOLIC BLOOD PRESSURE: 84 MMHG

## 2022-06-09 VITALS — DIASTOLIC BLOOD PRESSURE: 75 MMHG | SYSTOLIC BLOOD PRESSURE: 184 MMHG

## 2022-06-09 VITALS — SYSTOLIC BLOOD PRESSURE: 144 MMHG | DIASTOLIC BLOOD PRESSURE: 72 MMHG

## 2022-06-09 VITALS — SYSTOLIC BLOOD PRESSURE: 155 MMHG | DIASTOLIC BLOOD PRESSURE: 73 MMHG

## 2022-06-09 LAB
ALBUMIN SERPL-MCNC: 2.8 GM/DL (ref 3.2–4.5)
ALP SERPL-CCNC: 38 U/L (ref 40–136)
ALT SERPL-CCNC: 44 U/L (ref 0–55)
BASOPHILS # BLD AUTO: 0 10^3/UL (ref 0–0.1)
BASOPHILS NFR BLD AUTO: 0 % (ref 0–10)
BILIRUB SERPL-MCNC: 0.6 MG/DL (ref 0.1–1)
BUN/CREAT SERPL: 24
CALCIUM SERPL-MCNC: 7.7 MG/DL (ref 8.5–10.1)
CHLORIDE SERPL-SCNC: 108 MMOL/L (ref 98–107)
CO2 SERPL-SCNC: 19 MMOL/L (ref 21–32)
CREAT SERPL-MCNC: 0.58 MG/DL (ref 0.6–1.3)
EOSINOPHIL # BLD AUTO: 0 10^3/UL (ref 0–0.3)
EOSINOPHIL NFR BLD AUTO: 0 % (ref 0–10)
GFR SERPLBLD BASED ON 1.73 SQ M-ARVRAT: 111 ML/MIN
GLUCOSE SERPL-MCNC: 113 MG/DL (ref 70–105)
HCT VFR BLD CALC: 39 % (ref 40–54)
HGB BLD-MCNC: 13.2 G/DL (ref 13.3–17.7)
LYMPHOCYTES # BLD AUTO: 1.1 10^3/UL (ref 1–4)
LYMPHOCYTES NFR BLD AUTO: 8 % (ref 12–44)
MAGNESIUM SERPL-MCNC: 2 MG/DL (ref 1.6–2.4)
MANUAL DIFFERENTIAL PERFORMED BLD QL: YES
MCH RBC QN AUTO: 33 PG (ref 25–34)
MCHC RBC AUTO-ENTMCNC: 34 G/DL (ref 32–36)
MCV RBC AUTO: 98 FL (ref 80–99)
MONOCYTES # BLD AUTO: 1.3 10^3/UL (ref 0–1)
MONOCYTES NFR BLD AUTO: 9 % (ref 0–12)
MONOCYTES NFR BLD: 11 %
NEUTROPHILS # BLD AUTO: 11.7 10^3/UL (ref 1.8–7.8)
NEUTROPHILS NFR BLD AUTO: 82 % (ref 42–75)
NEUTS BAND NFR BLD MANUAL: 83 %
PLATELET # BLD: 182 10^3/UL (ref 130–400)
PMV BLD AUTO: 10.7 FL (ref 9–12.2)
POTASSIUM SERPL-SCNC: 3.6 MMOL/L (ref 3.6–5)
PROT SERPL-MCNC: 5.2 GM/DL (ref 6.4–8.2)
RBC MORPH BLD: NORMAL
SODIUM SERPL-SCNC: 138 MMOL/L (ref 135–145)
VARIANT LYMPHS NFR BLD MANUAL: 6 %
WBC # BLD AUTO: 14.2 10^3/UL (ref 4.3–11)

## 2022-06-09 RX ADMIN — Medication SCH MG: at 06:06

## 2022-06-09 RX ADMIN — IPRATROPIUM BROMIDE AND ALBUTEROL SULFATE SCH ML: .5; 3 SOLUTION RESPIRATORY (INHALATION) at 10:06

## 2022-06-09 RX ADMIN — DOCUSATE SODIUM SCH MG: 100 CAPSULE ORAL at 09:50

## 2022-06-09 RX ADMIN — IPRATROPIUM BROMIDE AND ALBUTEROL SULFATE SCH ML: .5; 3 SOLUTION RESPIRATORY (INHALATION) at 21:41

## 2022-06-09 RX ADMIN — PANTOPRAZOLE SODIUM SCH MG: 40 TABLET, DELAYED RELEASE ORAL at 09:50

## 2022-06-09 RX ADMIN — DOCUSATE SODIUM SCH MG: 100 CAPSULE ORAL at 20:14

## 2022-06-09 RX ADMIN — METOPROLOL SUCCINATE SCH MG: 50 TABLET, EXTENDED RELEASE ORAL at 09:50

## 2022-06-09 RX ADMIN — INSULIN ASPART SCH UNIT: 100 INJECTION, SOLUTION INTRAVENOUS; SUBCUTANEOUS at 22:46

## 2022-06-09 RX ADMIN — SODIUM CHLORIDE SCH MLS/HR: 900 INJECTION, SOLUTION INTRAVENOUS at 00:22

## 2022-06-09 RX ADMIN — IPRATROPIUM BROMIDE AND ALBUTEROL SULFATE SCH ML: .5; 3 SOLUTION RESPIRATORY (INHALATION) at 02:50

## 2022-06-09 RX ADMIN — LISINOPRIL SCH MG: 20 TABLET ORAL at 09:50

## 2022-06-09 RX ADMIN — ENOXAPARIN SODIUM SCH MG: 100 INJECTION SUBCUTANEOUS at 09:50

## 2022-06-09 RX ADMIN — INSULIN ASPART SCH UNIT: 100 INJECTION, SOLUTION INTRAVENOUS; SUBCUTANEOUS at 16:45

## 2022-06-09 RX ADMIN — AMLODIPINE BESYLATE SCH MG: 5 TABLET ORAL at 09:50

## 2022-06-09 RX ADMIN — INSULIN ASPART SCH UNIT: 100 INJECTION, SOLUTION INTRAVENOUS; SUBCUTANEOUS at 05:34

## 2022-06-09 RX ADMIN — CLOPIDOGREL BISULFATE SCH MG: 75 TABLET, FILM COATED ORAL at 09:50

## 2022-06-09 RX ADMIN — SODIUM CHLORIDE SCH MLS/HR: 900 INJECTION, SOLUTION INTRAVENOUS at 21:44

## 2022-06-09 RX ADMIN — ASPIRIN 81 MG CHEWABLE TABLET SCH MG: 81 TABLET CHEWABLE at 09:50

## 2022-06-09 RX ADMIN — INSULIN ASPART SCH UNIT: 100 INJECTION, SOLUTION INTRAVENOUS; SUBCUTANEOUS at 10:45

## 2022-06-09 NOTE — OCCUPATIONAL THER DAILY NOTE
OT Current Status-Daily Note


Subjective


Pt with very garbled speech this date, difficult to understand.





Appearance


Pt left sitting in recliner, all needs within reach.





Mental Status/Objective


Patient Orientation:  Person, Confused


Attachments:  IV, Telemetry





ADL-Treatment


Therapy Code Descriptions/Definitions 





Functional [place] Measure:


0=Not Assessed/NA        4=Minimal Assistance


1=Total Assistance        5=Supervision or Setup


2=Maximal Assistance  6=Modified [place]


3=Moderate Assistance 7=Complete IndependenceSCALE: Activities may be completed 

with or without assistive devices.





6-Indepedent-patient completes the activity by him/herself with no assistance 

from a helper.


5-Set-up or Clean-up Assistance-helper sets up or cleans up; patient completes 

activity. [place] assists only prior to or  


    following the activity.


4-Supervision or Touching Assistance-helper provides verbal cues and/or 

touching/steadying and/or contact guard assistance as patient completes 

activity. Assistance may be provided   


    throughout the activity or intermittently.


3-Partial/Moderate Assistance-helper does LESS THAN HALF the effort. [place] 

lifts, holds or supports trunk or limbs, but provides less than half the effort.


2-Substantial/Maximal Assistance-helper does MORE THAN HALF the effort. [place] 

lifts or holds trunk or limbs and provides more than half the effort.


0-Vfctktern-orarwa does ALL the effort. Patient does none of the effort to co

mplete the activity. Or, the assistance of 2 or more helpers is required for the

patient to complete the  


    activity.


If activity was not attempted, code reason:


7-Patient Refused.


9-Not Applicable-not attempted and the patient did not perform the activity 

before the current illness, exacerbation or injury.


10-Not Attempted due to Environmental Limitations-(lack of equipment, weather 

restraints, etc.).


88-Not Attempted due to Medical Conditions or Safety Concerns.


Shower/Bathe Self (QC):  1


Supine>sit: Max a, poor motor planning observed, difficulty following 

directions. Initial min a needed for sitting balance, improves to SBA post cues.

Stand pivot from bed to chair: dependent. Pt is very retropulsive and is unable 

to adjust posture with cues or physical assistance. Sponge bath (with wet wipes)

performed seated in chair. With visual cues, Pt was able to wash chest, upper 

thighs, front sukhdeep area, down to ankles and, under R arm with LUE. Assist needed

to wash LUE and feet. Buttocks not performed, however pt would require assist x2

to complete task as he would need max a to stand/maintain balance as second pers

on washed buttocks. New gown donned. After placement of shower cap, assist 

needed to comb hair secondary to multiple tangles.





Education


OT Patient Education:  Correct positioning, Modified ADL techniques, Progress 

toward Goal/Update tx plan, Purpose of tx/functional activities, Reviewed 

precautions, Rehab process, Safety issues, Transfer techniques


Teaching Recipient:  Patient


Teaching Methods:  Demonstration, Discussion


Response to Teaching:  Return Demonstration, Reinforcement Needed





OT Long Term Goals


Long Term Goals


Time Frame:  Ammon 10, 2022


Eating (QC):  5


Oral Hygiene (QC):  5


Toileting Hygiene (QC):  3


Shower/Bathe Self (QC):  3


Upper Body Dressing (QC):  2


Lower Body Dressing (QC):  3


On/Off Footwear (QC):  4


1=Demonstrate adherence to instructed precautions during ADL tasks.


2=Patient will verbalize/demonstrate understanding of assistive devic

es/modifications for ADL.


3=Patient will improve strength/tolerance for activity to enable patient to 

perform ADL's.





OT Education/Plan


Problem List/Assessment


Assessment:  Decreased Activ Tolerance, Decreased Safety Aware, Decreased UE 

Strength, Dependent Transfers, Impaired Bed Mobility, Impaired Cognition, 

Impaired Coordination, Impaired Funct Balance, Impaired I ADL's, Impaired Self-

Care Skills, Restricted Funct UE ROM





Discharge Recommendations


Plan/Recommendations:  Continue POC


Therapy Discharge Recommendati:  Post Acute OT





Treatment Plan/Plan of Care


Treatment,Training & Education:  Yes


Patient would benefit from OT for education, treatment and training to promote 

independence in ADL's, mobility, safety and/or upper extremity function for 

ADL's.


Plan of Care:  ADL Retraining, Caregiver Training, Functional Mobility, Group 

Exercise/Act as Ind, UE Funct Exercise/Act, UE Neuromus Re-Ed/Coord


Treatment Duration:  Jun 28, 2022


Frequency:  3 times per week (3-5x/week)


Estimated Hrs Per Day:  .25 hour per day


Rehab Potential:  Fair





Time/GCodes


Start Time:  09:13


Stop Time:  09:36


Total Time Billed (hr/min):  23


Billed Treatment Time


1 visit


ADL x2











Akosua Lynn OT                 Jun 9, 2022 10:02

## 2022-06-09 NOTE — DISCHARGE SUMMARY
Discharge Summary


Reconcile Patient Problems


Problems Reviewed?:  Yes





Hospital Course


Hospital Course


Date of Admission: May 30, 2022 at 20:08 


Admission Diagnosis :  





Family Physician/Provider: Clay Rahman MD,(DDU)  





Date of Discharge: 6/10/22 


Discharge Diagnosis: 


Acute Respiratory Failure with hypoxia


AECOPD


NSTEMI


HTN


HLD


H/o CVA


Debility








Labs and Pending Lab Test:


Laboratory Tests


6/9/22 05:25: Glucometer 132H


6/9/22 06:15: 


White Blood Count 14.2H, Red Blood Count 3.96L, Hemoglobin 13.2L, Hematocrit 39L

, Mean Corpuscular Volume 98, Mean Corpuscular Hemoglobin 33, Mean Corpuscular 

Hemoglobin Concent 34, Red Cell Distribution Width 12.8, Platelet Count 182, Yady

n Platelet Volume 10.7, Immature Granulocyte % (Auto) 1, Neutrophils (%) (Auto) 

82H, Lymphocytes (%) (Auto) 8L, Monocytes (%) (Auto) 9, Eosinophils (%) (Auto) 

0, Basophils (%) (Auto) 0, Neutrophils # (Auto) 11.7H, Lymphocytes # (Auto) 1.1,

Monocytes # (Auto) 1.3H, Eosinophils # (Auto) 0.0, Basophils # (Auto) 0.0, 

Immature Granulocyte # (Auto) 0.1, Neutrophils % (Manual) 83, Lymphocytes % 

(Manual) 6, Monocytes % (Manual) 11, Blood Morphology Comment NORMAL, Sodium 

Level 138, Potassium Level 3.6, Chloride Level 108H, Carbon Dioxide Level 19L, 

Anion Gap 11, Blood Urea Nitrogen 14, Creatinine 0.58L, Estimat Glomerular 

Filtration Rate 111, BUN/Creatinine Ratio 24, Glucose Level 113H, Calcium Level 

7.7L, Corrected Calcium 8.7, Magnesium Level 2.0, Total Bilirubin 0.6, Aspartate

Amino Transf (AST/SGOT) 37H, Alanine Aminotransferase (ALT/SGPT) 44, Alkaline 

Phosphatase 38L, Total Protein 5.2L, Albumin 2.8L


6/9/22 10:40: Glucometer 114H


6/9/22 16:35: Glucometer 116H


6/9/22 20:22: Glucometer 93





Microbiology


5/31/22 Gram Stain - Final, Complete


          


5/31/22 Sputum Culture - Final, Complete


          Usual upper respiratory km


5/30/22 Blood Culture - Final, Complete


          No growth


Skilled NF Admit to:  


Certification (SNF)


I certify that SNF services are required to be given on an inpatient basis 

because of the above named patient's need for skilled nursing care on a 

continuing basis for the conditions(s) for which he/she was receiving inpatient 

hospital services prior to his/her transfer to the SNF.


Skilled Nursing Facility Order:  Nursing Services, Occupational Ther-Evaluate & 

Treat, Physical Therapy-Evaluate & Treat, Speech Language-Evaluate & Treat


Oxygen Delivery Method:  Room Air


Discharge Diet:  Cardiac Diet


Daily Activity as Tolerated:  Yes


Resuscitation Status:  Full Code


Mell Mo[place]uma 


Jun 9, 2022 


21:31





Discharge Physical Exam


General:  Alert, No Acute Distress


Lungs:  Clear to Auscultation, Normal Air Movement


Heart:  Regular Rate


Abdomen:  Normal Bowel Sounds, Soft, No Tenderness, No Masses


Extremities:  No Edema


Neuro:  Normal Speech


Psych/Mental Status:  Mental Status NL, Mood NL











MELL MOCTEZUMA MD                Jun 9, 2022 21:37

## 2022-06-09 NOTE — PROGRESS NOTE
Subjective


Subjective/Events-last exam


Patient doing well this AM. Sitting in chair this AM. Tolerating PO diet. 

Working with PT.


Review of Systems


General:  Fatigue


Pulmonary:  Dyspnea


Neurological:  Weakness, Numbness, Incoordination





Objective


Exam


Last Set of Vital Signs





Vital Signs








  Date Time  Temp Pulse Resp B/P (MAP) Pulse Ox O2 Delivery O2 Flow Rate FiO2


 


6/9/22 16:35 37.6 88 20 145/83 (103) 97 Room Air  


 


6/9/22 10:09        21


 


6/9/22 10:06       0.00 





Capillary Refill : Greater Than 3 Seconds


I&O











Intake and Output 


 


 6/9/22





 00:00


 


Intake Total 1400 ml


 


Output Total 1600 ml


 


Balance -200 ml


 


 


 


Intake Oral 1400 ml


 


Output Urine Total 1600 ml








General:  Alert, No Acute Distress


Lungs:  Clear to Auscultation, Normal Air Movement


Heart:  Regular Rate, No Murmurs


Abdomen:  Soft, No Tenderness, No Masses


Extremities:  Other (mild swelling in arm with hemiparesis)


Skin:  No Rashes


Neuro:  Normal Speech





Results/Procedures


Lab


Laboratory Tests


6/8/22 20:10: Glucometer 131H


6/9/22 05:25: Glucometer 132H


6/9/22 06:15: 


White Blood Count 14.2H, Red Blood Count 3.96L, Hemoglobin 13.2L, Hematocrit 39L

, Mean Corpuscular Volume 98, Mean Corpuscular Hemoglobin 33, Mean Corpuscular 

Hemoglobin Concent 34, Red Cell Distribution Width 12.8, Platelet Count 182, 

Mean Platelet Volume 10.7, Immature Granulocyte % (Auto) 1, Neutrophils (%) 

(Auto) 82H, Lymphocytes (%) (Auto) 8L, Monocytes (%) (Auto) 9, Eosinophils (%) 

(Auto) 0, Basophils (%) (Auto) 0, Neutrophils # (Auto) 11.7H, Lymphocytes # 

(Auto) 1.1, Monocytes # (Auto) 1.3H, Eosinophils # (Auto) 0.0, Basophils # 

(Auto) 0.0, Immature Granulocyte # (Auto) 0.1, Neutrophils % (Manual) 83, 

Lymphocytes % (Manual) 6, Monocytes % (Manual) 11, Blood Morphology Comment 

NORMAL, Sodium Level 138, Potassium Level 3.6, Chloride Level 108H, Carbon 

Dioxide Level 19L, Anion Gap 11, Blood Urea Nitrogen 14, Creatinine 0.58L, 

Estimat Glomerular Filtration Rate 111, BUN/Creatinine Ratio 24, Glucose Level 

113H, Calcium Level 7.7L, Corrected Calcium 8.7, Magnesium Level 2.0, Total 

Bilirubin 0.6, Aspartate Amino Transf (AST/SGOT) 37H, Alanine Aminotransferase 

(ALT/SGPT) 44, Alkaline Phosphatase 38L, Total Protein 5.2L, Albumin 2.8L


6/9/22 10:40: Glucometer 114H


6/9/22 16:35: Glucometer 116H





Microbiology


5/31/22 Gram Stain - Final, Complete


          


5/31/22 Sputum Culture - Final, Complete


          Usual upper respiratory km


5/30/22 Blood Culture - Final, Complete


          No growth





Assessment/Plan


Assessment/Plan





(1) Acute respiratory failure with hypoxia


Status:  Resolved


Assessment & Plan:  6/6: Extubated over the weekend, doing well on NC, MAT 

protocol


6/7: NC 1-2, will continue to titrate as tolerated, no home oxygen requirement 

prior to admission


6/8: Much improved today, 1-2 NC


6/9: Plan to go to SNF tomorrow, accepted in [facility]





(2) Acute exacerbation of chronic obstructive pulmonary disease (COPD)


Status:  Acute


(3) Non-ST elevation myocardial infarction (NSTEMI), initial care episode


Status:  Acute


Assessment & Plan:  6/6: Patient to cath lab today by Dr Posey


6/7: S/p 2 stents, DAP therapy





(4) Mixed hyperlipidemia


Status:  Chronic


(5) Primary hypertension


Status:  Chronic


(6) History of cerebrovascular accident


Status:  Chronic


Assessment & Plan:  - With Right hemiparesis





(7) Nonrheumatic aortic valve stenosis with regurgitation


Status:  Chronic


(8) Physical debility


Status:  Acute


Assessment & Plan:  6/7: PT/IRF eval ordered


6/8: patient will need SNF or IRF at discharge














MELL SHAIKH MD                Jun 9, 2022 18:55

## 2022-06-09 NOTE — CARDIOLOGY PROGRESS NOTE
Subjective


Date Seen by Provider:  Jun 9, 2022


Time Seen by Provider:  09:13


Subjective/Events-last exam


Patient was seen at bedside, laying down comfortably


Feeling better.  No chest pain, no neck pain.


Review of Systems


General:  No Chills, No Night Sweats; Fatigue; No Malaise, No Appetite, No Other


HEENT:  No Head Aches, No Visual Changes, No Eye Pain, No Ear Pain, No 

Dysphasia, No Sinus Congestion, No Post Nasal Drip, No Sore Throat, No Other


Pulmonary:  No Dyspnea, No Cough, No Pleuritic Chest Pain, No Other


Cardiovascular:  No: Chest Pain, Palpitations, Orthopnea, Paroxysmal Noc. 

Dyspnea, Edema, Lt Headedness, Other





Objective-Cardiology


Exam


Last Set of Vital Signs





Vital Signs








 6/5/22 6/8/22 6/9/22





 11:11 20:00 08:08


 


Temp   36.7


 


Pulse   90


 


Resp   18


 


B/P (MAP)   181/84 (116)


 


Pulse Ox   98


 


O2 Delivery   Room Air


 


O2 Flow Rate  3.00 


 


FiO2 30  








I&O











Intake and Output 


 


 6/9/22





 00:00


 


Intake Total 1400 ml


 


Output Total 1600 ml


 


Balance -200 ml


 


 


 


Intake Oral 1400 ml


 


Output Urine Total 1600 ml








General:  Alert, Oriented X3, No Acute Distress


HEENT:  Atraumatic


Neck:  Supple


Lungs:  Clear to Auscultation, Normal Air Movement


Heart:  Regular Rate, Normal S1, Normal S2, No Murmurs


Abdomen:  Normal Bowel Sounds, Soft, No Tenderness


Extremities:  Other (R hemiparasis)


Skin:  No Rashes, No Breakdown


Neuro:  Normal Speech


Psych/Mental Status:  Mental Status NL, Mood NL





Results


Lab


Laboratory Tests


6/9/22 06:15














A/P-Cardiology


Admission Diagnosis


Acute respiratory failure


Acute exacerbation of COPD


Non-ST elevation myocardial infarction, type II MI


Aortic valve stenosis





Assessment/Plan


Status post acute respiratory failure, ventilator dependent.


Acute exacerbation of COPD with pneumonia.


Extubated on June 5, 2022.


Feeling better and reporting improvement.





Coronary artery disease, non-ST elevation myocardial infarction, probably type 

II myocardial infarction secondary to severe hypoxemia and respiratory failure.


Cardiac catheterization was done on June 6, 2022, calcified coronary system with

severe stenosis in the mid LAD, status post stenting using alma point stent 3 x 

23 mm expanded to 3.1 mm with excellent results,


Patient was started on aspirin and Plavix.  Continue on dual antiplatelet 

therapy





Left thigh pain, has been complaining of left leg pain.  Reporting improvement 

at this time, no active pain was reported.


BLE arterial duplex done 6/7/22 showing  bilateral hemodynamic significant 

stenoses on the left at the level of the common femoral and on the right at the 

level of the popliteal artery. Has +1 DP to left leg, denies any pain at this 

time. Will continue with ASA and Plavix and continue to monitor.


Will consider peripheral intervention if patient starts to have symptoms





Congestive heart failure, acute left ventricular diastolic dysfunction, 

preserved systolic function.  Continue to monitor





Aortic valve stenosis, aortic regurgitation.


2D echo was reported by Dr. Pereira as ejection fraction 55%, grade 2 diastolic 

dysfunction, moderate aortic valve stenosis with peak gradient 58 mmHg, mean 

gradient 30 mmHg, valve area 1.1 cm, moderate aortic regurgitation, moderate 

tricuspid regurgitation, estimated pulmonary artery pressure around 40 mmHg.





Hypertension, poor control, I added amlodipine 5 mg daily and will evaluate t

olerance and response





Hyperlipidemia, monitor lipids.





History of CVA with right hemiparesis.  Has been maintained on aspirin as an 

outpatient





Tobaccoism, still an active smoker.











SARAH BURDICK MD               Jun 9, 2022 09:15

## 2022-06-10 VITALS — SYSTOLIC BLOOD PRESSURE: 151 MMHG | DIASTOLIC BLOOD PRESSURE: 74 MMHG

## 2022-06-10 VITALS — DIASTOLIC BLOOD PRESSURE: 80 MMHG | SYSTOLIC BLOOD PRESSURE: 173 MMHG

## 2022-06-10 LAB
ALBUMIN SERPL-MCNC: 3.2 GM/DL (ref 3.2–4.5)
ALP SERPL-CCNC: 48 U/L (ref 40–136)
ALT SERPL-CCNC: 46 U/L (ref 0–55)
BASOPHILS # BLD AUTO: 0 10^3/UL (ref 0–0.1)
BASOPHILS NFR BLD AUTO: 0 % (ref 0–10)
BILIRUB SERPL-MCNC: 0.8 MG/DL (ref 0.1–1)
BUN/CREAT SERPL: 17
CALCIUM SERPL-MCNC: 8 MG/DL (ref 8.5–10.1)
CHLORIDE SERPL-SCNC: 106 MMOL/L (ref 98–107)
CO2 SERPL-SCNC: 23 MMOL/L (ref 21–32)
CREAT SERPL-MCNC: 0.66 MG/DL (ref 0.6–1.3)
EOSINOPHIL # BLD AUTO: 0 10^3/UL (ref 0–0.3)
EOSINOPHIL NFR BLD AUTO: 0 % (ref 0–10)
GFR SERPLBLD BASED ON 1.73 SQ M-ARVRAT: 107 ML/MIN
GLUCOSE SERPL-MCNC: 107 MG/DL (ref 70–105)
HCT VFR BLD CALC: 42 % (ref 40–54)
HGB BLD-MCNC: 14.2 G/DL (ref 13.3–17.7)
LYMPHOCYTES # BLD AUTO: 2 10^3/UL (ref 1–4)
LYMPHOCYTES NFR BLD AUTO: 11 % (ref 12–44)
MAGNESIUM SERPL-MCNC: 1.7 MG/DL (ref 1.6–2.4)
MANUAL DIFFERENTIAL PERFORMED BLD QL: NO
MCH RBC QN AUTO: 33 PG (ref 25–34)
MCHC RBC AUTO-ENTMCNC: 34 G/DL (ref 32–36)
MCV RBC AUTO: 98 FL (ref 80–99)
MONOCYTES # BLD AUTO: 1.5 10^3/UL (ref 0–1)
MONOCYTES NFR BLD AUTO: 8 % (ref 0–12)
NEUTROPHILS # BLD AUTO: 14.2 10^3/UL (ref 1.8–7.8)
NEUTROPHILS NFR BLD AUTO: 80 % (ref 42–75)
PLATELET # BLD: 213 10^3/UL (ref 130–400)
PMV BLD AUTO: 10.4 FL (ref 9–12.2)
POTASSIUM SERPL-SCNC: 3.4 MMOL/L (ref 3.6–5)
PROT SERPL-MCNC: 5.6 GM/DL (ref 6.4–8.2)
SODIUM SERPL-SCNC: 139 MMOL/L (ref 135–145)
WBC # BLD AUTO: 17.9 10^3/UL (ref 4.3–11)

## 2022-06-10 RX ADMIN — ENOXAPARIN SODIUM SCH MG: 100 INJECTION SUBCUTANEOUS at 09:13

## 2022-06-10 RX ADMIN — CLOPIDOGREL BISULFATE SCH MG: 75 TABLET, FILM COATED ORAL at 09:13

## 2022-06-10 RX ADMIN — ASPIRIN 81 MG CHEWABLE TABLET SCH MG: 81 TABLET CHEWABLE at 09:13

## 2022-06-10 RX ADMIN — PANTOPRAZOLE SODIUM SCH MG: 40 TABLET, DELAYED RELEASE ORAL at 09:13

## 2022-06-10 RX ADMIN — AMLODIPINE BESYLATE SCH MG: 5 TABLET ORAL at 09:13

## 2022-06-10 RX ADMIN — IPRATROPIUM BROMIDE AND ALBUTEROL SULFATE SCH ML: .5; 3 SOLUTION RESPIRATORY (INHALATION) at 07:28

## 2022-06-10 RX ADMIN — METOPROLOL SUCCINATE SCH MG: 50 TABLET, EXTENDED RELEASE ORAL at 09:13

## 2022-06-10 RX ADMIN — LISINOPRIL SCH MG: 20 TABLET ORAL at 09:13

## 2022-06-10 RX ADMIN — SODIUM CHLORIDE SCH MLS/HR: 900 INJECTION, SOLUTION INTRAVENOUS at 06:37

## 2022-06-10 RX ADMIN — INSULIN ASPART SCH UNIT: 100 INJECTION, SOLUTION INTRAVENOUS; SUBCUTANEOUS at 06:28

## 2022-06-10 RX ADMIN — DOCUSATE SODIUM SCH MG: 100 CAPSULE ORAL at 09:13

## 2022-06-10 RX ADMIN — Medication SCH MG: at 06:37

## 2022-06-10 NOTE — OCCUPATIONAL THER DAILY NOTE
OT Current Status-Daily Note


Subjective


Pt Denies pain. Mostly smiles, limited communication, can respond to simple 

questions.





Appearance


Pt left sitting in recliner, all needs within reach





Mental Status/Objective


Patient Orientation:  Person


Attachments:  IV, Telemetry





ADL-Treatment


Therapy Code Descriptions/Definitions 





Functional Fossil Measure:


0=Not Assessed/NA        4=Minimal Assistance


1=Total Assistance        5=Supervision or Setup


2=Maximal Assistance  6=Modified Fossil


3=Moderate Assistance 7=Complete IndependenceSCALE: Activities may be completed 

with or without assistive devices.





6-Indepedent-patient completes the activity by him/herself with no assistance 

from a helper.


5-Set-up or Clean-up Assistance-helper sets up or cleans up; patient completes 

activity. Shirley assists only prior to or  


    following the activity.


4-Supervision or Touching Assistance-helper provides verbal cues and/or 

touching/steadying and/or contact guard assistance as patient completes 

activity. Assistance may be provided   


    throughout the activity or intermittently.


3-Partial/Moderate Assistance-helper does LESS THAN HALF the effort. Shirley 

lifts, holds or supports trunk or limbs, but provides less than half the effort.


2-Substantial/Maximal Assistance-helper does MORE THAN HALF the effort. Shirley 

lifts or holds trunk or limbs and provides more than half the effort.


8-Rmcjwfvva-gnslcy does ALL the effort. Patient does none of the effort to 

complete the activity. Or, the assistance of 2 or more helpers is required for 

the patient to complete the  


    activity.


If activity was not attempted, code reason:


7-Patient Refused.


9-Not Applicable-not attempted and the patient did not perform the activity 

before the current illness, exacerbation or injury.


10-Not Attempted due to Environmental Limitations-(lack of equipment, weather 

restraints, etc.).


88-Not Attempted due to Medical Conditions or Safety Concerns.





Other Treatment


 UE A/AAROM exercises performed seated in recliner. R shoulder AAROM ~100 

degrees, full elbow extension/flexion with extra time,  tone kicks in with elbow

extension. Contracted hand/wrist, PROM within available range. Tremors notable 

in L wrist extension only.  L shoulder: AAROM ~120 degrees. AROM L elbow-

distally. 1 set, 10 reps all joints. Fatigues quickly at bilateral shoulders, 

needing AAROM after ~6 reps. Education on continuing exercises post discharge. 

Unsure of pt's comprehension to education.





Education


OT Patient Education:  Correct positioning, Exercise program, Purpose of tx/func

tional activities, Reviewed precautions, Rehab process, Safety issues


Teaching Recipient:  Patient


Teaching Methods:  Demonstration, Discussion


Response to Teaching:  Return Demonstration, Reinforcement Needed





OT Long Term Goals


Long Term Goals


Time Frame:  Ammon 10, 2022


Eating (QC):  5


Oral Hygiene (QC):  5


Toileting Hygiene (QC):  3


Shower/Bathe Self (QC):  3


Upper Body Dressing (QC):  2


Lower Body Dressing (QC):  3


On/Off Footwear (QC):  4


1=Demonstrate adherence to instructed precautions during ADL tasks.


2=Patient will verbalize/demonstrate understanding of assistive 

devices/modifications for ADL.


3=Patient will improve strength/tolerance for activity to enable patient to 

perform ADL's.





OT Education/Plan


Problem List/Assessment


Assessment:  Decreased Activ Tolerance, Decreased Safety Aware, Decreased UE 

Strength, Dependent Transfers, Impaired Cognition, Impaired Coordination, 

Impaired Funct Balance, Impaired Self-Care Skills, Restricted Funct UE ROM





Discharge Recommendations


Plan/Recommendations:  Continue POC


Therapy Discharge Recommendati:  Post Acute OT





Treatment Plan/Plan of Care


Treatment,Training & Education:  Yes


Patient would benefit from OT for education, treatment and training to promote 

independence in ADL's, mobility, safety and/or upper extremity function for 

ADL's.


Plan of Care:  ADL Retraining, Caregiver Training, Functional Mobility, Group Ex

ercise/Act as Ind, UE Funct Exercise/Act, UE Neuromus Re-Ed/Coord


Treatment Duration:  Jun 28, 2022


Frequency:  3 times per week (3-5x/week)


Estimated Hrs Per Day:  .25 hour per day


Agreement:  Yes


Rehab Potential:  Fair





Time/GCodes


Start Time:  09:16


Stop Time:  09:26


Total Time Billed (hr/min):  10


Billed Treatment Time


1 visit


Ex











Akosua Lynn OT                Ammon 10, 2022 09:34

## 2022-06-10 NOTE — PROGRESS NOTE - CARDIOLOGY
Cardiology SOAP Progress Note


Subjective:


Sitting up in recliner


Going home today


No c/o CP or SOB





Objective:


I&O/Vital Signs











 6/9/22 6/10/22 6/10/22 6/10/22





 23:59 01:00 04:06 07:00


 


Temp 36.6  36.5 


 


Pulse 96 76 89 112


 


Resp 18  18 


 


B/P (MAP) 155/73 (100)  173/80 (111) 


 


Pulse Ox 95  97 


 


O2 Delivery Room Air  Room Air 


 


    





 6/10/22 6/10/22 6/10/22 6/10/22





 07:28 08:00 08:15 10:02


 


Temp   37.1 


 


Pulse   51 


 


Resp   18 


 


B/P (MAP)   151/74 (99) 


 


Pulse Ox 95  95 


 


O2 Delivery Room Air Nasal Cannula Room Air 


 


O2 Flow Rate  3.00  














 6/10/22





 00:00


 


Intake Total 1490 ml


 


Output Total 600 ml


 


Balance 890 ml








Neurologic/Psychiatric:  other (R heimparesis)





Results/Procedures:


Labs


Laboratory Tests


6/9/22 10:40: Glucometer 114H


6/9/22 16:35: Glucometer 116H


6/9/22 20:22: Glucometer 93


6/10/22 05:25: Glucometer 83


6/10/22 06:40: 


White Blood Count 17.9H, Red Blood Count 4.26L, Hemoglobin 14.2, Hematocrit 42, 

Mean Corpuscular Volume 98, Mean Corpuscular Hemoglobin 33, Mean Corpuscular 

Hemoglobin Concent 34, Red Cell Distribution Width 13.0, Platelet Count 213, 

Mean Platelet Volume 10.4, Immature Granulocyte % (Auto) 1, Neutrophils (%) 

(Auto) 80H, Lymphocytes (%) (Auto) 11L, Monocytes (%) (Auto) 8, Eosinophils (%) 

(Auto) 0, Basophils (%) (Auto) 0, Neutrophils # (Auto) 14.2H, Lymphocytes # 

(Auto) 2.0, Monocytes # (Auto) 1.5H, Eosinophils # (Auto) 0.0, Basophils # 

(Auto) 0.0, Immature Granulocyte # (Auto) 0.1, Sodium Level 139, Potassium Level

3.4L, Chloride Level 106, Carbon Dioxide Level 23, Anion Gap 10, Blood Urea 

Nitrogen 11, Creatinine 0.66, Estimat Glomerular Filtration Rate 107, 

BUN/Creatinine Ratio 17, Glucose Level 107H, Calcium Level 8.0L, Corrected 

Calcium 8.6, Magnesium Level 1.7, Total Bilirubin 0.8, Aspartate Amino Transf 

(AST/SGOT) 43H, Alanine Aminotransferase (ALT/SGPT) 46, Alkaline Phosphatase 48,

Total Protein 5.6L, Albumin 3.2





Microbiology


5/31/22 Gram Stain - Final, Complete


          


5/31/22 Sputum Culture - Final, Complete


          Usual upper respiratory km


5/30/22 Blood Culture - Final, Complete


          No growth





Laboratory Tests


6/9/22 06:15








6/10/22 06:40











A/P:


Assessment:


Status post acute respiratory failure 


- Acute exacerbation of COPD with pneumonia.


- Extubated on June 5, 2022.





Coronary artery disease


- non-ST elevation myocardial infarction, probably type II myocardial infarction

secondary to severe hypoxemia and respiratory failure.


- Cardiac catheterization was done on June 6, 2022, calcified coronary system 

with severe stenosis in the mid LAD, status post stenting using alma point stent 

3 x 23 mm expanded to 3.1 mm with excellent results,


- Continue on dual antiplatelet therapy





Left thigh pain, has been complaining of left leg pain.  Reporting improvement 

at this time, no active pain was reported.


- BLE arterial duplex done 6/7/22 by Dr. Posey showing  bilateral hemodynamic 

significant stenoses on the left at the level of the common femoral and on the 

right at the level of the popliteal artery. Has +1 DP to left leg, denies any 

pain at this time. 


- Will continue with ASA and Plavix and continue to monitor as out pt by Dr. Posey


- Will consider peripheral intervention if patient starts to have symptoms as 

out pt





- Diastolic Congestive heart failure, acute left ventricular diastolic 

dysfunction, preserved systolic function





Aortic valve stenosis, aortic regurgitation


- 2D echo was reported by Dr. Pereira as ejection fraction 55%, grade 2 

diastolic dysfunction, moderate aortic valve stenosis with peak gradient 58 

mmHg, mean gradient 30 mmHg, valve area 1.1 cm, moderate aortic regurgitation, 

moderate tricuspid regurgitation, estimated pulmonary artery pressure around 40 

mmHg.





Hypertension





Hyperlipidemia





History of CVA with right hemiparesis





Tobaccoism


- still an active smoker


- cessation advised


Plan:


Dr. Posey's notes reviewed in detail


Continue current medication regimen


Discharging home today per medical services


Out pt f/u with MANJINDER [facility]           Ammon 10, 2022 10:30

## 2022-08-10 ENCOUNTER — HOSPITAL ENCOUNTER (OUTPATIENT)
Dept: HOSPITAL 75 - RAD FS | Age: 62
End: 2022-08-10
Attending: INTERNAL MEDICINE
Payer: MEDICARE

## 2022-08-10 DIAGNOSIS — I65.23: Primary | ICD-10-CM

## 2022-08-10 LAB
BUN/CREAT SERPL: 10
CREAT SERPL-MCNC: 0.49 MG/DL (ref 0.6–1.3)
GFR SERPLBLD BASED ON 1.73 SQ M-ARVRAT: 117 ML/MIN

## 2022-08-10 PROCEDURE — 70498 CT ANGIOGRAPHY NECK: CPT

## 2022-08-10 PROCEDURE — 84520 ASSAY OF UREA NITROGEN: CPT

## 2022-08-10 PROCEDURE — 36415 COLL VENOUS BLD VENIPUNCTURE: CPT

## 2022-08-10 PROCEDURE — 82565 ASSAY OF CREATININE: CPT

## 2022-08-10 RX ADMIN — Medication PRN ML: at 10:12

## 2022-08-10 RX ADMIN — KETOROLAC TROMETHAMINE ONE ML: 30 INJECTION, SOLUTION INTRAMUSCULAR; INTRAVENOUS at 10:12

## 2022-08-10 RX ADMIN — IOHEXOL ONE ML: 350 INJECTION, SOLUTION INTRAVENOUS at 10:12

## 2023-01-17 ENCOUNTER — HOSPITAL ENCOUNTER (INPATIENT)
Dept: HOSPITAL 75 - ER FS | Age: 63
LOS: 8 days | Discharge: HOME HEALTH | DRG: 871 | End: 2023-01-25
Attending: INTERNAL MEDICINE | Admitting: FAMILY MEDICINE
Payer: MEDICARE

## 2023-01-17 VITALS — WEIGHT: 143.52 LBS | HEIGHT: 70 IN | BODY MASS INDEX: 20.55 KG/M2

## 2023-01-17 DIAGNOSIS — I69.351: ICD-10-CM

## 2023-01-17 DIAGNOSIS — Z79.82: ICD-10-CM

## 2023-01-17 DIAGNOSIS — J44.0: ICD-10-CM

## 2023-01-17 DIAGNOSIS — E87.20: ICD-10-CM

## 2023-01-17 DIAGNOSIS — J44.1: ICD-10-CM

## 2023-01-17 DIAGNOSIS — J96.21: ICD-10-CM

## 2023-01-17 DIAGNOSIS — I50.9: ICD-10-CM

## 2023-01-17 DIAGNOSIS — E78.00: ICD-10-CM

## 2023-01-17 DIAGNOSIS — I73.9: ICD-10-CM

## 2023-01-17 DIAGNOSIS — A41.9: Primary | ICD-10-CM

## 2023-01-17 DIAGNOSIS — Z95.820: ICD-10-CM

## 2023-01-17 DIAGNOSIS — Z79.52: ICD-10-CM

## 2023-01-17 DIAGNOSIS — Z95.5: ICD-10-CM

## 2023-01-17 DIAGNOSIS — R65.21: ICD-10-CM

## 2023-01-17 DIAGNOSIS — J18.9: ICD-10-CM

## 2023-01-17 DIAGNOSIS — J96.22: ICD-10-CM

## 2023-01-17 DIAGNOSIS — I25.10: ICD-10-CM

## 2023-01-17 DIAGNOSIS — D64.9: ICD-10-CM

## 2023-01-17 DIAGNOSIS — I11.0: ICD-10-CM

## 2023-01-17 DIAGNOSIS — F17.210: ICD-10-CM

## 2023-01-17 DIAGNOSIS — I95.9: ICD-10-CM

## 2023-01-17 DIAGNOSIS — D69.6: ICD-10-CM

## 2023-01-17 DIAGNOSIS — E83.42: ICD-10-CM

## 2023-01-17 DIAGNOSIS — Z20.822: ICD-10-CM

## 2023-01-17 LAB
ALBUMIN SERPL-MCNC: 4.4 GM/DL (ref 3.2–4.5)
ALP SERPL-CCNC: 135 U/L (ref 40–136)
ALT SERPL-CCNC: 47 U/L (ref 0–55)
APTT BLD: 30 SEC (ref 24–35)
APTT PPP: YELLOW S
ARTERIAL PATENCY WRIST A: (no result)
BACTERIA #/AREA URNS HPF: (no result) /HPF
BASE EXCESS STD BLDA CALC-SCNC: -10.7 MMOL/L (ref -2.5–2.5)
BASE EXCESS STD BLDA CALC-SCNC: -15.5 MMOL/L (ref -2.5–2.5)
BASE EXCESS STD BLDA CALC-SCNC: -6.4 MMOL/L (ref -2.5–2.5)
BASE EXCESS STD BLDA CALC-SCNC: -6.5 MMOL/L (ref -2.5–2.5)
BASOPHILS # BLD AUTO: 0.1 10^3/UL (ref 0–0.1)
BASOPHILS NFR BLD AUTO: 1 % (ref 0–10)
BDY SITE: (no result)
BILIRUB SERPL-MCNC: 0.7 MG/DL (ref 0.1–1)
BILIRUB UR QL STRIP: NEGATIVE
BODY TEMPERATURE: (no result)
BODY TEMPERATURE: 36
BODY TEMPERATURE: 36.3
BODY TEMPERATURE: 36.4
BUN/CREAT SERPL: 5
CALCIUM SERPL-MCNC: 9.2 MG/DL (ref 8.5–10.1)
CHLORIDE SERPL-SCNC: 91 MMOL/L (ref 98–107)
CO2 BLDA CALC-SCNC: 16.6 MMOL/L (ref 21–31)
CO2 BLDA CALC-SCNC: 18 MMOL/L (ref 21–31)
CO2 BLDA CALC-SCNC: 19.8 MMOL/L (ref 21–31)
CO2 BLDA CALC-SCNC: 21.8 MMOL/L (ref 21–31)
CO2 SERPL-SCNC: 16 MMOL/L (ref 21–32)
CREAT SERPL-MCNC: 0.61 MG/DL (ref 0.6–1.3)
EOSINOPHIL # BLD AUTO: 0 10^3/UL (ref 0–0.3)
EOSINOPHIL NFR BLD AUTO: 0 % (ref 0–10)
FIBRINOGEN PPP-MCNC: CLEAR MG/DL
GFR SERPLBLD BASED ON 1.73 SQ M-ARVRAT: 109 ML/MIN
GLUCOSE SERPL-MCNC: 175 MG/DL (ref 70–105)
GLUCOSE UR STRIP-MCNC: NEGATIVE MG/DL
HCT VFR BLD CALC: 52 % (ref 40–54)
HGB BLD-MCNC: 17.4 G/DL (ref 13.3–17.7)
HYALINE CASTS #/AREA URNS LPF: (no result) /LPF
INHALED O2 FLOW RATE: (no result) L/MIN
INR PPP: 1 (ref 0.8–1.4)
KETONES UR QL STRIP: (no result)
LEUKOCYTE ESTERASE UR QL STRIP: NEGATIVE
LYMPHOCYTES # BLD AUTO: 3.1 10^3/UL (ref 1–4)
LYMPHOCYTES NFR BLD AUTO: 23 % (ref 12–44)
MAGNESIUM SERPL-MCNC: 1.7 MG/DL (ref 1.6–2.4)
MANUAL DIFFERENTIAL PERFORMED BLD QL: NO
MCH RBC QN AUTO: 33 PG (ref 25–34)
MCHC RBC AUTO-ENTMCNC: 34 G/DL (ref 32–36)
MCV RBC AUTO: 99 FL (ref 80–99)
MONOCYTES # BLD AUTO: 0.8 10^3/UL (ref 0–1)
MONOCYTES NFR BLD AUTO: 5 % (ref 0–12)
NEUTROPHILS # BLD AUTO: 9.8 10^3/UL (ref 1.8–7.8)
NEUTROPHILS NFR BLD AUTO: 71 % (ref 42–75)
NITRITE UR QL STRIP: NEGATIVE
PCO2 BLDA: 26 MMHG (ref 35–45)
PCO2 BLDA: 51 MMHG (ref 35–45)
PCO2 BLDA: 54 MMHG (ref 35–45)
PCO2 BLDA: 61 MMHG (ref 35–45)
PH BLDA: 7.05 [PH] (ref 7.37–7.43)
PH BLDA: 7.08 [PH] (ref 7.37–7.43)
PH BLDA: 7.22 [PH] (ref 7.37–7.43)
PH BLDA: 7.43 [PH] (ref 7.37–7.43)
PH UR STRIP: 7 [PH] (ref 5–9)
PLATELET # BLD: 201 10^3/UL (ref 130–400)
PMV BLD AUTO: 9.8 FL (ref 9–12.2)
PO2 BLDA: 146 MMHG (ref 79–93)
PO2 BLDA: 174 MMHG (ref 79–93)
PO2 BLDA: 33 MMHG (ref 79–93)
PO2 BLDA: 88 MMHG (ref 79–93)
POTASSIUM SERPL-SCNC: 3.5 MMOL/L (ref 3.6–5)
PROT SERPL-MCNC: 8.3 GM/DL (ref 6.4–8.2)
PROT UR QL STRIP: (no result)
PROTHROMBIN TIME: 14.1 SEC (ref 12.2–14.7)
RBC #/AREA URNS HPF: (no result) /HPF
SAO2 % BLDA FROM PO2: 100 % (ref 94–100)
SAO2 % BLDA FROM PO2: 40 % (ref 94–100)
SAO2 % BLDA FROM PO2: 91 % (ref 94–100)
SAO2 % BLDA FROM PO2: 98 % (ref 94–100)
SODIUM SERPL-SCNC: 132 MMOL/L (ref 135–145)
SP GR UR STRIP: 1.02 (ref 1.02–1.02)
SQUAMOUS #/AREA URNS HPF: (no result) /HPF
TRIGL SERPL-MCNC: 88 MG/DL (ref ?–150)
VENTILATION MODE VENT: NO
WBC # BLD AUTO: 13.9 10^3/UL (ref 4.3–11)
WBC #/AREA URNS HPF: (no result) /HPF

## 2023-01-17 PROCEDURE — 94002 VENT MGMT INPAT INIT DAY: CPT

## 2023-01-17 PROCEDURE — 82947 ASSAY GLUCOSE BLOOD QUANT: CPT

## 2023-01-17 PROCEDURE — 93041 RHYTHM ECG TRACING: CPT

## 2023-01-17 PROCEDURE — 94003 VENT MGMT INPAT SUBQ DAY: CPT

## 2023-01-17 PROCEDURE — 87205 SMEAR GRAM STAIN: CPT

## 2023-01-17 PROCEDURE — 81000 URINALYSIS NONAUTO W/SCOPE: CPT

## 2023-01-17 PROCEDURE — 36415 COLL VENOUS BLD VENIPUNCTURE: CPT

## 2023-01-17 PROCEDURE — 87040 BLOOD CULTURE FOR BACTERIA: CPT

## 2023-01-17 PROCEDURE — 94760 N-INVAS EAR/PLS OXIMETRY 1: CPT

## 2023-01-17 PROCEDURE — 80053 COMPREHEN METABOLIC PANEL: CPT

## 2023-01-17 PROCEDURE — 96365 THER/PROPH/DIAG IV INF INIT: CPT

## 2023-01-17 PROCEDURE — 96367 TX/PROPH/DG ADDL SEQ IV INF: CPT

## 2023-01-17 PROCEDURE — 84100 ASSAY OF PHOSPHORUS: CPT

## 2023-01-17 PROCEDURE — 80320 DRUG SCREEN QUANTALCOHOLS: CPT

## 2023-01-17 PROCEDURE — 94799 UNLISTED PULMONARY SVC/PX: CPT

## 2023-01-17 PROCEDURE — 83735 ASSAY OF MAGNESIUM: CPT

## 2023-01-17 PROCEDURE — 0BH17EZ INSERTION OF ENDOTRACHEAL AIRWAY INTO TRACHEA, VIA NATURAL OR ARTIFICIAL OPENING: ICD-10-PCS | Performed by: STUDENT IN AN ORGANIZED HEALTH CARE EDUCATION/TRAINING PROGRAM

## 2023-01-17 PROCEDURE — 85007 BL SMEAR W/DIFF WBC COUNT: CPT

## 2023-01-17 PROCEDURE — 85027 COMPLETE CBC AUTOMATED: CPT

## 2023-01-17 PROCEDURE — 51702 INSERT TEMP BLADDER CATH: CPT

## 2023-01-17 PROCEDURE — 83605 ASSAY OF LACTIC ACID: CPT

## 2023-01-17 PROCEDURE — 87070 CULTURE OTHR SPECIMN AEROBIC: CPT

## 2023-01-17 PROCEDURE — 85025 COMPLETE CBC W/AUTO DIFF WBC: CPT

## 2023-01-17 PROCEDURE — 85610 PROTHROMBIN TIME: CPT

## 2023-01-17 PROCEDURE — 94640 AIRWAY INHALATION TREATMENT: CPT

## 2023-01-17 PROCEDURE — 87449 NOS EACH ORGANISM AG IA: CPT

## 2023-01-17 PROCEDURE — 93306 TTE W/DOPPLER COMPLETE: CPT

## 2023-01-17 PROCEDURE — 71045 X-RAY EXAM CHEST 1 VIEW: CPT

## 2023-01-17 PROCEDURE — 87081 CULTURE SCREEN ONLY: CPT

## 2023-01-17 PROCEDURE — 84478 ASSAY OF TRIGLYCERIDES: CPT

## 2023-01-17 PROCEDURE — 5A1945Z RESPIRATORY VENTILATION, 24-96 CONSECUTIVE HOURS: ICD-10-PCS

## 2023-01-17 PROCEDURE — 96375 TX/PRO/DX INJ NEW DRUG ADDON: CPT

## 2023-01-17 PROCEDURE — 93005 ELECTROCARDIOGRAM TRACING: CPT

## 2023-01-17 PROCEDURE — 83880 ASSAY OF NATRIURETIC PEPTIDE: CPT

## 2023-01-17 PROCEDURE — 5A09357 ASSISTANCE WITH RESPIRATORY VENTILATION, LESS THAN 24 CONSECUTIVE HOURS, CONTINUOUS POSITIVE AIRWAY PRESSURE: ICD-10-PCS | Performed by: FAMILY MEDICINE

## 2023-01-17 PROCEDURE — 84484 ASSAY OF TROPONIN QUANT: CPT

## 2023-01-17 PROCEDURE — 96368 THER/DIAG CONCURRENT INF: CPT

## 2023-01-17 PROCEDURE — 87636 SARSCOV2 & INF A&B AMP PRB: CPT

## 2023-01-17 PROCEDURE — 85730 THROMBOPLASTIN TIME PARTIAL: CPT

## 2023-01-17 PROCEDURE — 96361 HYDRATE IV INFUSION ADD-ON: CPT

## 2023-01-17 PROCEDURE — 82805 BLOOD GASES W/O2 SATURATION: CPT

## 2023-01-17 PROCEDURE — 84145 PROCALCITONIN (PCT): CPT

## 2023-01-17 RX ADMIN — SODIUM CHLORIDE SCH MLS/HR: 900 INJECTION INTRAVENOUS at 17:34

## 2023-01-17 RX ADMIN — METHYLPREDNISOLONE SODIUM SUCCINATE SCH MG: 40 INJECTION, POWDER, FOR SOLUTION INTRAMUSCULAR; INTRAVENOUS at 17:33

## 2023-01-17 RX ADMIN — PROPOFOL SCH MLS/HR: 10 INJECTION, EMULSION INTRAVENOUS at 17:34

## 2023-01-17 RX ADMIN — IPRATROPIUM BROMIDE AND ALBUTEROL SULFATE SCH ML: .5; 3 SOLUTION RESPIRATORY (INHALATION) at 22:25

## 2023-01-17 RX ADMIN — Medication SCH MLS/HR: at 18:30

## 2023-01-17 RX ADMIN — PROPOFOL SCH MLS/HR: 10 INJECTION, EMULSION INTRAVENOUS at 21:22

## 2023-01-17 RX ADMIN — VASOPRESSIN SCH MLS/HR: 20 INJECTION INTRAVENOUS at 19:31

## 2023-01-17 RX ADMIN — Medication SCH MLS/HR: at 17:57

## 2023-01-17 RX ADMIN — SODIUM CHLORIDE SCH MLS/HR: 900 INJECTION, SOLUTION INTRAVENOUS at 17:57

## 2023-01-17 RX ADMIN — IPRATROPIUM BROMIDE AND ALBUTEROL SULFATE SCH ML: .5; 3 SOLUTION RESPIRATORY (INHALATION) at 17:25

## 2023-01-17 NOTE — PROGRESS NOTE
Standard Progress Note


Progress Notes/Assess & Plan


Date Seen by a Provider:  Jan 17, 2023


Time Seen by a Provider:  17:00


Progress/Assessment & Plan


Patient was seen in the [place] emergency department and transferred to the 

[place] ICU for acute respiratory failure requiring BiPAP.  The patient went 

through the ER on the way to the ICU in [place], and his oxygen saturation was

82% despite being on 100% CPAP.  His pressure had decompensated en route as well

requiring EMS to start dopamine. I accompanied the patient to the intensive care

unit.  He was bagged for preoxygenation with 100% oxygen.  The highest his 

oxygen saturation would get was 84%.  He was then intubated with 150 mg of 

ketamine and 150 mg of succinylcholine.  A 7.5 ET tube was placed and was 23 at 

the lip using video laryngoscopy on a single attempt with no further 

complication. There were bilateral breath sounds with color change noted.  

Breath sounds were absent over the epigastrium.  Oxygen saturation remained 

around 84%.  He was given a DuoNeb treatment through the ventilator and his 

oxygen saturation went up to the high 90s.  A central line was then placed 

because he was on vasopressors.  It was a 16 cm triple-lumen placed in his right

IJ with ultrasound guidance and real-time with sterile field throughout the 

process.  It was secured with suture with a sterile dressing applied afterwards.

 X-ray was then ordered to confirm placement.  The ET tube, central line, and NG

tube were all in good place.  Care was turned over to the intensive care 

physician.


Final Diagnosis


Acute hypoxic respiratory failure


COPD exacerbation


Undifferentiated shock











SANDOR VACA MD          Jan 17, 2023 17:49

## 2023-01-17 NOTE — ED DYSPNEA
General


Stated Complaint:  SOB


Source of Information:  Patient, EMS (Independent historians as patient unable 

to answer questions due to his respiratory distress), Spouse (Independent 

Historian as patient on BiPAP and not answering questions wih his respiratory 

distress)


Exam Limitations:  Other (respiratory distress)





History of Present Illness


Date Seen by Provider:  2023


Time Seen by Provider:  14:37


Initial Comments


62-year-old male presenting by EMS from home.  EMS acting as an independent 

historian for the patient as he was not answering questions due to being so 

short of breath.  He had not been feeling well throughout the day according to 

both his wife and EMS report.  In the last few hours he started having 

difficulty breathing and when EMS arrived they found him to have oxygen 

saturation in the 70s on room air. He was started on supplemental O2 by fire and

then EMS changed him over to CPAP. They were able to get his O2 sats up to low 

90s with CPAP and O2 at 10 Lpm. He had diaphoresis and was tripoding for EMS.  

He has a complicated medical history including coronary artery disease with 

prior stents, peripheral vascular disease with stents in his carotids and legs, 

hypertension, COPD, CHF, prior stroke with right-sided hemiparesis.  His wife 

reports that he was not on home O2.  They felt that he had not looked well and 

tried to get him to come to be seen or go to the hospital earlier in the day but

he had refused.  Finally he was feeling so bad and working so hard to breathe 

that he was not refusing to be seen and EMS was called to transport him.  

Patient denied having fever or chest pain.  He was working hard to breathe and 

on CPAP and then BiPAP so it was difficult for him to speak.  He has been 

diaphoretic from working hard to breathe but rectal temperature was finally 

obtained and was 97.2. His wife reports he had a stent to right leg within the l

ast month at [place].


Timing/Duration:  4-6 Hours


Severity:  Severe


Activities at Onset:  None


Prior Episodes/Possible Cause:  Occasional Episodes


Modifying Factors:  Worse With Activity; Improves With Oxygen


Associated Symptoms:  Anxiety, Cough, Loss of Appetite, Weakness





Allergies and Home Medications


Allergies


Coded Allergies:  


     No Known Drug Allergies (Unverified , 22)





Patient Home Medication List


Home Medication List Reviewed:  Yes


Amlodipine Besylate (Amlodipine Besylate) 5 Mg Tablet, 5 MG PO DAILY


   Prescribed by: MELL SHAIKH on 6/10/22 0917


Aspirin (Children's Aspirin) 81 Mg Tab.chew, 81 MG PO DAILY@0900


   Prescribed by: MELL SHAIKH on 6/10/22 0917


Atorvastatin Calcium (Atorvastatin Calcium) 20 Mg Tablet, 20 MG PO HS


   Prescribed by: MELL SHAIKH on 6/10/22 0917


Clopidogrel Bisulfate (Clopidogrel) 75 Mg Tablet, 75 MG PO DAILY


   Prescribed by: MELL SHAIKH on 6/10/22 0917


Ipratropium/Albuterol Sulfate (Iprat-Albut 0.5-3(2.5) mg/3 ml) 0.5 Mg-3 Mg (2.5 

Mg Base)/3 Ml Ampul.neb, 3 ML IH Q6H PRN for SHORTNESS OF BREATH


   Prescribed by: MELL SHAIKH on 6/10/22 0917


Lisinopril (Lisinopril) 20 Mg Tablet, 20 MG PO DAILY@0900


   Prescribed by: MELL SHAIKH on 6/10/22 0917


Metoprolol Succinate (Metoprolol Succinate) 50 Mg Tab.er.24h, 50 MG PO DAILY


   Prescribed by: MELL SHAIKH on 6/10/22 0917


Pantoprazole Sodium (Pantoprazole Sodium) 40 Mg Tablet.dr, 40 MG PO DAILY


   Prescribed by: MELL SHAIKH on 6/10/22 0917


Prednisone (Prednisone) 20 Mg Tab, 20 MG PO DAILY@0700


   Prescribed by: MELL SHAIKH on 6/10/22 0917


Thiamine HCl (Vitamin B-1) 100 Mg Tablet, 100 MG PO DAILY@0700


   Prescribed by: MELL SHAIKH on 6/10/22 0917





Review of Systems


Review of Systems


Constitutional:  No chills; diaphoresis; No fever; malaise (not felt well 

today), weakness


EENTM:  No nose congestion


Respiratory:  cough; No hemoptysis; short of breath, wheezing


Cardiovascular:  No chest pain


Gastrointestinal:  No nausea, No vomiting


Genitourinary:  No dysuria


Musculoskeletal:  see HPI (healing from recent stent placement for his legs)


Skin:  change in color (bruising to groin from recent stent procedure)


Psychiatric/Neurological:  Anxiety, Weakness


Endocrine:  Excessive Sweating


Hematologic/Lymphatic:  Denies Blood Clots





Past Medical-Social-Family Hx


Patient Social History


Tobacco Use?:  Yes


Tobacco type used:  Cigarettes





Immunizations Up To Date


First/Initial COVID19 Vaccinat:  21


Second COVID19 Vaccination Jeyson:  21


Third COVID19 Vaccination Date:  21





Past Medical History


Surgery/Hospitalization HX:  


COPD, Stroke, CAD, Peripheral vascular disease with stents, Carotid


stenosis with stenting, CHF, hypertension, hyperlipidemia, tobacco abuse


Surgeries:  Yes


Coronary Stent, Vascular Surgery


Pneumonia, COPD


High Cholesterol, Hypertension


Stroke





Physical Exam


Vital Signs





Vital Signs - First Documented








 23





 14:40 17:00


 


Temp 36.2 


 


Pulse 106 


 


Resp 35 


 


B/P (MAP) 175/76 (109) 


 


Pulse Ox 100 


 


O2 Delivery NIV Bilevel 


 


O2 Flow Rate  100.00





Capillary Refill :


Height, Weight, BMI


Height: '"


Weight: lbs. oz. kg; 24.67 BMI


Method:


General Appearance:  Anxious, Chronically ill, Severe Distress (Increased work 

of breathing and difficulty answering questions due to respiratory distress and 

being on BiPAP)


HEENT:  PERRL/EOMI, Moist Mucous Membranes


Neck:  Full Range of Motion, Normal Inspection, Non Tender, Supple


Respiratory:  Chest Non Tender, Accessory Muscle Use, Decreased Breath Sounds, 

Respiratory Distress, Wheezing (Expiratory wheezing throughout)


Cardiovascular:  Normal Peripheral Pulses, Tachycardia


Peripheral Pulses:  2+ Dorsalis Pedis (R), 2+ Left Dors-Pedis (L), 2+ Radial 

Pulses (R), 2+ Radial Pulses (L)


Gastrointestinal:  Normal Bowel Sounds, No Pulsatile Mass, Non Tender, Soft


Extremity:  No Calf Tenderness, Pedal Edema (Trace pedal edema to right lower 

extremity from recent stenting procedure)


Neurologic/Psychiatric:  Alert; No Oriented x3 (Unable to assess orientation as 

he was having difficulty answering questions between his respiratory distress 

and being placed on BiPAP)


Skin:  Cool, Diaphoresis, Pallor





Focused Exam


Sepsis Stage:  Severe Sepsis


Possible Source:  Pulmonary


Lactate Level


23 15:00: Lactic Acid Level 9.57*H





Time of Focused Exam:  16:06


Respiratory:  Chest Non Tender, No Accessory Muscle Use, No Respiratory Distress

(Appeared more comfortable on the BiPAP and was breathing easier), Decreased 

Breath Sounds (Improved air movement from when he first arrived)


Cardiovascular:  Normal Peripheral Pulses, Tachycardia (Sinus tachycardia with 

heart rate 100 bpm)


Capillary Refill:  Less Than 3 Seconds


Peripheral Pulses:  2+ Femoral (R), 2+ Femoral (L), 2+ Radial Pulses (R), 2+ 

Radial Pulses (L)


Skin:  warm/dry


Lactic Acid Level





Laboratory Tests








Test


 23


15:00


 


Lactic Acid Level


 9.57 MMOL/L


(0.50-2.00)  *H








Within 3hrs of presentation:  Admin fluids, Admin 30ml/kg IBW due to BMI>30, 

Admin ABX, Blood cultures prior to ABX's, Focus exam, Lactate level, Other 

(Admit to ICU at [facility])





Procedures/Interventions


Date of ETT Placement:  May 31, 2022


Time of ETT Placement:  





Progress/Results/Core Measures


Results/Orders


Lab Results





Laboratory Tests








Test


 23


15:00 23


15:08 23


15:45 Range/Units


 


 


White Blood Count


 13.9 H


 


 


 4.3-11.0


10^3/uL


 


Red Blood Count


 5.22 


 


 


 4.30-5.52


10^6/uL


 


Hemoglobin 17.4    13.3-17.7  g/dL


 


Hematocrit 52    40-54  %


 


Mean Corpuscular Volume 99    80-99  fL


 


Mean Corpuscular Hemoglobin 33    25-34  pg


 


Mean Corpuscular Hemoglobin


Concent 34 


 


 


 32-36  g/dL





 


Red Cell Distribution Width 12.5    10.0-14.5  %


 


Platelet Count


 201 


 


 


 130-400


10^3/uL


 


Mean Platelet Volume 9.8    9.0-12.2  fL


 


Immature Granulocyte % (Auto) 1     %


 


Neutrophils (%) (Auto) 71    42-75  %


 


Lymphocytes (%) (Auto) 23    12-44  %


 


Monocytes (%) (Auto) 5    0-12  %


 


Eosinophils (%) (Auto) 0    0-10  %


 


Basophils (%) (Auto) 1    0-10  %


 


Neutrophils # (Auto)


 9.8 H


 


 


 1.8-7.8


10^3/uL


 


Lymphocytes # (Auto)


 3.1 


 


 


 1.0-4.0


10^3/uL


 


Monocytes # (Auto)


 0.8 


 


 


 0.0-1.0


10^3/uL


 


Eosinophils # (Auto)


 0.0 


 


 


 0.0-0.3


10^3/uL


 


Basophils # (Auto)


 0.1 


 


 


 0.0-0.1


10^3/uL


 


Immature Granulocyte # (Auto)


 0.1 


 


 


 0.0-0.1


10^3/uL


 


Prothrombin Time 14.1    12.2-14.7  SEC


 


INR Comment 1.0    0.8-1.4  


 


Activated Partial


Thromboplast Time 30 


 


 


 24-35  SEC





 


Sodium Level 132 L   135-145  MMOL/L


 


Potassium Level 3.5 L   3.6-5.0  MMOL/L


 


Chloride Level 91 L     MMOL/L


 


Carbon Dioxide Level 16 L   21-32  MMOL/L


 


Anion Gap 25 H   5-14  MMOL/L


 


Blood Urea Nitrogen 3 L   7-18  MG/DL


 


Creatinine


 0.61 


 


 


 0.60-1.30


MG/DL


 


Estimat Glomerular Filtration


Rate 109 


 


 


  





 


BUN/Creatinine Ratio 5     


 


Glucose Level 175 H     MG/DL


 


Lactic Acid Level


 9.57 *H


 


 


 0.50-2.00


MMOL/L


 


Calcium Level 9.2    8.5-10.1  MG/DL


 


Corrected Calcium 8.9    8.5-10.1  MG/DL


 


Magnesium Level 1.7    1.6-2.4  MG/DL


 


Total Bilirubin 0.7    0.1-1.0  MG/DL


 


Aspartate Amino Transf


(AST/SGOT) 66 H


 


 


 5-34  U/L





 


Alanine Aminotransferase


(ALT/SGPT) 47 


 


 


 0-55  U/L





 


Alkaline Phosphatase 135      U/L


 


Troponin I < 0.30    <0.30  NG/ML


 


Pro-B-Type Natriuretic Peptide 8934.0 H   <125.0  PG/ML


 


Total Protein 8.3 H   6.4-8.2  GM/DL


 


Albumin 4.4    3.2-4.5  GM/DL


 


Influenza Type A (RT-PCR) Not Detected    Not Detecte  


 


Influenza Type B (RT-PCR) Not Detected    Not Detecte  


 


SARS-CoV-2 RNA (RT-PCR) Not Detected    Not Detecte  


 


Blood Gas Puncture Site  RT WRIST    


 


Blood Gas Patient Temperature  UNABLE    


 


Arterial Blood pH  7.05 *L  7.37-7.43  


 


Arterial Blood Partial


Pressure CO2 


 54 H


 


 35-45  MMHG





 


Arterial Blood Partial


Pressure O2 


 88 


 


 79-93  MMHG





 


Arterial Blood HCO3  15 *L  23-27  MMOL/L


 


Arterial Blood Total CO2


 


 16.6 L


 


 21.0-31.0


MMOL/L


 


Arterial Blood Oxygen


Saturation 


 91 L


 


   %





 


Arterial Blood Base Excess


 


 -15.5 L


 


 -2.5-2.5


MMOL/L


 


Hansel Test  UNABLE    


 


Blood Gas Ventilator Setting  NO    


 


Blood Gas Inspired Oxygen  6 L    


 


Urine Color   YELLOW   


 


Urine Clarity   CLEAR   


 


Urine pH   7.0  5-9  


 


Urine Specific Gravity   1.025 H 1.016-1.022  


 


Urine Protein   2+ H NEGATIVE  


 


Urine Glucose (UA)   NEGATIVE  NEGATIVE  


 


Urine Ketones   TRACE H NEGATIVE  


 


Urine Nitrite   NEGATIVE  NEGATIVE  


 


Urine Bilirubin   NEGATIVE  NEGATIVE  


 


Urine Urobilinogen   0.2  < = 1.0  MG/DL


 


Urine Leukocyte Esterase   NEGATIVE  NEGATIVE  


 


Urine RBC (Auto)   1+ H NEGATIVE  


 


Urine RBC   RARE   /HPF


 


Urine WBC   5-10 H  /HPF


 


Urine Squamous Epithelial


Cells 


 


 0-2 


  /HPF





 


Urine Crystals   NONE   /LPF


 


Urine Bacteria   TRACE   /HPF


 


Urine Casts   PRESENT   /LPF


 


Urine Hyaline Casts   RARE   /LPF


 


Urine Mucus   N   /LPF


 


Urine Culture Indicated   NO   








My Orders





Orders - LIBORIO FISCHER MD


Cbc With Automated Diff (23 14:42)


Magnesium (23 14:42)


Chest 1 View Ap/Pa Only (23 14:42)


Ekg Tracing (23 14:42)


Comprehensive Metabolic Panel (23 14:42)


Protime With Inr (23 14:42)


Partial Thromboplastin Time (23 14:42)


O2 (23 14:42)


Monitor-Rhythm Ecg Trace Only (23 14:42)


Ed Iv/Invasive Line Start (23 14:42)


Troponin I Fs (23 14:42)


Probnp Fs (23 14:42)


Arterial Blood Gas (23 14:43)


Covid 19 Inhouse Test (23 14:43)


Blood Culture (23 14:43)


Influenza A And B By Pcr (23 14:43)


Isolation Central Supply Req (23 14:43)


Lactic Acid Analyzer (23 14:43)


Albuterol/Ipra Inhalation Soln (Duoneb I (23 14:43)


Methylprednisolone Sod Succ (Solu-Medrol (23 14:43)


Svn Small Volume Nebulizer (23 14:43)


Bipap (Bilevel) Set Up (23 15:04)


Ns Iv 1000 Ml (Sodium Chloride 0.9%) (23 15:06)


Ceftriaxone 1 Gm Pre-Mix (Rocephin 1 Gm (23 15:06)


Azithromycin Injection (Zithromax Inject (23 15:06)


Ns Iv 1000 Ml (Sodium Chloride 0.9%) (23 15:45)


Ns Iv 500 Ml (Sodium Chloride 0.9%) (23 15:45)


Pizano Cath (23 15:46)


Ua Culture If Indicated (23 16:13)





Vital Signs/I&O











 23





 14:40 16:32 17:00


 


Temp 36.2 36.2 


 


Pulse 106 110 111


 


Resp 35 34 22


 


B/P (MAP) 175/76 (109) 130/72 136/84 (101)


 


Pulse Ox 100 100 78


 


O2 Delivery NIV Bilevel NIV Bilevel NIV Bilevel


 


O2 Flow Rate   100.00











Admisison Planning


May Need Admission (Planning):  14:40





Progress


Progress Note #1:  


Progress Note


Potential life-threatening conditions of pneumonia, acute on chronic respiratory

failure, COVID, influenza, myocardial infarction, CHF exacerbation, COPD 

exacerbation, sepsis, pulmonary embolism, pulmonary mass.





Evaluate by ordering electrocardiogram, placed on cardiac telemetry monitor, 

chest x-ray, ABG, CBC, chemistry, cardiac enzymes, coags, blood cultures with 

lactic acid. Continue on BiPap for shortness of breath and increased work of 

breathing. 


On initial cardiac telemetry monitor his heart rate was 105 and sinus 

tachycardia.  He had an oxygen saturation of 100% on the BiPAP with settings of 

12/5 with FiO2 of 40%.  Ordered a dose of steroids 125 mg IV x1 as well as a 

DuoNeb breathing treatment in line with his BiPAP.


Progress Note #2:  


   Time:  15:24


Progress Note


On my personal review and interpretation of his 1 view chest x-ray he has pul

monary infiltrate on the right perihilar area.  He has diffuse interstitial 

disease consistent with COPD.


On my personal review and interpretation of his electrocardiogram shows sinus 

tachycardia with occasional PVCs.  He had some nonspecific T wave abnormalities 

but the flipped T waves he had in 2022 were resolved.


His white blood cell count was slightly elevated to 13.9 with a mild left shift.

 As he was slightly improved with his BiPAP will continue this and plan on 

rechecking an ABG but his initial ABG acidosis with a pH of 7.05, mild elevation

of CO2 PCO2 of 50, PO2 88 on the 40% FiO2.


I checked with nursing supervisor to ensure that there was no ICU bed available 

at [place] before calling Dr. Shaikh for [facility] service as patient follows with 

LOLA Mane here at [facility]. Dr. Shaikh was in with a patient at clinic so I left a 

message with her staff to call me as soon as she got out of the room.





1533 I spoke with Dr. Shaikh and reviewed with her the presentation of the 

patient and briefly reviewed his past medical history.  As he was having 

findings for pneumonia with acidosis and respiratory distress for acute on 

chronic respiratory failure will continue BiPAP for now but may require 

intubation if his not improved by the time of his next ABG.  Continue with IV 

fluid for his sinus tachycardia and awaiting lactic acid as well as cardiac 

enzymes.  Swab to check for COVID and influenza was also still pending.  She 

requested I speak with cardiology if his enzymes were elevated.  Based on his 

findings for pneumonia on the chest x-ray and increased work of breathing will 

administer Rocephin 1 g IV as well as Zithromax 500 mg IV.  She was agreeable 

with ICU admission and request that I place bridge orders as she was seeing 

patients in the clinic.  She would check on him when he arrived in [place]. 





I reviewed radiologist report on his CXR showing Right middle lobe infiltrate 

with chronic COPD changes. 





1542 Lab called and advised the Lactic acid was elevated to 9.57 so I ordered 

additional IVF to get him up to 30 ml/kg IV fluid bolus with normal saline. 


Pizano catheter will be placed to monitor I and Os. For the admit bridge orders 

will use sepsis order set as well. Order ABG on arrival to ICU. Continue fluids 

at 150 mls/hr after the initial 30 ml/kg bolus and IV antibiotics. 





1602 Chemistry panel showed elevated proBNP of 8942 which is almost identical to

his level from 2022. Troponin I is less than 0.3. Renal function was ok.  

As he did not have acute elevation of his cardiac enzymes I did not discuss his 

case with cardiology.





1620 EMS here to take patient to [facility]. His IVF 30  ml/kg bolus was 

still infusing but his antibiotics were almost all in with only a small amount 

of Azithromycin left to infuse. Advised EMS they could give additional IVF of LR

at 150 ml/hr after the bolus had infused. He had to be changed to CPAP from 

BiPap for EMS transport. His blood pressure was 136/78 prior to leaving with 

EMS. His Covid and Influenza swabs were negative. 


He continues to show sinus tachycardia on his cardiac telemetry monitor but his 

heart rate had improved to about 100 or upper 90s sinus rhythm





1637 EMS paramedic called to say the patient had developed hypotension with 

systolic blood pressure of 60 and felt that he was having trouble keeping O2 up 

with the CPAP. He was checking to see what else might be helpful for patient. I 

advised him to start whatever vasopressor he had and give additional IVF bolus. 

He had Dopamine so will start this and continue IVF bolus beyond his initial 30 

ml/kg. 





1649 I spoke with the E-ICU attending to let her know of the patient coming to 

ICU 12 in [place]. With his sepsis and COPD as well as acute on chronic 

respiratory failure if he continues to jacky he would need intubation. I updated

her of what he presented with here in [place] and what we did with IV 

antibiotics and IV fluid support in addition to BiPap. She will watch for him to

arrive in [facility] and see what else can be done to assist with his care 

and treatment. 





1701 I updated Dr. Shaikh with [facility] about the patient having more difficulty in 

EMS transport and she plans on seeing him when he gets to the ICU. E-ICU will be

helping manage his care as well.


Initial ECG Impression Date:  2023


Initial ECG Impression Time:  14:51


Initial ECG Rate:  102


Initial ECG Rhythm:  S.Tach


Initial ECG Comparisson:  Changed (resolved flipped t waves from 2022)


Comment


On my personal review and interpretation of his electrocardiogram is sinus 

tachycardia with a heart rate of 102 bpm.  He has occasional premature complex

es.  AL interval 160 ms.  No acute ST elevation.  T wave flattening.  QT 

interval 327 ms with a QTc interval 386 ms.  Compared to prior tracing from 2022 his flipped T waves have improved.





Diagnostic Imaging





   Diagonstic Imaging:  Xray


   Plain Films/CT/US/NM/MRI:  chest


Comments


                 [facility].


                                [place], [place]





NAME:   JAS ROCK


MED REC#:   [id number]


ACCOUNT#:   [id number]


PT STATUS:   REG ER


:   1960


PHYSICIAN:   LIBORIO FISCHER MD


ADMIT DATE:   23/ER FS


                                   ***Draft***


Date of Exam:23





CHEST 1 VIEW AP/PA ONLY








INDICATION: Shortness breath.





TIME OF EXAM: 2:43 p.m.





Correlation is made with prior chest 2022.





FINDINGS: Heart size is normal. There is some patchy infiltrate


in the right mid lung field. Lungs are hyperinflated. There are


some generalized interstitial changes. No effusion is seen. There


is no pneumothorax.





IMPRESSION: COPD and interstitial changes. There is a patchy


infiltrate in the right mid lung field.





  Dictated on workstation # [id number]








Dict:   23 1500


Trans:   23 1502


 [id number]





Interpreted by:     JODY IRAHETA MD


Electronically signed by:


   Reviewed:  Reviewed by Me





Critical Care Note


Critical Care


Total Time (minutes)


60 minutes


Progress


60 minutes of critical care time was spent with the patient.  Time excludes 

separately billable procedures.  Time was spent obtaining history from review of

his hospital admission in 2022, discussion with EMS and his wife, ordering 

tests and reviewing results, ordering interventions and reviewing response, 

discussion with consultants, documentation in the chart.  Patient was at risk of

cardiorespiratory compromise and failure.  He required constant direct 

intervention and monitoring to manage his care and was at risk of cardiac 

arrest, further respiratory failure, death.





Departure


Communication (Admissions)


Time/Spoke to Admitting Phy:  15:33


d/w Dr. Shaikh for [facility] service. Reviewed initial presentation and inital exam and

vital signs with findings for pneumonia and likely sepsis with acute on chronic 

respiratory failure. started on Rocephin and zithromax by IV.  Have patient on 

BiPAP and his oxygen saturation has come up to 100%.  Since he is hypoxic and 

having acidosis we will plan on rechecking ABG after he has been on the BiPAP.  

Cardiac enzymes are also ordered and if he has a bump in his enzymes will 

contact cardiology for consult.  Placed in the ICU for closer monitoring and 

management of his airway. Giving 1 L NS IVF bolus initially and may add 

additional fluids depending on his blood pressure and lactic acid. Will write 

bridge orders for her since she is actively seeing patients in clinic currently.





Impression





   Primary Impression:  


   Sepsis


   Qualified Codes:  A41.9 - Sepsis, unspecified organism; R65.20 - Severe 

   sepsis without septic shock; J96.01 - Acute respiratory failure with hypoxia


   Additional Impressions:  


   Acute and chronic respiratory failure with hypoxia


   Acute exacerbation of COPD with asthma


   Pneumonia of right lung due to infectious organism


   Qualified Codes:  J18.9 - Pneumonia, unspecified organism


Disposition:  30 STILL A PATIENT


Condition:  Critical





Admissions


Decision to Admit Reason:  Admit from ER (General)


Decision to Admit/Date:  2023


Time/Decision to Admit Time:  15:33





Departure-Patient Inst.


Referrals:  


STEVE VASQUEZ (PCP)


Primary Care Physician








[facility] (Family)


Primary Care Physician











LIBORIO FISCHER MD               2023 14:50

## 2023-01-17 NOTE — DIAGNOSTIC IMAGING REPORT
INDICATION: Line placement.



TIME OF EXAM: 5:30 p.m.



COMPARISON: Correlation is made to prior study earlier same day.



FINDINGS: ET tube has been placed with its tip in good position

above the alia. NG tube passes into the stomach. The proximal

side-port is at the GE junction. A right IJ line has tip

overlying the SVC. There is no pneumothorax. There appears to be

some patchy infiltrates at the lung bases bilaterally. No

effusion is seen.



IMPRESSION:

1. Lines and catheters, as described. Note is made of patchy

bibasilar infiltrates.



Dictated by: 



  Dictated on workstation # [id number]

## 2023-01-17 NOTE — TELE-ICU CONSULT
History of Present Illness


History of Present Illness


Date Seen by Provider:  Jan 17, 2023


Time Seen by Provider:  17:27


History of Present Illness


(Tele-ICU Physician ,   consultation as per request of PCP 


Service provided via interactive audio and video telecommunications  E-CARE 

system to a patient admitted to ICU bed in [facility].








Available chart/ vitals / labs / Images reviewed


H&P is from ER notes


Patient's information available about PMH, Shx, Fhx   allergy reviewed inEMR.


ROS as per chart and RN report





Now in IC


Video assessment done using   teleICU camera, rest of exam as per RN


Discussed with RN.














A/P


Acute resp failure , hypoxic , hypercarbic


( AECOPD, CAP , ? CHF 


- in resp distress despite NIPPV - intubated on arrival to ICU  without 

complications by ER MD ( as per discussion with ER MD in [place] - full code 

as per wife ) 





Shock


- started on dopa in ambulance 


- will change to levo , reassess 








PNA suspected , CAP ,  RML 


- abx initiated , to cont 


( NEG flu , covid) 





Met acidosis with lactate > 9 


 - cont fluid resuscitation 





CAD


- s/p stenting  in past 


-EF reported 55% 2022








Lines :      , (Central Line Necessity Reviewed)


[place]:


OG:


Nutrition:


Analgesia:


Anxiety/ delirium








VTE Prophylaxis: 


Stress Ulcer Prophylaxis:





Plans in collaboration with   bedside consultants and IM MDs.


Discussed with RN to reach out if any questions or concerns


A total of33 minutes of critical care time was devoted to this patient today, 

required to treat and/or prevent further deterioration of  critical care 

condition ( as above ) .





I am remotely monitoring this patient from another state.  I am unable to do the

bedside exam, and history/physical and pertinent information is taken from other

notes in the computer and bedside staff. .





Allergies and Home Medications


Allergies


Coded Allergies:  


     No Known Drug Allergies (Unverified , 5/30/22)





Home Medications


Amlodipine Besylate 5 Mg Tablet, 5 MG PO DAILY


   Prescribed by: MELL SHAIKH on 6/10/22 0917


Aspirin 81 Mg Tab.chew, 81 MG PO DAILY@0900


   Prescribed by: MELL SHAIKH on 6/10/22 0917


Atorvastatin Calcium 20 Mg Tablet, 20 MG PO HS


   Prescribed by: MELL SHAIKH on 6/10/22 0917


Clopidogrel Bisulfate 75 Mg Tablet, 75 MG PO DAILY


   Prescribed by: MELL SHAIKH on 6/10/22 0917


Ipratropium/Albuterol Sulfate 0.5 Mg-3 Mg (2.5 Mg Base)/3 Ml Ampul.neb, 3 ML IH 

Q6H PRN for SHORTNESS OF BREATH


   Prescribed by: MELL SHAIKH on 6/10/22 0917


Lisinopril 20 Mg Tablet, 20 MG PO DAILY@0900


   Prescribed by: MELL SHAIKH on 6/10/22 0917


Metoprolol Succinate 50 Mg Tab.er.24h, 50 MG PO DAILY


   Prescribed by: MELL SHAIKH on 6/10/22 0917


Pantoprazole Sodium 40 Mg Tablet.dr, 40 MG PO DAILY


   Prescribed by: MELL SHAIKH on 6/10/22 0917


Prednisone 20 Mg Tab, 20 MG PO DAILY@0700


   1 tab x 3 days then 1/2 tab x4 days then stop 


   Prescribed by: MELL SHAIKH on 6/10/22 0917


Thiamine HCl 100 Mg Tablet, 100 MG PO DAILY@0700


   Prescribed by: MELL SHAIKH on 6/10/22 0917





Past Medical/Social/Family Hx


Patient Social History


Smoking Status:  Unknown if Ever Smoked


Substance use?:  Unable to obtain


Alcohol Use?:  Unable to obtain





Immunizations Up To Date


First/Initial COVID19 Vaccinat:  7/27/21


Second COVID19 Vaccination Jeyson:  7/27/21


Tetanus Booster (TDap):  Unknown


Hepatitis A:  No


Hepatitis B:  No


TB Skin Test:  None





Current Status


Advance Directives:  No


Primary Language:  English


Preferred Spoken Language:  English





Review of Systems


Constitutional:  see HPI





Focused Exam


Lactate Level


1/17/23 15:00: Lactic Acid Level 9.57*H


Height, Weight, BMI


Height: '"


Weight: lbs. oz. kg; 24.67 BMI


Method:


Lactic Acid Level





Laboratory Tests








Test


 1/17/23


15:00


 


Lactic Acid Level


 9.57 MMOL/L


(0.50-2.00)  *H











Exam


Exam


Patient acknowledged, consented, and participated in this virtual visit which 

was conducted using real time audio/video


Vital Signs








  Date Time  Temp Pulse Resp B/P (MAP) Pulse Ox O2 Delivery O2 Flow Rate FiO2


 


1/17/23 16:32 36.2 110 34 130/72 100 NIV Bilevel  


 


1/17/23 14:40 36.2 106 35 175/76 (109) 100 NIV Bilevel  








Height & Weight


Height: '"


Weight: lbs. oz. kg; 24.67 BMI


Method:


General Appearance:  Moderate Distress


Capillary Refill:  Less Than 3 Seconds





Results


Lab


Laboratory Tests


1/17/23 15:00











Assessment/Plan


Assessment/Plan


1











TON MOHAMUD MD         Jan 17, 2023 17:30

## 2023-01-17 NOTE — DIAGNOSTIC IMAGING REPORT
INDICATION: Shortness breath.



TIME OF EXAM: 2:43 p.m.



Correlation is made with prior chest 06/06/2022.



FINDINGS: Heart size is normal. There is some patchy infiltrate

in the right mid lung field. Lungs are hyperinflated. There are

some generalized interstitial changes. No effusion is seen. There

is no pneumothorax.



IMPRESSION: COPD and interstitial changes. There is a patchy

infiltrate in the right mid lung field.



Dictated by: 



  Dictated on workstation # [id number]

## 2023-01-17 NOTE — PROGRESS NOTE
Standard Progress Note


Progress Notes/Assess & Plan


Date Seen by a Provider:  Jan 17, 2023


Time Seen by a Provider:  21:45


Progress/Assessment & Plan


Called by house supervisor to start A-Line on pt sedated on vent who was on low 

dose pressors.  Multiple attempts on b/l radials as well as left brachial were 

unsuccessful using the U/S.  Very faint left radial was palpable but was only 

able to obtain a couple very small flashes in Arrow catheter that did not look 

arterial and was unable to advance catheter.  I advised RN another provider 

would attempt in am.





Focused Exam


Lactate Level


1/17/23 17:52: Lactic Acid Level 4.37*H


1/17/23 19:40: Lactic Acid Level 3.19*H


1/17/23 22:10: Lactic Acid Level 1.93





Time of Focused Exam:  16:06


Lactic Acid Level





Laboratory Tests








Test


 1/17/23


19:40 1/17/23


22:10


 


Lactic Acid Level


 3.19 MMOL/L


(0.50-2.00)  *H 1.93 MMOL/L


(0.50-2.00)

















RL ANTHONY CRNA           Jan 17, 2023 22:32

## 2023-01-18 VITALS — DIASTOLIC BLOOD PRESSURE: 49 MMHG | SYSTOLIC BLOOD PRESSURE: 109 MMHG

## 2023-01-18 VITALS — DIASTOLIC BLOOD PRESSURE: 56 MMHG | SYSTOLIC BLOOD PRESSURE: 106 MMHG

## 2023-01-18 VITALS — DIASTOLIC BLOOD PRESSURE: 51 MMHG | SYSTOLIC BLOOD PRESSURE: 118 MMHG

## 2023-01-18 VITALS — SYSTOLIC BLOOD PRESSURE: 105 MMHG | DIASTOLIC BLOOD PRESSURE: 53 MMHG

## 2023-01-18 VITALS — SYSTOLIC BLOOD PRESSURE: 107 MMHG | DIASTOLIC BLOOD PRESSURE: 58 MMHG

## 2023-01-18 VITALS — SYSTOLIC BLOOD PRESSURE: 122 MMHG | DIASTOLIC BLOOD PRESSURE: 50 MMHG

## 2023-01-18 LAB
ALBUMIN SERPL-MCNC: 3.2 GM/DL (ref 3.2–4.5)
ALP SERPL-CCNC: 76 U/L (ref 40–136)
ALT SERPL-CCNC: 50 U/L (ref 0–55)
BASE EXCESS STD BLDA CALC-SCNC: -5.1 MMOL/L (ref -2.5–2.5)
BASOPHILS # BLD AUTO: 0 10^3/UL (ref 0–0.1)
BASOPHILS NFR BLD AUTO: 0 % (ref 0–10)
BDY SITE: (no result)
BILIRUB SERPL-MCNC: 0.5 MG/DL (ref 0.1–1)
BODY TEMPERATURE: 36.8
BUN/CREAT SERPL: 10
CALCIUM SERPL-MCNC: 7.6 MG/DL (ref 8.5–10.1)
CHLORIDE SERPL-SCNC: 105 MMOL/L (ref 98–107)
CO2 BLDA CALC-SCNC: 18.8 MMOL/L (ref 21–31)
CO2 SERPL-SCNC: 18 MMOL/L (ref 21–32)
CREAT SERPL-MCNC: 0.68 MG/DL (ref 0.6–1.3)
EOSINOPHIL # BLD AUTO: 0 10^3/UL (ref 0–0.3)
EOSINOPHIL NFR BLD AUTO: 0 % (ref 0–10)
GFR SERPLBLD BASED ON 1.73 SQ M-ARVRAT: 106 ML/MIN
GLUCOSE SERPL-MCNC: 180 MG/DL (ref 70–105)
HCT VFR BLD CALC: 37 % (ref 40–54)
HGB BLD-MCNC: 12.7 G/DL (ref 13.3–17.7)
INHALED O2 FLOW RATE: (no result) L/MIN
LYMPHOCYTES # BLD AUTO: 0.5 10^3/UL (ref 1–4)
LYMPHOCYTES NFR BLD AUTO: 5 % (ref 12–44)
MAGNESIUM SERPL-MCNC: 1.3 MG/DL (ref 1.6–2.4)
MANUAL DIFFERENTIAL PERFORMED BLD QL: YES
MCH RBC QN AUTO: 33 PG (ref 25–34)
MCHC RBC AUTO-ENTMCNC: 35 G/DL (ref 32–36)
MCV RBC AUTO: 94 FL (ref 80–99)
MONOCYTES # BLD AUTO: 0.3 10^3/UL (ref 0–1)
MONOCYTES NFR BLD AUTO: 4 % (ref 0–12)
MONOCYTES NFR BLD: 2 %
NEUTROPHILS # BLD AUTO: 7.8 10^3/UL (ref 1.8–7.8)
NEUTROPHILS NFR BLD AUTO: 90 % (ref 42–75)
NEUTS BAND NFR BLD MANUAL: 93 %
PCO2 BLDA: 25 MMHG (ref 35–45)
PH BLDA: 7.47 [PH] (ref 7.37–7.43)
PHOSPHATE SERPL-MCNC: 2.9 MG/DL (ref 2.3–4.7)
PLATELET # BLD: 139 10^3/UL (ref 130–400)
PMV BLD AUTO: 10.2 FL (ref 9–12.2)
PO2 BLDA: 163 MMHG (ref 79–93)
POTASSIUM SERPL-SCNC: 4.1 MMOL/L (ref 3.6–5)
PROT SERPL-MCNC: 6 GM/DL (ref 6.4–8.2)
SAO2 % BLDA FROM PO2: 100 % (ref 94–100)
SODIUM SERPL-SCNC: 134 MMOL/L (ref 135–145)
VARIANT LYMPHS NFR BLD MANUAL: 5 %
VENTILATION MODE VENT: YES
WBC # BLD AUTO: 8.6 10^3/UL (ref 4.3–11)

## 2023-01-18 RX ADMIN — IPRATROPIUM BROMIDE AND ALBUTEROL SULFATE SCH ML: .5; 3 SOLUTION RESPIRATORY (INHALATION) at 14:53

## 2023-01-18 RX ADMIN — POTASSIUM CHLORIDE SCH MLS/HR: 200 INJECTION, SOLUTION INTRAVENOUS at 05:06

## 2023-01-18 RX ADMIN — VASOPRESSIN SCH MLS/HR: 20 INJECTION INTRAVENOUS at 04:20

## 2023-01-18 RX ADMIN — IPRATROPIUM BROMIDE AND ALBUTEROL SULFATE SCH ML: .5; 3 SOLUTION RESPIRATORY (INHALATION) at 10:11

## 2023-01-18 RX ADMIN — VANCOMYCIN HYDROCHLORIDE SCH MLS/HR: 500 INJECTION, POWDER, LYOPHILIZED, FOR SOLUTION INTRAVENOUS at 17:40

## 2023-01-18 RX ADMIN — PROPOFOL SCH MLS/HR: 10 INJECTION, EMULSION INTRAVENOUS at 03:24

## 2023-01-18 RX ADMIN — Medication SCH MLS/HR: at 09:00

## 2023-01-18 RX ADMIN — METHYLPREDNISOLONE SODIUM SUCCINATE SCH MG: 40 INJECTION, POWDER, FOR SOLUTION INTRAMUSCULAR; INTRAVENOUS at 12:09

## 2023-01-18 RX ADMIN — PROPOFOL SCH MLS/HR: 10 INJECTION, EMULSION INTRAVENOUS at 13:28

## 2023-01-18 RX ADMIN — PROPOFOL SCH MLS/HR: 10 INJECTION, EMULSION INTRAVENOUS at 19:00

## 2023-01-18 RX ADMIN — SODIUM CHLORIDE SCH MLS/HR: 900 INJECTION INTRAVENOUS at 00:09

## 2023-01-18 RX ADMIN — SODIUM CHLORIDE SCH MLS/HR: 900 INJECTION, SOLUTION INTRAVENOUS at 14:22

## 2023-01-18 RX ADMIN — POTASSIUM CHLORIDE SCH MEQ: 1500 TABLET, EXTENDED RELEASE ORAL at 05:06

## 2023-01-18 RX ADMIN — VASOPRESSIN SCH MLS/HR: 20 INJECTION INTRAVENOUS at 16:00

## 2023-01-18 RX ADMIN — IPRATROPIUM BROMIDE AND ALBUTEROL SULFATE SCH ML: .5; 3 SOLUTION RESPIRATORY (INHALATION) at 07:40

## 2023-01-18 RX ADMIN — Medication SCH MLS/HR: at 13:28

## 2023-01-18 RX ADMIN — IPRATROPIUM BROMIDE AND ALBUTEROL SULFATE SCH ML: .5; 3 SOLUTION RESPIRATORY (INHALATION) at 18:36

## 2023-01-18 RX ADMIN — MAGNESIUM SULFATE IN DEXTROSE SCH MLS/HR: 10 INJECTION, SOLUTION INTRAVENOUS at 05:06

## 2023-01-18 RX ADMIN — METHYLPREDNISOLONE SODIUM SUCCINATE SCH MG: 40 INJECTION, POWDER, FOR SOLUTION INTRAMUSCULAR; INTRAVENOUS at 17:40

## 2023-01-18 RX ADMIN — PROPOFOL SCH MLS/HR: 10 INJECTION, EMULSION INTRAVENOUS at 23:54

## 2023-01-18 RX ADMIN — PANTOPRAZOLE SODIUM SCH MG: 40 INJECTION, POWDER, FOR SOLUTION INTRAVENOUS at 08:43

## 2023-01-18 RX ADMIN — MAGNESIUM SULFATE IN DEXTROSE SCH MLS/HR: 10 INJECTION, SOLUTION INTRAVENOUS at 05:12

## 2023-01-18 RX ADMIN — CEFTRIAXONE SCH MLS/HR: 1 INJECTION, SOLUTION INTRAVENOUS at 14:50

## 2023-01-18 RX ADMIN — METHYLPREDNISOLONE SODIUM SUCCINATE SCH MG: 40 INJECTION, POWDER, FOR SOLUTION INTRAMUSCULAR; INTRAVENOUS at 23:55

## 2023-01-18 RX ADMIN — IPRATROPIUM BROMIDE AND ALBUTEROL SULFATE SCH ML: .5; 3 SOLUTION RESPIRATORY (INHALATION) at 22:17

## 2023-01-18 RX ADMIN — IPRATROPIUM BROMIDE AND ALBUTEROL SULFATE SCH ML: .5; 3 SOLUTION RESPIRATORY (INHALATION) at 02:46

## 2023-01-18 RX ADMIN — MAGNESIUM SULFATE IN DEXTROSE SCH MLS/HR: 10 INJECTION, SOLUTION INTRAVENOUS at 06:59

## 2023-01-18 RX ADMIN — VANCOMYCIN HYDROCHLORIDE SCH MLS/HR: 500 INJECTION, POWDER, LYOPHILIZED, FOR SOLUTION INTRAVENOUS at 05:13

## 2023-01-18 RX ADMIN — PROPOFOL SCH MLS/HR: 10 INJECTION, EMULSION INTRAVENOUS at 09:01

## 2023-01-18 RX ADMIN — METHYLPREDNISOLONE SODIUM SUCCINATE SCH MG: 40 INJECTION, POWDER, FOR SOLUTION INTRAMUSCULAR; INTRAVENOUS at 00:09

## 2023-01-18 RX ADMIN — SODIUM CHLORIDE SCH MLS/HR: 900 INJECTION, SOLUTION INTRAVENOUS at 15:23

## 2023-01-18 RX ADMIN — SODIUM CHLORIDE SCH MLS/HR: 900 INJECTION, SOLUTION INTRAVENOUS at 03:24

## 2023-01-18 RX ADMIN — METHYLPREDNISOLONE SODIUM SUCCINATE SCH MG: 40 INJECTION, POWDER, FOR SOLUTION INTRAMUSCULAR; INTRAVENOUS at 05:12

## 2023-01-18 RX ADMIN — SODIUM CHLORIDE SCH MLS/HR: 900 INJECTION INTRAVENOUS at 05:12

## 2023-01-18 RX ADMIN — SODIUM CHLORIDE SCH MLS/HR: 900 INJECTION, SOLUTION INTRAVENOUS at 23:55

## 2023-01-18 NOTE — HISTORY & PHYSICAL
ROJAS HICKS 23 1325:


History of Present Illness


History of Present Illness


Reason for visit/HPI


Mr. Gamez is a 61 y/o male with a PMHx of CAD with stents, PVD with stents, 

HTN, COPD, CHF, stroke with R-sided hemiparesis, and HLD who presented to the 

[place] ED with SOA on , onset earlier that day. Patient was exhibiting 

signs of acute respiratory failure with hypoxia. Patient was transported via EMS

to [facility]. Due to continued decline in respiratory status despite CPAP 

therapy, the patient was admitted to the ICU and intubated shortly after 

arrival. 





: The patient is intubated and sedated. 


Vent settings: 500/26/6 with FiO2 of 30%


AB.47/25/163, HCO3 18


NG tube in place in left nare, confirmed in stomach with XRAY


ET in place, confirmed with CXR


Urinary catheter in place, urine output closely monitored


SCDs and boots in place for LE off-loading


Date of Admission


2023 at 17:06


Date Seen by a Provider:  2023


Time Seen by a Provider:  08:00


I consulted on this patient on


23


 13:16


Attending Physician


Lilliam Little


Admitting Physician


Admitting Physician:


Mell Shaikh MD 








Attending Physician:


Mell Shaikh MD


Consult








Allergies and Home Medications


Allergies


Coded Allergies:  


     No Known Drug Allergies (Unverified , 22)





Patient Home Medication List


Home Medication List Reviewed:  Yes


Amlodipine Besylate (Amlodipine Besylate) 5 Mg Tablet, 5 MG PO DAILY


   Prescribed by: MELL SHAIKH on 6/10/22 0917


Aspirin (Children's Aspirin) 81 Mg Tab.chew, 81 MG PO DAILY@0900


   Prescribed by: MELL SHAIKH on 6/10/22 0917


Atorvastatin Calcium (Atorvastatin Calcium) 20 Mg Tablet, 20 MG PO HS


   Prescribed by: MELL SHAIKH on 6/10/22 0917


Clopidogrel Bisulfate (Clopidogrel) 75 Mg Tablet, 75 MG PO DAILY


   Prescribed by: MELL SHAIKH on 6/10/22 0917


Ipratropium/Albuterol Sulfate (Iprat-Albut 0.5-3(2.5) mg/3 ml) 0.5 Mg-3 Mg (2.5 

Mg Base)/3 Ml Ampul.neb, 3 ML IH Q6H PRN for SHORTNESS OF BREATH


   Prescribed by: MELL SHAIKH on 6/10/22 0917


Lisinopril (Lisinopril) 20 Mg Tablet, 20 MG PO DAILY@0900


   Prescribed by: MELL SHAIKH on 6/10/22 0917


Metoprolol Succinate (Metoprolol Succinate) 50 Mg Tab.er.24h, 50 MG PO DAILY


   Prescribed by: MELL SHAIKH on 6/10/22 0917


Pantoprazole Sodium (Pantoprazole Sodium) 40 Mg Tablet.dr, 40 MG PO DAILY


   Prescribed by: MELL SHAIKH on 6/10/22 0917


Prednisone (Prednisone) 20 Mg Tab, 20 MG PO DAILY@0700


   Prescribed by: MELL SHAIKH on 6/10/22 0917


Thiamine HCl (Vitamin B-1) 100 Mg Tablet, 100 MG PO DAILY@0700


   Prescribed by: MELL SHAIKH on 6/10/22 0917





Past Medical-Social-Family Hx


Patient Social History


Tobacco Use?:  Yes


Tobacco type used:  Cigarettes


Smoking Status:  Current Everyday Smoker


Substance use?:  No


Alcohol Use?:  Yes


Alcohol type:  Beer


Alcohol Frequency:  Daily


Pt feels they are or have been:  No





Immunizations Up To Date


First/Initial COVID19 Vaccinat:  21


Second COVID19 Vaccination Jeyson:  21


Tetanus Booster (TDap):  Unknown


Hepatitis A:  No


Hepatitis B:  No





Current Status


Advance Directives:  No


Communicates:  Unable To Communicate


Primary Language:  English


Preferred Spoken Language:  English





Past Medical History


Surgeries:  Coronary Stent, Vascular Surgery


Pneumonia, COPD


High Cholesterol, Hypertension


Stroke





Review of Systems


ROS-Unable to Obtain:  Unable to obtain





Physical Exam


Vital Signs





Vital Signs - First Documented








 23





 14:40 17:00 17:15


 


Temp 36.2  


 


Pulse 106  


 


Resp 35  


 


B/P (MAP) 175/76 (109)  


 


Pulse Ox 100  


 


O2 Delivery NIV Bilevel  


 


O2 Flow Rate  100.00 


 


FiO2   100





Capillary Refill : Greater Than 3 Seconds


Height, Weight, BMI


Height: '"


Weight: lbs. oz. kg; 25.24 BMI


Method:


General Appearance:  Other (intubated and sedated)


Respiratory:  Other (mildly coarse breath sounds bilaterally, no wheezing or 

crackles. Good air movement bilaterally)


Cardiovascular:  Regular Rate, Rhythm, Other (trace bilateral LE edema)


Gastrointestinal:  Normal Bowel Sounds, Soft


Skin:  Normal Color, Warm/Dry





Assessment/Plan


Assessment and Plan


Problems:  


(1) Stroke with right hemiparesis


Status:  Chronic


Assessment & Plan:  -Resume home medications: ASA 81mg qd, atorvastatin 20mg qd


-PT/OT services for rehabilitation 





(2) Hyperlipidemia


Status:  Chronic


Assessment & Plan:  -Resume home medication atorvastatin 20mg qd





(3) Hypertension


Status:  Chronic


Assessment & Plan:  Monitor BP


Consider resuming home medications amlodipine 5mg qd, lisinopril 20mg qd, 

metoprolol 50mg qd if BP >140/90





(4) Coronary artery disease


Status:  Chronic


Assessment & Plan:  Resume home medications: ASA 81mg qd, atorvastatin 20mg qd, 

and metoprolol 50mg if BP and HR will tolerate





(5) Peripheral vascular disease


Status:  Chronic


Assessment & Plan:  -Stent place in R LE in the past month at [place] per 

patient report. Continue home medications: Plavix 75mg qd





(6) Acute respiratory failure with hypoxia


Status:  Acute


Assessment & Plan:  : 


Acute respiratory failure with hypoxia likely secondary to AECOPD. Patient is 

intubated and sedated. Appreciate plan per Critical Care. Spontaneous breathing 

trial this evening. Potential extubation on .


-Antibiotic therapy: vancomycin 1250mg x3days with trough, Rocephin 1gm x5days, 

azithromycin 250mg x5days 


-Systemic steroids: solumedrol 40mg IV qd


-Duoneb 3ml RTQ4H INH


-PT/OT


-Protonix 40mg IV qd for stress ulcer prophylaxis





(7) Acute exacerbation of chronic obstructive pulmonary disease (COPD)


Status:  Acute


Assessment & Plan:  : 


Acute respiratory failure with hypoxia likely secondary to AECOPD. Patient is 

intubated and sedated. Appreciate plan per Critical Care. Spontaneous breathing 

trial this evening. Potential extubation on .


-Antibiotic therapy: vancomycin 1250mg x3days with trough, Rocephin 1gm x5days, 

azithromycin 250mg x5days 


-Systemic steroids: solumedrol 40mg IV qd


-Duoneb 3ml RTQ4H INH


-PT/OT





(8) Hypomagnesemia


Status:  Acute


Assessment & Plan:  : Mg 1.3. Replace with Magnesium sulfate 2gm. Monitor 

with AM Mg. Continue to replace if Mg <1.7








Admission Diagnosis


Admission Status:  Inpatient Order (span 2 midnights)


Reason for Inpatient Admission:  


Acute respiratory failure with hypoxia secondary to AECOPD





MELL SHAIKH MD 23 1424:


Allergies and Home Medications


Allergies


Coded Allergies:  


     No Known Drug Allergies (Unverified , 22)





Patient Home Medication List


Home Medication List Reviewed:  Yes


Amlodipine Besylate (Amlodipine Besylate) 5 Mg Tablet, 5 MG PO DAILY


   Prescribed by: MELL SHAIKH on 6/10/22 0917


Aspirin (Children's Aspirin) 81 Mg Tab.chew, 81 MG PO DAILY@0900


   Prescribed by: MELL SHAIKH on 6/10/22 0917


Atorvastatin Calcium (Atorvastatin Calcium) 20 Mg Tablet, 20 MG PO HS


   Prescribed by: MELL SHAIKH on 6/10/22 0917


Clopidogrel Bisulfate (Clopidogrel) 75 Mg Tablet, 75 MG PO DAILY


   Prescribed by: MELL SHAIKH on 6/10/22 0917


Ipratropium/Albuterol Sulfate (Iprat-Albut 0.5-3(2.5) mg/3 ml) 0.5 Mg-3 Mg (2.5 

Mg Base)/3 Ml Ampul.neb, 3 ML IH Q6H PRN for SHORTNESS OF BREATH


   Prescribed by: MELL SHAIKH on 6/10/22 0917


Lisinopril (Lisinopril) 20 Mg Tablet, 20 MG PO DAILY@0900


   Prescribed by: MELL SHAIKH on 6/10/22 0917


Metoprolol Succinate (Metoprolol Succinate) 50 Mg Tab.er.24h, 50 MG PO DAILY


   Prescribed by: MELL SHAIKH on 6/10/22 0917


Pantoprazole Sodium (Pantoprazole Sodium) 40 Mg Tablet.dr, 40 MG PO DAILY


   Prescribed by: MELL SHAIKH on 6/10/22 0917


Prednisone (Prednisone) 20 Mg Tab, 20 MG PO DAILY@0700


   Prescribed by: MELL SHAIKH on 6/10/22 0917


Thiamine HCl (Vitamin B-1) 100 Mg Tablet, 100 MG PO DAILY@0700


   Prescribed by: MELL SHAIKH on 6/10/22 0917





Review of Systems


Constitutional:  other (Intubated and Sedated)





Physical Exam


General Appearance:  Other (intubated and sedated)


Respiratory:  Chest Non Tender; No Wheezing; Other (mildly coarse breath sounds 

bilaterally, no wheezing or crackles. Good air movement bilaterally)


Cardiovascular:  Regular Rate, Rhythm, No Edema, No Murmur


Gastrointestinal:  Normal Bowel Sounds, Soft; No Distended


Extremity:  No Pedal Edema


Skin:  Normal Color, Warm/Dry





Assessment/Plan


Assessment and Plan


Problems:  


(1) Acute respiratory failure with hypoxia


Status:  Acute


Assessment & Plan:  : 


Acute respiratory failure with hypoxia likely secondary to AECOPD. Patient is 

intubated and sedated. Appreciate plan per Critical Care. Spontaneous breathing 

trial this evening. Potential extubation on .


-Antibiotic therapy: vancomycin 1250mg x3days with trough, Rocephin 1gm x5days, 

azithromycin 250mg x5days 


-Systemic steroids: solumedrol 40mg IV qd


-Duoneb 3ml RTQ4H INH


-PT/OT


-Protonix 40mg IV qd for stress ulcer prophylaxis





(2) Acute exacerbation of chronic obstructive pulmonary disease (COPD)


Status:  Acute


Assessment & Plan:  : 


Acute respiratory failure with hypoxia likely secondary to AECOPD. Patient is 

intubated and sedated. Appreciate plan per Critical Care. Spontaneous breathing 

trial this evening. Potential extubation on .


-Antibiotic therapy: vancomycin 1250mg x3days with trough, Rocephin 1gm x5days, 

azithromycin 250mg x5days 


-Systemic steroids: solumedrol 40mg IV qd


-Duoneb 3ml RTQ4H INH


-PT/OT





(3) Hypertension


Status:  Chronic


Assessment & Plan:  Monitor BP


Consider resuming home medications amlodipine 5mg qd, lisinopril 20mg qd, 

metoprolol 50mg qd if BP >140/90





(4) Coronary artery disease


Status:  Chronic


Assessment & Plan:  Resume home medications: ASA 81mg qd, atorvastatin 20mg qd, 

and metoprolol 50mg if BP and HR will tolerate





(5) Hyperlipidemia


Status:  Chronic


Assessment & Plan:  -Resume home medication atorvastatin 20mg qd





(6) Stroke with right hemiparesis


Status:  Chronic


Assessment & Plan:  -Resume home medications: ASA 81mg qd, atorvastatin 20mg qd


-PT/OT services for rehabilitation 





(7) Peripheral vascular disease


Status:  Chronic


Assessment & Plan:  -Stent place in R LE in the past month at [place] per 

patient report. Continue home medications: Plavix 75mg qd





(8) Hypomagnesemia


Status:  Acute


Assessment & Plan:  : Mg 1.3. Replace with Magnesium sulfate 2gm. Monitor 

with AM Mg. Continue to replace if Mg <1.7








Admission Diagnosis


Admission Status:  Inpatient Order (span 2 midnights)


Reason for Inpatient Admission:  


Intubated and requiring ICU care





Supervisory-Addendum Brief


Verification & Attestation


Participated in pt care:  history, physical


Personally performed:  exam, history


Care discussed with:  Medical Student


Procedures:  n/a


Verification and Attestation of Medical Student E/M Service





A medical student performed and documented this service in my presence. I 

reviewed and verified all information documented by the medical student and made

modifications to such information, when appropriate. I personally performed the 

physical exam and medical decision making. 





 Mell Shaikh, 2023,14:21


  


63 yo M that presented with shortness of breath that was placed on bipap in 

[facility], patient was then transferred down and had worsening and decompensation in 

ambulance that lead him to be intubated upon arrival in [place].





Acute on Chronic Respiratory failure with hypoxia


Septic Shock


AECOPD


PNA


Metabolic Acidosis


Lactic Acidosis


CAD





- Patient intubated and sedated, eICU managing, SBT today


- IV antibiotics and steroids


- IVFs with good UOP


- Off pressers this AM, will continue to monitor


- Labs improving


- Continue ICU care











ROJAS HICKS               2023 13:25


MELL SHAIKH MD               2023 14:24

## 2023-01-18 NOTE — TELE-ICU PROGRESS NOTE
Subjective


Date Seen by a Provider:  Jan 18, 2023


Time Seen by a Provider:  07:54


Subjective/Events-last exam


(Tele-ICU Physician ,   consultation as per request of PCP 


Service provided via interactive audio and video telecommunications  E-CARE 

system to a patient admitted to ICU bed in [facility].








Available chart/ vitals / labs / Images reviewed


H&P is from ER notes


Patient's information available about PMH, Shx, Fhx   allergy reviewed inEMR.


ROS as per chart and RN report





Now in IC


Video assessment done using   teleICU camera, rest of exam as per RN


Discussed with RN.








63 yo M admitted for AECOPD, also CAP


was intubated for resp failure, current vent settings AC 26, Vt 550, FiO2 30% On

IV propofol @ 40 and IV Fentnyl @ 75, RASS at -2 to -3


Will lower sedation and try on SBT, has to be suctioned infrequently, has good 

cough


Started on IV azithromycin, Rocephin, no + sputum cultures


CXR from yesterday shows ET and NG in good position, patchy infiltrates at 

bases, mild hyperinflation





Sepsis Event


Evaluation


Height, Weight, BMI


Height: '"


Weight: lbs. oz. kg; 25.24 BMI


Method:





Focused Exam


Lactate Level


1/17/23 17:52: Lactic Acid Level 4.37*H


1/17/23 19:40: Lactic Acid Level 3.19*H


1/17/23 22:10: Lactic Acid Level 1.93


Time of Focused Exam:  16:06





Exam


Exam


Patient acknowledged, consented, and participated in this virtual visit which 

was conducted using real time audio/video


Vital Signs








  Date Time  Temp Pulse Resp B/P (MAP) Pulse Ox O2 Delivery O2 Flow Rate FiO2


 


1/18/23 07:44 37.1       


 


1/18/23 07:40  78 26  100   30


 


1/18/23 07:19  78      


 


1/18/23 06:01  77 26 112/54 (73) 100 Mechanical Ventilator 30.00 


 


1/18/23 05:28   26   Mechanical Ventilator 30.00 


 


1/18/23 05:01  82 26 120/60 (80) 100 Mechanical Ventilator 40.00 


 


1/18/23 04:20  80  119/57    


 


1/18/23 04:01  82 26 127/59 (81) 100 Mechanical Ventilator 40.00 


 


1/18/23 04:00        40


 


1/18/23 04:00     100 Mechanical Ventilator  40


 


1/18/23 03:24  80  119/57    


 


1/18/23 03:22 36.7       


 


1/18/23 03:11 36.8  26   Mechanical Ventilator 40.00 


 


1/18/23 03:09  80 26 119/57 (77) 100 Mechanical Ventilator 40.00 


 


1/18/23 02:46  77 26  100   40


 


1/18/23 02:09  77 26 95/55 (68) 100 Mechanical Ventilator 40.00 


 


1/18/23 01:30  80  114/65    


 


1/18/23 01:09  79 26 105/55 (72) 100 Mechanical Ventilator 40.00 


 


1/18/23 01:00  77      


 


1/18/23 00:25   26   Mechanical Ventilator 40.00 


 


1/18/23 00:09  80 28 114/65 (81) 100 Mechanical Ventilator 50.00 


 


1/18/23 00:00        50


 


1/18/23 00:00     100 Mechanical Ventilator  50


 


1/17/23 23:56 36.6       


 


1/17/23 23:18 36.4  28   Mechanical Ventilator 50.00 


 


1/17/23 23:09  80 28 123/59 (80) 100 Mechanical Ventilator 50.00 


 


1/17/23 22:30  76  99/51    


 


1/17/23 22:25  76 28  100   50


 


1/17/23 22:09  78 28 99/51 (67) 100 Mechanical Ventilator 50.00 


 


1/17/23 22:00        50


 


1/17/23 21:54  82 28 116/69 (85) 100 Mechanical Ventilator 50.00 


 


1/17/23 21:35  76  99/51    


 


1/17/23 21:22  90  113/87    


 


1/17/23 21:09  79 28 95/56 (69) 100 Mechanical Ventilator 50.00 


 


1/17/23 20:36   28   Mechanical Ventilator 50.00 


 


1/17/23 20:09  91 25 113/73 (86) 100 Mechanical Ventilator 60.00 


 


1/17/23 20:05   25   Mechanical Ventilator 60.00 


 


1/17/23 20:00        60


 


1/17/23 20:00     100 Mechanical Ventilator  60


 


1/17/23 19:31 36.1       


 


1/17/23 19:25  90 26  100   70


 


1/17/23 19:06  91 25 120/87 (98) 100 Mechanical Ventilator 70.00 


 


1/17/23 19:00  92      


 


1/17/23 19:00 36.4  25   Mechanical Ventilator 70.00 


 


1/17/23 18:30  90  113/87    


 


1/17/23 18:19        90


 


1/17/23 18:00  85 16 87/63 (71) 94 Mechanical Ventilator 100.00 


 


1/17/23 17:57  85  80/54    


 


1/17/23 17:51      Mechanical Ventilator 90.00 


 


1/17/23 17:45  94 29 80/54 (63) 98 Mechanical Ventilator 100.00 


 


1/17/23 17:34  86  135/80    


 


1/17/23 17:30  90 22 138/69 (92) 91 Mechanical Ventilator 100.00 


 


1/17/23 17:19  96 22  85   100


 


1/17/23 17:19  101      


 


1/17/23 17:15  117 22 135/79 (97) 81 Mechanical Ventilator 100.00 


 


1/17/23 17:15      Mechanical Ventilator  100


 


1/17/23 17:00  111 22 136/84 (101) 78 NIV Bilevel 100.00 


 


1/17/23 16:32 36.2 110 34 130/72 100 NIV Bilevel  


 


1/17/23 14:40 36.2 106 35 175/76 (109) 100 NIV Bilevel  














I & O 


 


 1/18/23





 07:00


 


Intake Total 5155 ml


 


Output Total 1420 ml


 


Balance 3735 ml








Height & Weight


Height: '"


Weight: lbs. oz. kg; 25.24 BMI


Method:


General Appearance:  Anxious, Chronically ill, Severe Distress (Increased work 

of breathing and difficulty answering questions due to respiratory distress and 

being on BiPAP)


HEENT:  PERRL/EOMI, Moist Mucous Membranes


Neck:  Full Range of Motion, Normal Inspection, Non Tender, Supple


Respiratory:  Chest Non Tender, Lungs Clear, No Accessory Muscle Use, No 

Respiratory Distress (Appeared more comfortable on the BiPAP and was breathing 

easier), Decreased Breath Sounds (Improved air movement from when he first 

arrived)


Cardiovascular:  Regular Rate, Rhythm, Normal Peripheral Pulses, Tachycardia 

(Sinus tachycardia with heart rate 100 bpm)


Capillary Refill:  Greater Than 3 Seconds


Peripheral Pulses:  2+ Femoral (R), 2+ Femoral (L), 2+ Dorsalis Pedis (R), 2+ 

Left Dors-Pedis (L), 2+ Radial Pulses (R), 2+ Radial Pulses (L)


Gastrointestinal:  normal bowel sounds, soft


Extremity:  No Calf Tenderness, Pedal Edema (trace edmea )


Neurologic/Psychiatric:  Alert; No Oriented x3 (Unable to assess orientation as 

he was having difficulty answering questions between his respiratory distress 

and being placed on BiPAP)


Skin:  Cool, Diaphoresis, Pallor





Results


Lab


Laboratory Tests


1/17/23 15:00








1/18/23 04:00











Assessment/Plan


Assessment/Plan


A/P


Acute resp failure , hypoxic , hypercarbic


( AECOPD, CAP , ? CHF 


- in resp distress despite NIPPV - intubated on arrival to ICU  without 

complications by ER MD ( as per discussion with ER MD in [place] - full code 

as per wife ) 


will lower vent rate to 14, lower sedation and if looks ready try SBT-tried but 

became very agitated





Shock


- was on IV levo, no off since 5 30am








PNA suspected , CAP ,  RML 


- abx will continue await sputum cultures


( NEG flu , covid) 





Met acidosis LA has dropped to 1.93, ABG has improved to 7.47/26/163, will 

decrease vent rate to 15





CAD


- s/p stenting  in past 


-EF reported 55% 2022


Critical Care:  Ventilator Management


Time spent with patient (mins):  25











HUSEYIN ROWE MD             Jan 18, 2023 07:59

## 2023-01-18 NOTE — ANESTHESIA-PROCEDURE NOTE
Procedures/Interventions


Procedure Start/Stop/Diagnosis


Date of Procedure:  Jan 18, 2023


Start Time:  07:55


Brief History


x1 attempt to right radial, unable to get patients upper extremity positioned 

appropriately possibly contractured, but would not extend fully. Sedation 

increased per nursing staff at my request, patient eyes open and coughing with 

RT suctioning of ETT. 


x1 attempt to left radial artery, ultra sound guided access and good waveform 

obtained.


Stop Time:  08:25





Arterial Line


Arterial Line Catheter:  22G


Type:  Radial


Location:  Left


Procedure:  1% lidocaine used to numb region, good wave-form was obtained, 

patient tolerated procedure well, no immediate complications, post procedure 

area cleaned, post procedure dressing applied











SYEDA KIRK CRNA            Jan 18, 2023 09:55

## 2023-01-19 VITALS — SYSTOLIC BLOOD PRESSURE: 132 MMHG | DIASTOLIC BLOOD PRESSURE: 56 MMHG

## 2023-01-19 VITALS — DIASTOLIC BLOOD PRESSURE: 50 MMHG | SYSTOLIC BLOOD PRESSURE: 125 MMHG

## 2023-01-19 VITALS — DIASTOLIC BLOOD PRESSURE: 54 MMHG | SYSTOLIC BLOOD PRESSURE: 135 MMHG

## 2023-01-19 LAB
ALBUMIN SERPL-MCNC: 3 GM/DL (ref 3.2–4.5)
ALP SERPL-CCNC: 58 U/L (ref 40–136)
ALT SERPL-CCNC: 42 U/L (ref 0–55)
ARTERIAL PATENCY WRIST A: (no result)
ARTERIAL PATENCY WRIST A: (no result)
BASE EXCESS STD BLDA CALC-SCNC: -0.9 MMOL/L (ref -2.5–2.5)
BASE EXCESS STD BLDA CALC-SCNC: -1.3 MMOL/L (ref -2.5–2.5)
BASOPHILS # BLD AUTO: 0 10^3/UL (ref 0–0.1)
BASOPHILS NFR BLD AUTO: 0 % (ref 0–10)
BDY SITE: (no result)
BDY SITE: (no result)
BILIRUB SERPL-MCNC: 0.3 MG/DL (ref 0.1–1)
BODY TEMPERATURE: 36.8
BODY TEMPERATURE: 37.9
BUN/CREAT SERPL: 10
CALCIUM SERPL-MCNC: 7.5 MG/DL (ref 8.5–10.1)
CHLORIDE SERPL-SCNC: 111 MMOL/L (ref 98–107)
CO2 BLDA CALC-SCNC: 24.4 MMOL/L (ref 21–31)
CO2 BLDA CALC-SCNC: 24.8 MMOL/L (ref 21–31)
CO2 SERPL-SCNC: 21 MMOL/L (ref 21–32)
CREAT SERPL-MCNC: 0.6 MG/DL (ref 0.6–1.3)
EOSINOPHIL # BLD AUTO: 0 10^3/UL (ref 0–0.3)
EOSINOPHIL NFR BLD AUTO: 0 % (ref 0–10)
GFR SERPLBLD BASED ON 1.73 SQ M-ARVRAT: 110 ML/MIN
GLUCOSE SERPL-MCNC: 167 MG/DL (ref 70–105)
HCT VFR BLD CALC: 33 % (ref 40–54)
HGB BLD-MCNC: 10.9 G/DL (ref 13.3–17.7)
INHALED O2 FLOW RATE: (no result) L/MIN
INHALED O2 FLOW RATE: (no result) L/MIN
LYMPHOCYTES # BLD AUTO: 0.2 10^3/UL (ref 1–4)
LYMPHOCYTES NFR BLD AUTO: 2 % (ref 12–44)
MAGNESIUM SERPL-MCNC: 2.2 MG/DL (ref 1.6–2.4)
MANUAL DIFFERENTIAL PERFORMED BLD QL: NO
MCH RBC QN AUTO: 33 PG (ref 25–34)
MCHC RBC AUTO-ENTMCNC: 33 G/DL (ref 32–36)
MCV RBC AUTO: 98 FL (ref 80–99)
MONOCYTES # BLD AUTO: 0.5 10^3/UL (ref 0–1)
MONOCYTES NFR BLD AUTO: 5 % (ref 0–12)
NEUTROPHILS # BLD AUTO: 9.1 10^3/UL (ref 1.8–7.8)
NEUTROPHILS NFR BLD AUTO: 92 % (ref 42–75)
PCO2 BLDA: 41 MMHG (ref 35–45)
PCO2 BLDA: 43 MMHG (ref 35–45)
PH BLDA: 7.36 [PH] (ref 7.37–7.43)
PH BLDA: 7.38 [PH] (ref 7.37–7.43)
PHOSPHATE SERPL-MCNC: 2.4 MG/DL (ref 2.3–4.7)
PLATELET # BLD: 103 10^3/UL (ref 130–400)
PMV BLD AUTO: 10.1 FL (ref 9–12.2)
PO2 BLDA: 78 MMHG (ref 79–93)
PO2 BLDA: 86 MMHG (ref 79–93)
POTASSIUM SERPL-SCNC: 3.6 MMOL/L (ref 3.6–5)
PROT SERPL-MCNC: 5.3 GM/DL (ref 6.4–8.2)
SAO2 % BLDA FROM PO2: 97 % (ref 94–100)
SAO2 % BLDA FROM PO2: 97 % (ref 94–100)
SODIUM SERPL-SCNC: 141 MMOL/L (ref 135–145)
VENTILATION MODE VENT: YES
VENTILATION MODE VENT: YES
WBC # BLD AUTO: 9.8 10^3/UL (ref 4.3–11)

## 2023-01-19 PROCEDURE — 5A0945A ASSISTANCE WITH RESPIRATORY VENTILATION, 24-96 CONSECUTIVE HOURS, HIGH NASAL FLOW/VELOCITY: ICD-10-PCS

## 2023-01-19 RX ADMIN — SODIUM CHLORIDE SCH MLS/HR: 900 INJECTION, SOLUTION INTRAVENOUS at 15:01

## 2023-01-19 RX ADMIN — VANCOMYCIN HYDROCHLORIDE SCH MLS/HR: 500 INJECTION, POWDER, LYOPHILIZED, FOR SOLUTION INTRAVENOUS at 04:52

## 2023-01-19 RX ADMIN — IPRATROPIUM BROMIDE AND ALBUTEROL SULFATE SCH ML: .5; 3 SOLUTION RESPIRATORY (INHALATION) at 07:06

## 2023-01-19 RX ADMIN — POTASSIUM CHLORIDE SCH MLS/HR: 200 INJECTION, SOLUTION INTRAVENOUS at 05:33

## 2023-01-19 RX ADMIN — IPRATROPIUM BROMIDE AND ALBUTEROL SULFATE SCH ML: .5; 3 SOLUTION RESPIRATORY (INHALATION) at 10:42

## 2023-01-19 RX ADMIN — METHYLPREDNISOLONE SODIUM SUCCINATE SCH MG: 40 INJECTION, POWDER, FOR SOLUTION INTRAMUSCULAR; INTRAVENOUS at 04:52

## 2023-01-19 RX ADMIN — PANTOPRAZOLE SODIUM SCH MG: 40 INJECTION, POWDER, FOR SOLUTION INTRAVENOUS at 08:04

## 2023-01-19 RX ADMIN — Medication SCH MLS/HR: at 04:52

## 2023-01-19 RX ADMIN — VASOPRESSIN SCH MLS/HR: 20 INJECTION INTRAVENOUS at 02:12

## 2023-01-19 RX ADMIN — IPRATROPIUM BROMIDE AND ALBUTEROL SULFATE SCH ML: .5; 3 SOLUTION RESPIRATORY (INHALATION) at 02:30

## 2023-01-19 RX ADMIN — LABETALOL HYDROCHLORIDE PRN MG: 5 INJECTION, SOLUTION INTRAVENOUS at 23:55

## 2023-01-19 RX ADMIN — LABETALOL HYDROCHLORIDE PRN MG: 5 INJECTION, SOLUTION INTRAVENOUS at 22:14

## 2023-01-19 RX ADMIN — POTASSIUM CHLORIDE SCH MEQ: 1500 TABLET, EXTENDED RELEASE ORAL at 05:05

## 2023-01-19 RX ADMIN — VASOPRESSIN SCH MLS/HR: 20 INJECTION INTRAVENOUS at 14:00

## 2023-01-19 RX ADMIN — Medication SCH MLS/HR: at 02:12

## 2023-01-19 RX ADMIN — METOPROLOL TARTRATE SCH MG: 1 INJECTION, SOLUTION INTRAVENOUS at 12:46

## 2023-01-19 RX ADMIN — PANTOPRAZOLE SODIUM SCH MG: 40 INJECTION, POWDER, FOR SOLUTION INTRAVENOUS at 21:40

## 2023-01-19 RX ADMIN — METOPROLOL TARTRATE SCH MG: 1 INJECTION, SOLUTION INTRAVENOUS at 23:15

## 2023-01-19 RX ADMIN — PROPOFOL SCH MLS/HR: 10 INJECTION, EMULSION INTRAVENOUS at 09:51

## 2023-01-19 RX ADMIN — PROPOFOL SCH MLS/HR: 10 INJECTION, EMULSION INTRAVENOUS at 04:52

## 2023-01-19 RX ADMIN — MAGNESIUM SULFATE IN DEXTROSE SCH MLS/HR: 10 INJECTION, SOLUTION INTRAVENOUS at 05:05

## 2023-01-19 RX ADMIN — METHYLPREDNISOLONE SODIUM SUCCINATE SCH MG: 40 INJECTION, POWDER, FOR SOLUTION INTRAMUSCULAR; INTRAVENOUS at 12:47

## 2023-01-19 RX ADMIN — IPRATROPIUM BROMIDE AND ALBUTEROL SULFATE SCH ML: .5; 3 SOLUTION RESPIRATORY (INHALATION) at 15:15

## 2023-01-19 RX ADMIN — Medication SCH MLS/HR: at 16:30

## 2023-01-19 RX ADMIN — IPRATROPIUM BROMIDE AND ALBUTEROL SULFATE SCH ML: .5; 3 SOLUTION RESPIRATORY (INHALATION) at 21:52

## 2023-01-19 RX ADMIN — METHYLPREDNISOLONE SODIUM SUCCINATE SCH MG: 40 INJECTION, POWDER, FOR SOLUTION INTRAMUSCULAR; INTRAVENOUS at 21:40

## 2023-01-19 RX ADMIN — METOPROLOL TARTRATE SCH MG: 1 INJECTION, SOLUTION INTRAVENOUS at 17:47

## 2023-01-19 RX ADMIN — POTASSIUM CHLORIDE SCH MLS/HR: 200 INJECTION, SOLUTION INTRAVENOUS at 05:06

## 2023-01-19 RX ADMIN — LORAZEPAM PRN MG: 1 TABLET ORAL at 11:34

## 2023-01-19 RX ADMIN — LORAZEPAM PRN MG: 1 TABLET ORAL at 23:45

## 2023-01-19 RX ADMIN — CEFTRIAXONE SCH MLS/HR: 1 INJECTION, SOLUTION INTRAVENOUS at 15:01

## 2023-01-19 NOTE — PROGRESS NOTE
ROJAS HICKS 23 1344:


Subjective


Date Seen by a Provider:  2023


Time Seen by a Provider:  09:50


Subjective/Events-last exam


Mr. Gamez is a 63 y/o male with a PMHx of CAD with stents, PVD with stents, 

HTN, COPD, CHF, stroke with R-sided hemiparesis, and HLD who presented to the 

[facility] ED with SOA on , onset earlier that day. Patient was exhibiting 

signs of acute respiratory failure with hypoxia. Patient was transported via EMS

to [facility]. Due to continued decline in respiratory status despite CPAP 

therapy, the patient was admitted to the ICU and intubated shortly after 

arrival. 





: The patient is intubated and sedated. 


Vent settings: 500/26/6 with FiO2 of 30%


AB.47/25/163, HCO3 18


NG tube in place in left nare, confirmed in stomach with XRAY


ET in place, confirmed with CXR


Urinary catheter in place, urine output closely monitored


SCDs and boots in place for LE off-loading





: The patient is intubated and sedated. The patient is agitated during 

physical exam. Left-sided UE tremor is noted. The patient's family reports that 

this tremor has been present for 2 years. The patient was too agitated to 

attempt SBT yesterday. Continue to appreciate plan per Critical Care. 


Vent settings: 550/14/6.0, 30%


AB.38/41/78, HCO3 24


SBT today if patient tolerates


Review of Systems


Unable to obtain





Focused Exam


Lactate Level


23 17:52: Lactic Acid Level 4.37*H


23 19:40: Lactic Acid Level 3.19*H


23 22:10: Lactic Acid Level 1.93


Time of Focused Exam:  16:06





Objective


Exam


Last Set of Vital Signs





Vital Signs








  Date Time  Temp Pulse Resp B/P (MAP) Pulse Ox O2 Delivery O2 Flow Rate FiO2


 


23 12:43  122      


 


23 12:00   21  93 Nasal Cannula 5.00 


 


23 10:42        30


 


23 08:00 36.7       





Capillary Refill : Greater Than 3 Seconds


I&O











Intake and Output 


 


 23





 00:00


 


Intake Total 4165.0 ml


 


Output Total 3525 ml


 


Balance 640.0 ml


 


 


 


Intake Oral 0 ml


 


IV Total 4075.0 ml


 


Other 90 ml


 


Output Urine Total 3175 ml


 


Gastric Drainage Total 350 ml








General:  Other (intubated and sedated. Easily agitated during physical exam)


Lungs:  Clear to Auscultation, Normal Air Movement


Heart:  Regular Rate


Abdomen:  Normal Bowel Sounds, Soft


Extremities:  Other (trace edema of bilateral LE, redness of bilateral LE)





Results


Lab


Laboratory Tests


23 16:30: 


23 17:43: Glucometer 184H


23 23:53: Glucometer 162H


23 04:15: 


White Blood Count 9.8, Red Blood Count 3.32L, Hemoglobin 10.9L, Hematocrit 33L, 

Mean Corpuscular Volume 98, Mean Corpuscular Hemoglobin 33, Mean Corpuscular 

Hemoglobin Concent 33, Red Cell Distribution Width 13.1, Platelet Count 103L, 

Mean Platelet Volume 10.1, Immature Granulocyte % (Auto) 1, Neutrophils (%) 

(Auto) 92H, Lymphocytes (%) (Auto) 2L, Monocytes (%) (Auto) 5, Eosinophils (%) 

(Auto) 0, Basophils (%) (Auto) 0, Neutrophils # (Auto) 9.1H, Lymphocytes # 

(Auto) 0.2L, Monocytes # (Auto) 0.5, Eosinophils # (Auto) 0.0, Basophils # 

(Auto) 0.0, Immature Granulocyte # (Auto) 0.1, Blood Gas Puncture Site JULIA, 

Blood Gas Patient Temperature 36.8, Arterial Blood pH 7.38, Arterial Blood Parti

al Pressure CO2 41, Arterial Blood Partial Pressure O2 78L, Arterial Blood HCO3 

24, Arterial Blood Total CO2 24.8, Arterial Blood Oxygen Saturation 97, Arterial

Blood Base Excess -0.9, Hansel Test ART LINE, Blood Gas Ventilator Setting YES, 

Blood Gas Inspired Oxygen 30%, Sodium Level 141, Potassium Level 3.6, Chloride 

Level 111H, Carbon Dioxide Level 21, Anion Gap 9, Blood Urea Nitrogen 6L, 

Creatinine 0.60, Estimat Glomerular Filtration Rate 110, BUN/Creatinine Ratio 

10, Glucose Level 167H, Calcium Level 7.5L, Corrected Calcium 8.3L, Phosphorus 

Level 2.4, Magnesium Level 2.2, Total Bilirubin 0.3, Aspartate Amino Transf 

(AST/SGOT) 38H, Alanine Aminotransferase (ALT/SGPT) 42, Alkaline Phosphatase 58,

Total Protein 5.3L, Albumin 3.0L, Triglycerides Level 49


23 11:25: 


Blood Gas Puncture Site ART, Blood Gas Patient Temperature 37.9, Arterial Blood 

pH 7.36L, Arterial Blood Partial Pressure CO2 43, Arterial Blood Partial 

Pressure O2 86, Arterial Blood HCO3 23, Arterial Blood Total CO2 24.4, Arterial 

Blood Oxygen Saturation 97, Arterial Blood Base Excess -1.3, Hansel Test ART 

LINE, Blood Gas Ventilator Setting YES, Blood Gas Inspired Oxygen 30%


23 11:49: Glucometer 121H





Microbiology


23 MRSA Screen - Final, Complete


          MRSA not isolated


23 Blood Culture - Preliminary, Resulted


          No growth





Assessment/Plan


Assessment/Plan


Assess & Plan/Chief Complaint


Acute respiratory failure with hypoxia likely secondary to AECOPD





Diagnosis/Problems


Diagnosis/Problems





(1) Stroke with right hemiparesis


Status:  Chronic


Assessment & Plan:  -Resume home medications: ASA 81mg qd, atorvastatin 20mg qd


-PT/OT services for rehabilitation 





(2) Hyperlipidemia


Status:  Chronic


Assessment & Plan:  -Resume home medication atorvastatin 20mg qd





(3) Hypertension


Status:  Chronic


Assessment & Plan:  Monitor BP


Consider resuming home medications amlodipine 5mg qd, lisinopril 20mg qd, 

metoprolol 50mg qd if BP >140/90





(4) Coronary artery disease


Status:  Chronic


Assessment & Plan:  Resume home medications: ASA 81mg qd, atorvastatin 20mg qd, 

and metoprolol 50mg if BP and HR will tolerate





(5) Peripheral vascular disease


Status:  Chronic


Assessment & Plan:  -Stent place in R LE in the past month at [place] per 

patient report. Continue home medications: Plavix 75mg qd





(6) Acute respiratory failure with hypoxia


Status:  Acute


Assessment & Plan:  : 


Acute respiratory failure with hypoxia likely secondary to AECOPD. Patient is 

intubated and sedated. Appreciate plan per Critical Care. Spontaneous breathing 

trial this evening. Potential extubation on .


-Antibiotic therapy: vancomycin 1250mg x3days with trough, Rocephin 1gm x5days, 

azithromycin 250mg x5days 


-Systemic steroids: solumedrol 40mg IV qd


-Duoneb 3ml RTQ4H INH


-PT/OT


-Protonix 40mg IV qd for stress ulcer prophylaxis





1/19: 


-Continue medication management and appreciate plan per Critical Care


-Ativan 1-2mg PO Q3H PRN for anxiety management to help decrease agitation


-MRSA not isolated, consider discontinuing vancomycin





(7) Acute exacerbation of chronic obstructive pulmonary disease (COPD)


Status:  Acute


Assessment & Plan:  : 


Acute respiratory failure with hypoxia likely secondary to AECOPD. Patient is 

intubated and sedated. Appreciate plan per Critical Care. Spontaneous breathing 

trial this evening. Potential extubation on .


-Antibiotic therapy: vancomycin 1250mg x3days with trough, Rocephin 1gm x5days, 

azithromycin 250mg x5days 


-Systemic steroids: solumedrol 40mg IV qd


-Duoneb 3ml RTQ4H INH


-PT/OT





(8) Hypomagnesemia


Status:  Acute


Assessment & Plan:  : Mg 1.3. Replace with Magnesium sulfate 2gm. Monitor 

with AM Mg. Continue to replace if Mg <1.7








MELL MOCTEZUMA MD 23 1520:


Objective


Exam


General:  Other (Intubated, opens eyes and follows commands)


Lungs:  Clear to Auscultation, Normal Air Movement


Heart:  Regular Rate, No Murmurs


Abdomen:  Normal Bowel Sounds, Soft


Extremities:  Other (trace edema of bilateral LE, redness of bilateral LE)





Supervisory-Addendum Brief


Verification & Attestation


Participated in pt care:  history, physical


Personally performed:  exam, history


Care discussed with:  Medical Student


Procedures:  n/a


Verification and Attestation of Medical Student E/M Service





A medical student performed and documented this service in my presence. I 

reviewed and verified all information documented by the medical student and made

modifications to such information, when appropriate. I personally performed the 

physical exam and medical decision making. 





 Mell Moctezuma, 2023,15:19


  


Acute on Chronic Respiratory failure with hypoxia


Septic Shock


AECOPD


PNA


Metabolic Acidosis


Lactic Acidosis


CAD





- Patient intubated and sedated, eICU managing, SBT today


- IV antibiotics and steroids


- IVFs with good UOP


- Off pressers this AM, will continue to monitor


- Labs improving


- Continue ICU care





:


- SBT today, possible extubation


- Continue IV antibiotics and steroids


- Acidosis improving











ROJAS HICKS               2023 13:44


MELL MOCTEZUMA MD               2023 15:20

## 2023-01-19 NOTE — TELE-ICU PROGRESS NOTE
Subjective


Date Seen by a Provider:  Jan 19, 2023


Time Seen by a Provider:  10:11


Subjective/Events-last exam











(Tele-ICU Physician , Progress Note )


Service provided via interactive audio and video telecommunications  E-CARE 

system to a patient admitted to ICU bed in [facility].


Patient is seen today due to persistent need of  ICU care





Available chart/ vitals / labs / Images reviewed


Video assessment done using   teleICU camera, rest of exam as per RN


Discussed with RN


Events overnight : 





Afebrile


hemodynamically stable


Respiratory - 


I/O = even 





Drips:  


Pressors- no








VENT SETTINGS and ABG   reviewed


NOT CANDIDATE   for SBTreviewed  possible contraindications including Cardiova

scular Stability /Sedation Score / FI02/PEEP / ABG / CXR/ secretions 





Sedation, discussed with RN,   RASS  on  fent  propof








Consultants:


Hospital course:


(1/17) 61y/o M admitted with sepsis pneumonia vs COPD. Respiratory failure. 

Intubated on arrival to ICU. 


1/18 - very agitated off sedation 


1/19-  OFF pressors ,AC 14, Vt 550, FiO2 30% +6








A/P


Acute resp failure , hypoxic , hypercarbic


( AECOPD, CAP , ? CHF 


- in resp distress despite NIPPV - intubated on arrival to ICU 


-intubated 1/17 - AC 14, Vt 550, FiO2 30% +6 


-SAT 1/18 - very agitated , not attempted SBT 


WILL TRY TODAY SAT AND POOSIBLY SBT 





Shock


- off all pressors today 








PNA suspected , CAP ,  RML 


- abx initiated , to cont 


( NEG flu , covid) 





CAD


- s/p stenting  in past 


-EF reported 55% 2022





thrombocytopenia 


 - asummed  delutional - monitor for now , was not on any heparins sine 

admisssion 





Anemia


- mimimal  blood on NG - will cont PPI bid 





Nutrition 


- start TF if not extubated 





H/o ETOHuse / ? abuse - as per family report 


 - starting on thiamine , folate 





S/p CVA 











Lines :   R IJ 1/17    , (Central Line Necessity Reviewed)


P[place]: +


OG:


Nutrition: 


Analgesia:


Anxiety/ delirium








VTE Prophylaxis:  SCD 


Stress Ulcer Prophylaxis: PPI bid 





Plans in collaboration with   bedside consultants and IM MDs.


Discussed with RN to reach out if any questions or concerns


A total of33 minutes of critical care time was devoted to this patient today, 

required to treat and/or prevent further deterioration of  critical care 

condition ( as above ) .





I am remotely monitoring this patient from another state.  I am unable to do the

bedside exam, and history/physical and pertinent information is taken from other

notes in the computer and bedside staff. .





Sepsis Event


Evaluation


Height, Weight, BMI


Height: '"


Weight: lbs. oz. kg; 25.68 BMI


Method:





Focused Exam


Lactate Level


1/17/23 17:52: Lactic Acid Level 4.37*H


1/17/23 19:40: Lactic Acid Level 3.19*H


1/17/23 22:10: Lactic Acid Level 1.93


Time of Focused Exam:  16:06





Exam


Exam


Patient acknowledged, consented, and participated in this virtual visit which 

was conducted using real time audio/video


Vital Signs








  Date Time  Temp Pulse Resp B/P (MAP) Pulse Ox O2 Delivery O2 Flow Rate FiO2


 


1/19/23 09:51  79  133/53    


 


1/19/23 08:00  81 14  100 Mechanical Ventilator 30.00 


 


1/19/23 08:00 36.7       


 


1/19/23 07:06  77 14  100   30


 


1/19/23 07:00  80 13  100 Mechanical Ventilator 30.00 


 


1/19/23 07:00  75      


 


1/19/23 06:00  81 13  99 Mechanical Ventilator 30.00 


 


1/19/23 05:00  90 13  98 Mechanical Ventilator 30.00 


 


1/19/23 04:52  81  132/56    


 


1/19/23 04:52  81  132/56    


 


1/19/23 04:09     99 Mechanical Ventilator  30


 


1/19/23 04:08 36.8     Mechanical Ventilator 30.00 


 


1/19/23 04:00  81 14  98 Mechanical Ventilator 30.00 


 


1/19/23 04:00        30


 


1/19/23 03:58  81  132/56    


 


1/19/23 03:00  81 16  99 Mechanical Ventilator 30.00 


 


1/19/23 02:30  87 14  99   30


 


1/19/23 02:00  82 14  99 Mechanical Ventilator 30.00 


 


1/19/23 01:00  86 14  98 Mechanical Ventilator 30.00 


 


1/19/23 01:00  81      


 


1/19/23 00:00        30


 


1/19/23 00:00  87 13  99 Mechanical Ventilator 30.00 


 


1/19/23 00:00     99 Mechanical Ventilator  30


 


1/18/23 23:58 37.0     Mechanical Ventilator 30.00 


 


1/18/23 23:54  89  122/50    


 


1/18/23 23:00  90 14  98 Mechanical Ventilator 30.00 


 


1/18/23 23:00  89  122/50    


 


1/18/23 22:17  89 14  98   30


 


1/18/23 22:00  90 14  97 Mechanical Ventilator 30.00 


 


1/18/23 21:00  96 29  94 Mechanical Ventilator 30.00 


 


1/18/23 20:00  85 14  97 Mechanical Ventilator 30.00 


 


1/18/23 19:56     97 Mechanical Ventilator  30


 


1/18/23 19:53   14   Mechanical Ventilator 30.00 


 


1/18/23 19:50        30


 


1/18/23 19:03 36.9       


 


1/18/23 19:00  85      


 


1/18/23 19:00  84 13  97 Mechanical Ventilator 30.00 


 


1/18/23 19:00  82  118/52    


 


1/18/23 18:36  84 14  97   30


 


1/18/23 18:00  88 20  97 Mechanical Ventilator 30.00 


 


1/18/23 17:30  85  109/50    


 


1/18/23 17:30  85  109/50    


 


1/18/23 17:20      Mechanical Ventilator 30.00 


 


1/18/23 17:00  87 18  98 Mechanical Ventilator 30.00 


 


1/18/23 16:00     97 Mechanical Ventilator  30


 


1/18/23 16:00  94 42  96 Mechanical Ventilator 30.00 


 


1/18/23 16:00        30


 


1/18/23 15:42 36.9       


 


1/18/23 15:00  94 14  95 Mechanical Ventilator 30.00 


 


1/18/23 14:54  93 15  95   30


 


1/18/23 14:00  78 13  98 Mechanical Ventilator 30.00 


 


1/18/23 13:28  81  117/66    


 


1/18/23 13:28  81  117/66    


 


1/18/23 13:14  79      


 


1/18/23 13:05  80  118/66    


 


1/18/23 13:00  79 13  96 Mechanical Ventilator 30.00 


 


1/18/23 12:14  83  89/46    


 


1/18/23 12:00  85 13 80/47 (58) 96 Mechanical Ventilator 30.00 





        


 


1/18/23 12:00     98 Mechanical Ventilator  30


 


1/18/23 12:00        30


 


1/18/23 11:56 36.8       


 


1/18/23 11:00  93 12 111/64 (80) 99 Mechanical Ventilator 30.00 





        


 


1/18/23 10:12  75 14  99   30














I & O 


 


 1/19/23





 07:00


 


Intake Total 3115.0 ml


 


Output Total 3450 ml


 


Balance -335.0 ml








Height & Weight


Height: '"


Weight: lbs. oz. kg; 25.68 BMI


Method:


General Appearance:  Anxious, Chronically ill, Severe Distress (Increased work 

of breathing and difficulty answering questions due to respiratory distress and 

being on BiPAP)


HEENT:  PERRL/EOMI, Moist Mucous Membranes


Neck:  Full Range of Motion, Normal Inspection, Non Tender, Supple


Respiratory:  Chest Non Tender, Lungs Clear, No Accessory Muscle Use, No 

Respiratory Distress (Appeared more comfortable on the BiPAP and was breathing 

easier), Decreased Breath Sounds (Improved air movement from when he first 

arrived)


Cardiovascular:  Regular Rate, Rhythm, Normal Peripheral Pulses, Tachycardia 

(Sinus tachycardia with heart rate 100 bpm)


Capillary Refill:  Greater Than 3 Seconds


Peripheral Pulses:  2+ Femoral (R), 2+ Femoral (L), 2+ Dorsalis Pedis (R), 2+ Le

ft Dors-Pedis (L), 2+ Radial Pulses (R), 2+ Radial Pulses (L)


Gastrointestinal:  normal bowel sounds, soft


Extremity:  No Calf Tenderness, Pedal Edema (trace edmea )


Neurologic/Psychiatric:  Alert; No Oriented x3 (Unable to assess orientation as 

he was having difficulty answering questions between his respiratory distress 

and being placed on BiPAP)


Skin:  Cool, Diaphoresis, Pallor





Results


Lab


Laboratory Tests


1/17/23 15:00








1/18/23 04:00








1/19/23 04:15











Assessment/Plan


Assessment/Plan


1











TON MOHAMUD MD         Jan 19, 2023 10:12

## 2023-01-20 LAB
ALBUMIN SERPL-MCNC: 3 GM/DL (ref 3.2–4.5)
ALP SERPL-CCNC: 58 U/L (ref 40–136)
ALT SERPL-CCNC: 47 U/L (ref 0–55)
ARTERIAL PATENCY WRIST A: YES
BASE EXCESS STD BLDA CALC-SCNC: 2.3 MMOL/L (ref -2.5–2.5)
BASOPHILS # BLD AUTO: 0 10^3/UL (ref 0–0.1)
BASOPHILS NFR BLD AUTO: 0 % (ref 0–10)
BDY SITE: (no result)
BILIRUB SERPL-MCNC: 0.6 MG/DL (ref 0.1–1)
BODY TEMPERATURE: 36.8
BUN/CREAT SERPL: 17
CALCIUM SERPL-MCNC: 7.7 MG/DL (ref 8.5–10.1)
CHLORIDE SERPL-SCNC: 110 MMOL/L (ref 98–107)
CO2 BLDA CALC-SCNC: 27.9 MMOL/L (ref 21–31)
CO2 SERPL-SCNC: 23 MMOL/L (ref 21–32)
CREAT SERPL-MCNC: 0.66 MG/DL (ref 0.6–1.3)
EOSINOPHIL # BLD AUTO: 0 10^3/UL (ref 0–0.3)
EOSINOPHIL NFR BLD AUTO: 0 % (ref 0–10)
GFR SERPLBLD BASED ON 1.73 SQ M-ARVRAT: 107 ML/MIN
GLUCOSE SERPL-MCNC: 118 MG/DL (ref 70–105)
HCT VFR BLD CALC: 36 % (ref 40–54)
HGB BLD-MCNC: 12 G/DL (ref 13.3–17.7)
INHALED O2 FLOW RATE: (no result) L/MIN
LYMPHOCYTES # BLD AUTO: 0.5 10^3/UL (ref 1–4)
LYMPHOCYTES NFR BLD AUTO: 4 % (ref 12–44)
MAGNESIUM SERPL-MCNC: 2.1 MG/DL (ref 1.6–2.4)
MANUAL DIFFERENTIAL PERFORMED BLD QL: NO
MCH RBC QN AUTO: 33 PG (ref 25–34)
MCHC RBC AUTO-ENTMCNC: 33 G/DL (ref 32–36)
MCV RBC AUTO: 99 FL (ref 80–99)
MONOCYTES # BLD AUTO: 0.6 10^3/UL (ref 0–1)
MONOCYTES NFR BLD AUTO: 5 % (ref 0–12)
NEUTROPHILS # BLD AUTO: 10.8 10^3/UL (ref 1.8–7.8)
NEUTROPHILS NFR BLD AUTO: 90 % (ref 42–75)
PCO2 BLDA: 42 MMHG (ref 35–45)
PH BLDA: 7.41 [PH] (ref 7.37–7.43)
PHOSPHATE SERPL-MCNC: 3 MG/DL (ref 2.3–4.7)
PLATELET # BLD: 102 10^3/UL (ref 130–400)
PMV BLD AUTO: 10.2 FL (ref 9–12.2)
PO2 BLDA: 68 MMHG (ref 79–93)
POTASSIUM SERPL-SCNC: 3.3 MMOL/L (ref 3.6–5)
PROT SERPL-MCNC: 5.5 GM/DL (ref 6.4–8.2)
SAO2 % BLDA FROM PO2: 96 % (ref 94–100)
SODIUM SERPL-SCNC: 142 MMOL/L (ref 135–145)
VENTILATION MODE VENT: NO
WBC # BLD AUTO: 12 10^3/UL (ref 4.3–11)

## 2023-01-20 RX ADMIN — Medication SCH MLS/HR: at 19:44

## 2023-01-20 RX ADMIN — SODIUM CHLORIDE SCH MLS/HR: 900 INJECTION INTRAVENOUS at 08:39

## 2023-01-20 RX ADMIN — LABETALOL HYDROCHLORIDE PRN MG: 5 INJECTION, SOLUTION INTRAVENOUS at 02:54

## 2023-01-20 RX ADMIN — ENOXAPARIN SODIUM SCH MG: 100 INJECTION SUBCUTANEOUS at 11:17

## 2023-01-20 RX ADMIN — METOPROLOL TARTRATE SCH MG: 1 INJECTION, SOLUTION INTRAVENOUS at 11:17

## 2023-01-20 RX ADMIN — POTASSIUM CHLORIDE SCH MLS/HR: 200 INJECTION, SOLUTION INTRAVENOUS at 05:04

## 2023-01-20 RX ADMIN — METOPROLOL TARTRATE SCH MG: 1 INJECTION, SOLUTION INTRAVENOUS at 05:12

## 2023-01-20 RX ADMIN — Medication SCH MG: at 05:13

## 2023-01-20 RX ADMIN — METOPROLOL TARTRATE SCH MG: 1 INJECTION, SOLUTION INTRAVENOUS at 17:57

## 2023-01-20 RX ADMIN — SODIUM CHLORIDE SCH MLS/HR: 900 INJECTION INTRAVENOUS at 17:13

## 2023-01-20 RX ADMIN — IPRATROPIUM BROMIDE AND ALBUTEROL SULFATE SCH ML: .5; 3 SOLUTION RESPIRATORY (INHALATION) at 02:21

## 2023-01-20 RX ADMIN — SODIUM CHLORIDE SCH MLS/HR: 900 INJECTION INTRAVENOUS at 01:08

## 2023-01-20 RX ADMIN — IPRATROPIUM BROMIDE AND ALBUTEROL SULFATE SCH ML: .5; 3 SOLUTION RESPIRATORY (INHALATION) at 07:15

## 2023-01-20 RX ADMIN — IPRATROPIUM BROMIDE AND ALBUTEROL SULFATE SCH ML: .5; 3 SOLUTION RESPIRATORY (INHALATION) at 14:38

## 2023-01-20 RX ADMIN — IPRATROPIUM BROMIDE AND ALBUTEROL SULFATE SCH ML: .5; 3 SOLUTION RESPIRATORY (INHALATION) at 21:44

## 2023-01-20 RX ADMIN — Medication SCH MLS/HR: at 08:31

## 2023-01-20 RX ADMIN — POTASSIUM CHLORIDE SCH MEQ: 1500 TABLET, EXTENDED RELEASE ORAL at 05:05

## 2023-01-20 RX ADMIN — METHYLPREDNISOLONE SODIUM SUCCINATE SCH MG: 40 INJECTION, POWDER, FOR SOLUTION INTRAMUSCULAR; INTRAVENOUS at 05:12

## 2023-01-20 RX ADMIN — VASOPRESSIN SCH MLS/HR: 20 INJECTION INTRAVENOUS at 19:44

## 2023-01-20 RX ADMIN — FOLIC ACID SCH MG: 1 TABLET ORAL at 08:39

## 2023-01-20 RX ADMIN — SODIUM CHLORIDE SCH MLS/HR: 900 INJECTION, SOLUTION INTRAVENOUS at 17:57

## 2023-01-20 RX ADMIN — POTASSIUM CHLORIDE SCH MLS/HR: 200 INJECTION, SOLUTION INTRAVENOUS at 06:54

## 2023-01-20 RX ADMIN — LORAZEPAM PRN MG: 1 TABLET ORAL at 11:23

## 2023-01-20 RX ADMIN — POTASSIUM CHLORIDE SCH MLS/HR: 200 INJECTION, SOLUTION INTRAVENOUS at 05:13

## 2023-01-20 RX ADMIN — METHYLPREDNISOLONE SODIUM SUCCINATE SCH MG: 40 INJECTION, POWDER, FOR SOLUTION INTRAMUSCULAR; INTRAVENOUS at 15:06

## 2023-01-20 RX ADMIN — METHYLPREDNISOLONE SODIUM SUCCINATE SCH MG: 40 INJECTION, POWDER, FOR SOLUTION INTRAMUSCULAR; INTRAVENOUS at 21:06

## 2023-01-20 RX ADMIN — VASOPRESSIN SCH MLS/HR: 20 INJECTION INTRAVENOUS at 01:08

## 2023-01-20 RX ADMIN — VASOPRESSIN SCH MLS/HR: 20 INJECTION INTRAVENOUS at 11:50

## 2023-01-20 RX ADMIN — POTASSIUM CHLORIDE SCH MLS/HR: 200 INJECTION, SOLUTION INTRAVENOUS at 06:55

## 2023-01-20 RX ADMIN — MAGNESIUM SULFATE IN DEXTROSE SCH MLS/HR: 10 INJECTION, SOLUTION INTRAVENOUS at 05:05

## 2023-01-20 RX ADMIN — PANTOPRAZOLE SODIUM SCH MG: 40 INJECTION, POWDER, FOR SOLUTION INTRAVENOUS at 08:39

## 2023-01-20 RX ADMIN — PANTOPRAZOLE SODIUM SCH MG: 40 INJECTION, POWDER, FOR SOLUTION INTRAVENOUS at 21:06

## 2023-01-20 NOTE — TELE-ICU PROGRESS NOTE
Subjective


Date Seen by a Provider:  Jan 20, 2023


Subjective/Events-last exam


Called by RN re worsening B crackles, severe hypoxemia. BNP 8934. Normal BUN/CR.

PCXR, lasix 40 mg IV ordered. CXR reviewed and pt has new very extensive R 

infiltrate c/t 1/17/2023. Being treated for CAP. Will add Zosyn to cover HCAP. 

Results shared w bedside and e ICU RNs. May need reintubation.





Sepsis Event


Evaluation


Height, Weight, BMI


Height: '"


Weight: lbs. oz. kg; 25.68 BMI


Method:





Focused Exam


Lactate Level


1/17/23 17:52: Lactic Acid Level 4.37*H


1/17/23 19:40: Lactic Acid Level 3.19*H


1/17/23 22:10: Lactic Acid Level 1.93


Time of Focused Exam:  16:06





Exam


Exam


Patient acknowledged, consented, and participated in this virtual visit which 

was conducted using real time audio/video


Vital Signs








  Date Time  Temp Pulse Resp B/P (MAP) Pulse Ox O2 Delivery O2 Flow Rate FiO2


 


1/20/23 00:10     95 Vapotherm 40.00 100


 


1/20/23 00:01     92 Vapotherm 40.00 





       100.00 


 


1/19/23 23:39     90 Vapotherm 30.00 





       100.00 


 


1/19/23 23:19 36.8     Vapotherm 30.00 





       80.00 


 


1/19/23 23:00  101 21 139/79 (99) 99 Vapotherm 30.00 





    181/75 (110)   100.00 


 


1/19/23 22:40     93 Vapotherm 30.00 100


 


1/19/23 22:31     93 Vapotherm 30.00 





       100.00 


 


1/19/23 22:15     88 High Flow N/C 10.00 


 


1/19/23 22:00     90 High Flow N/C 8.00 


 


1/19/23 22:00  112 19 151/74 (99) 97 High Flow N/C 8.00 





    168/76 (106)    


 


1/19/23 21:52     93 Nasal Cannula 5.00 


 


1/19/23 21:00  105 16 161/106 (124) 95 High Flow N/C 5.00 





    146/64 (91)    


 


1/19/23 20:05     94 High Flow N/C 5.00 


 


1/19/23 20:00 36.6 109 17 122/70 (87) 95   


 


1/19/23 19:48      High Flow N/C 5.00 


 


1/19/23 19:00  112 29 131/69 (89) 93 Nasal Cannula 5.00 


 


1/19/23 19:00 36.6       


 


1/19/23 19:00  114      


 


1/19/23 19:00  112 29 131/69 (89) 93 Nasal Cannula 5.00 


 


1/19/23 18:06 36.9 114   96   


 


1/19/23 18:00  104 20 132/113 (119) 93 Nasal Cannula 5.00 


 


1/19/23 17:20      Nasal Cannula 5.00 


 


1/19/23 17:00  108 14 151/81 (104) 97 Nasal Cannula 5.00 


 


1/19/23 16:30  112  138/82    


 


1/19/23 16:00     96 Nasal Cannula 5.00 


 


1/19/23 16:00 36.9 115 20 138/82 (100) 97 Nasal Cannula 5.00 


 


1/19/23 16:00 36.9 114 18 133/73 (93) 96   


 


1/19/23 15:00  113 29 129/77 (94) 96 Nasal Cannula 5.00 


 


1/19/23 14:00  114 16 120/70 (87) 99 Nasal Cannula 5.00 


 


1/19/23 13:55  105  139/63    


 


1/19/23 13:00  115 15  97 Nasal Cannula 5.00 


 


1/19/23 12:43  122      


 


1/19/23 12:00  75 21  93 Nasal Cannula 5.00 


 


1/19/23 12:00     94 Nasal Cannula 5.00 


 


1/19/23 11:00  68 21  97 Mechanical Ventilator 30.00 


 


1/19/23 10:42  115 12  95   30


 


1/19/23 10:00  78 14  100 Mechanical Ventilator 30.00 


 


1/19/23 09:51  79  133/53    


 


1/19/23 09:00  80 14  100 Mechanical Ventilator 30.00 


 


1/19/23 08:55  83  114/48    


 


1/19/23 08:55  83  114/48    


 


1/19/23 08:00  81 14  100 Mechanical Ventilator 30.00 


 


1/19/23 08:00     93 Mechanical Ventilator  30


 


1/19/23 08:00        30


 


1/19/23 08:00 36.7       


 


1/19/23 07:06  77 14  100   30


 


1/19/23 07:00  80 13  100 Mechanical Ventilator 30.00 


 


1/19/23 07:00  75      


 


1/19/23 06:00  81 13  99 Mechanical Ventilator 30.00 


 


1/19/23 05:00  90 13  98 Mechanical Ventilator 30.00 


 


1/19/23 04:52  81  132/56    


 


1/19/23 04:52  81  132/56    


 


1/19/23 04:09     99 Mechanical Ventilator  30


 


1/19/23 04:08 36.8     Mechanical Ventilator 30.00 


 


1/19/23 04:00  81 14  98 Mechanical Ventilator 30.00 


 


1/19/23 04:00        30


 


1/19/23 03:58  81  132/56    


 


1/19/23 03:00  81 16  99 Mechanical Ventilator 30.00 


 


1/19/23 02:30  87 14  99   30


 


1/19/23 02:00  82 14  99 Mechanical Ventilator 30.00 


 


1/19/23 01:00  86 14  98 Mechanical Ventilator 30.00 


 


1/19/23 01:00  81      














I & O 


 


 1/20/23





 07:00


 


Intake Total 2497.7 ml


 


Output Total 550 ml


 


Balance 1947.7 ml








Height & Weight


Height: '"


Weight: lbs. oz. kg; 25.68 BMI


Method:


General Appearance:  Anxious, Chronically ill, Severe Distress (Increased work 

of breathing and difficulty answering questions due to respiratory distress and 

being on BiPAP)


HEENT:  PERRL/EOMI, Moist Mucous Membranes


Neck:  Full Range of Motion, Normal Inspection, Non Tender, Supple


Respiratory:  Chest Non Tender, Lungs Clear, No Accessory Muscle Use, No 

Respiratory Distress (Appeared more comfortable on the BiPAP and was breathing 

easier), Decreased Breath Sounds (Improved air movement from when he first 

arrived)


Cardiovascular:  Regular Rate, Rhythm, Normal Peripheral Pulses, Tachycardia 

(Sinus tachycardia with heart rate 100 bpm)


Capillary Refill:  Greater Than 3 Seconds


Peripheral Pulses:  2+ Femoral (R), 2+ Femoral (L), 2+ Dorsalis Pedis (R), 2+ 

Left Dors-Pedis (L), 2+ Radial Pulses (R), 2+ Radial Pulses (L)


Gastrointestinal:  normal bowel sounds, soft


Extremity:  No Calf Tenderness, Pedal Edema (trace edmea )


Neurologic/Psychiatric:  Alert; No Oriented x3 (Unable to assess orientation as 

he was having difficulty answering questions between his respiratory distress 

and being placed on BiPAP)


Skin:  Cool, Diaphoresis, Pallor





Results


Lab


Laboratory Tests


1/18/23 04:00








1/19/23 04:15











Assessment/Plan


Assessment/Plan


Total time 20 minutes.


Critical Care:  Critically Ill Patient











KATE AGUILAR MD          Jan 20, 2023 00:25

## 2023-01-20 NOTE — DIAGNOSTIC IMAGING REPORT
Indication: Hypoxemia



Portable chest 1125P.M.



There is diffuse interstitial infiltrate in the right lung with

some areas of alveolar consolidation. This is new compared to

exam from 01/17/2023. ET tube has been removed. NG tube and

central line remain in place.



IMPRESSION: Worsening infiltrate right mid and lower lung.



Dictated by: 



  Dictated on workstation # [id number]

## 2023-01-20 NOTE — DIAGNOSTIC IMAGING REPORT
CHEST 1 VIEW, AP/PA ONLY



Indication: Hypoxia



Comparison: 01/19/2023



Findings:

Stable right IJ catheter. Stable enteric tube. Bibasilar and

right perihilar consolidations are unchanged. Layering pleural

effusions are worsened. No pneumothorax. Stable cardiac

silhouette.



Impression: 

1. Stable support devices.

2. Worsening of bilateral pleural effusions with unchanged

pulmonary consolidations.



Dictated by: 



  Dictated on workstation # [id number]

## 2023-01-20 NOTE — OCCUPATIONAL THERAPY EVAL
OT Evaluation-General/PLF


Medical Diagnosis


Admission Date


Jan 17, 2023 at 17:06


Medical Diagnosis:  acute resp. failure


Onset Date:  Jan 17, 2023





Therapy Diagnosis


Therapy Diagnosis:  decreased ADL status





Precautions


Precautions/Isolations:  Fall Prevention, Standard Precautions





Referral


Physician:  Jose Elias Adame Reason:  Evaluation/Treatment





Medical History


Pertinent Medical History:  COPD, CVA


Additional Medical History


CAD with stents, PVD, HTN, COPD, CHF, CVA (R hemiparesis), HLD


Current History


ED 1/17 with SOB, intubated. Extubated 1/19.





Social History


Current Living Status:  Spouse


Entry Into Home:  Stairs With Railing


 Steps Into Home:  2





ADL-Prior Level of Function


SCALE: Activities may be completed with or without assistive devices.





6-Indepedent-patient completes the activity by him/herself with no assistance 

from a helper.


5-Set-up or Clean-up Assistance-helper sets up or cleans up; patient completes 

activity. Armona assists only prior to or  


    following the activity.


4-Supervision or Touching Assistance-helper provides verbal cues and/or 

touching/steadying and/or contact guard assistance as patient completes act

ivity. Assistance may be provided   


    throughout the activity or intermittently.


3-Partial/Moderate Assistance-helper does LESS THAN HALF the effort. Armona 

lifts, holds or supports trunk or limbs, but provides less than half the effort.


2-Substantial/Maximal Assistance-helper does MORE THAN HALF the effort. Armona 

lifts or holds trunk or limbs and provides more than half the effort.


2-Duwakymak-dqjzzu does ALL the effort. Patient does none of the effort to 

complete the activity. Or, the assistance of 2 or more helpers is required for 

the patient to complete the  


    activity.


If activity was not attempted, code reason:


7-Patient Refused.


9-Not Applicable-not attempted and the patient did not perform the activity 

before the current illness, exacerbation or injury.


10-Not Attempted due to Environmental Limitations-(lack of equipment, weather 

restraints, etc.).


88-Not Attempted due to Medical Conditions or Safety Concerns.


ADL PLOF Comments


Pt reports requiring some assistance with ADLS due to RUE deficits from previous

stroke. He was not using AE at PLOF.  Pt reports wife assists with UE dressing 

and socks,


Self Care:  Needed Some Help


Functional Cognition:  Independent





OT Current Status


Subjective


Pt working with PT, agreeable to OT evaluation.





Mental Status/Objective


Attachments:  Pizano Catheter, IV, Oxygen (Vapotherm)





Current


Hand Dominance:  Right


Upper Extremity ROM


RUE decreased functional use. Flexion contractures at elbow. Pt unable to 

straighten fingers actively. Passively, OT is able to extend pt's fingers some, 

but not to full extension. Pt has some shoulder flexion, but unable to 

functionally use RUE.


LUE WFL, Shoulder flexion to approx 140 degrees, WFL at elbow/wrist/hand.


Upper Extremity Coordination


decreased RUE, LUE WFL


Upper Extremity Strength


LUE grossly 3+/5





ADL-Treatment


Eating (QC):  3 (Min A per RN report.)


On/Off Footwear (QC):  1





Other Treatments


Pt at EOB with PT (SBA per PT report for supine to sit). Pt able to sit a total 

of 7-8 mins, O2 saturation 90-92% at EOB. Pt participated in UE screen at EOB, 

decreased RUE function due to previous CVA, LUE WFL. Pt required min a sit to 

supine, and able to scoot himself up in bed with cues for direction. O2 

saturation decreased to 87% after laying down, taking several minutes to return 

back to 90%. Pt provided information about PLOF and home set up. Post tx, pt in 

bed, call light in reach and all needs met.





Education


OT Patient Education:  Correct positioning, Energy conservation, Modified ADL 

techniques, Progress toward Goal/Update tx plan, Purpose of tx/functional 

activities, Rehab process


Teaching Recipient:  Patient


Teaching Methods:  Discussion


Response to Teaching:  Verbalize Understanding





OT Long Term Goals


Long Term Goals


Time Frame:  Feb 3, 2023


Eating (QC):  5


Oral Hygiene (QC):  5


Toileting Hygiene (QC):  4


Shower/Bathe Self (QC):  3


Upper Body Dressing (QC):  3


Lower Body Dressing (QC):  3


Additional Goals:  1-Demonstrate ADL Tasks, 2-Verbalize Understanding, 3-

ImproveStrength/Yanni


1=Demonstrate adherence to instructed precautions during ADL tasks.


2=Patient will verbalize/demonstrate understanding of assistive 

devices/modifications for ADL.


3=Patient will improve strength/tolerance for activity to enable patient to 

perform ADL's.





OT Education/Plan


Problem List/Assessment


Assessment:  Decreased Activ Tolerance, Decreased UE Strength, Impaired Bed 

Mobility, Impaired Coordination, Impaired Funct Balance, Impaired I ADL's, 

Impaired Self-Care Skills, Restricted Funct UE ROM





Discharge Recommendations


Plan/Recommendations:  Continue POC





Treatment Plan/Plan of Care


Patient would benefit from OT for education, treatment and training to promote 

independence in ADL's, mobility, safety and/or upper extremity function for 

ADL's.


Plan of Care:  ADL Retraining, Functional Mobility, UE Funct Exercise/Act


Treatment Duration:  Feb 3, 2023


Frequency:  3 times per week (3-5 times per week)


Estimated Hrs Per Day:  .25 hour per day


Rehab Potential:  Fair





Time


Start Time:  11:00


Stop Time:  11:15


DATE:  Jan 20, 2023


Total Time Billed (hr/min):  15


Billed Treatment Time


1, RELL UNDERWOOD OT          Jan 20, 2023 11:31

## 2023-01-20 NOTE — TELE-ICU PROGRESS NOTE
Subjective


Date Seen by a Provider:  Jan 20, 2023


Time Seen by a Provider:  09:52


Subjective/Events-last exam











(Tele-ICU Physician , Progress Note )


Service provided via interactive audio and video telecommunications  E-CARE 

system to a patient admitted to ICU bed in [facility].


Patient is seen today due to persistent need of  ICU care





Available chart/ vitals / labs / Images reviewed


Video assessment done using   teleICU camera, rest of exam as per RN


Discussed with RN


Events overnight : 





Afebrile


hemodynamically stable


Respiratory - 


I/O = even 





Drips:  


Pressors- no








Consultants:


Hospital course:


(1/17) 63y/o M admitted with sepsis pneumonia vs COPD. Respiratory failure. 

Intubated on arrival to ICU. 


1/18 - very agitated off sedation 


1/19-  OFF pressors ,AC 14, Vt 550, FiO2 30% +6 (


1/19) EXTUBATED, few hours latter  went into pulm edema ->  diuresis 2l , on VT 

30L 60%











A/P


Acute resp failure , hypoxic , hypercarbic


( AECOPD, CAP , ? CHF 


- in resp distress despite NIPPV - intubated on arrival to ICU 


-intubated 1/17 - 1/19) EXTUBATED, few hours latter  went into pulm edema ->  

diuresis 2l , on VT 30L 60%


-  will recheck cxr , ECHO , cont abg





CHF, suspected with acue hypoxia 1/20


- diuresis 2l 


- ECHO pending








PNA suspected , CAP ,  RML 


- abx initiated ,  changed to zosyn 1/20 with worsenign RIGHT  side 1/20 


( NEG flu , covid) 





CAD


- s/p stenting  in past 


-EF reported 55% 2022





thrombocytopenia 


 - asummed  delutional - monitor for now , was not on any heparins sine 

admisssion 


- stable , will start lovenox 1/20





Anemia


- mimimal  blood on NG - will cont PPI bid 





Nutrition 


- po 





H/o ETOHuse / ? abuse - as per family report  " not recently"


 - starting on thiamine , folate 





S/p CVA 


- will re-assess with spech  tx given R PNA 





Shock


- off all pressors - RESOLVED 





Lines :   R IJ 1/17    , (Central Line Necessity Reviewed)


Pizano: +


OG:


Nutrition: po 


Analgesia:


Anxiety/ delirium








VTE Prophylaxis:  SCD , lov 1/20


Stress Ulcer Prophylaxis: PPI bid 





Plans in collaboration with   bedside consultants and IM MDs.


Discussed with RN to reach out if any questions or concerns


A total of33 minutes of critical care time was devoted to this patient today, 

required to treat and/or prevent further deterioration of  critical care 

condition ( as above ) .





I am remotely monitoring this patient from another state.  I am unable to do the

bedside exam, and history/physical and pertinent information is taken from other

notes in the computer and bedside staff. .





Sepsis Event


Evaluation


Height, Weight, BMI


Height: '"


Weight: lbs. oz. kg; 24.10 BMI


Method:





Focused Exam


Lactate Level


1/17/23 17:52: Lactic Acid Level 4.37*H


1/17/23 19:40: Lactic Acid Level 3.19*H


1/17/23 22:10: Lactic Acid Level 1.93


Time of Focused Exam:  16:06





Exam


Exam


Patient acknowledged, consented, and participated in this virtual visit which 

was conducted using real time audio/video


Vital Signs








  Date Time  Temp Pulse Resp B/P (MAP) Pulse Ox O2 Delivery O2 Flow Rate FiO2


 


1/20/23 08:10     95 Vapotherm 30.00 50


 


1/20/23 08:00  97 21 161/74 (103) 94 Vapotherm 30.00 





       70.00 


 


1/20/23 07:15     92 Vapotherm 30.00 70


 


1/20/23 07:00  101      


 


1/20/23 07:00  98 27 155/94 (114) 93 Vapotherm 30.00 





       70.00 


 


1/20/23 06:56      Vapotherm 30.00 





       70.00 


 


1/20/23 06:00  98 16 164/78 (106) 94 Vapotherm 30.00 





    183/73 (109)   60.00 


 


1/20/23 05:49      Vapotherm 30.00 





       60.00 


 


1/20/23 05:00  96 16 156/74 (101) 94 Vapotherm 30.00 





    183/71 (108)   50.00 


 


1/20/23 04:00  97 22 158/73 (101) 94 Vapotherm 30.00 





    184/72 (109)   50.00 


 


1/20/23 04:00     94 Vapotherm 30.00 50


 


1/20/23 03:00  96 18 152/71 (98) 95 Vapotherm 30.00 





    179/72 (107)   50.00 


 


1/20/23 02:57 36.6     Vapotherm 30.00 





       50.00 


 


1/20/23 02:32      Vapotherm 30.00 





       60.00 


 


1/20/23 02:21     99 Vapotherm 30.00 90


 


1/20/23 02:00  98 17 156/77 (103) 99 Vapotherm 30.00 





    181/71 (107)   90.00 


 


1/20/23 01:10     96 Vapotherm 30.00 





       90.00 


 


1/20/23 01:00  96      


 


1/20/23 01:00  98 20 155/75 (101) 99 Vapotherm 40.00 





    183/75 (111)   100.00 


 


1/20/23 00:10     95 Vapotherm 40.00 100


 


1/20/23 00:01     92 Vapotherm 40.00 





       100.00 


 


1/20/23 00:00  105 29 155/75 (101) 99 Vapotherm 30.00 





    180/77 (111)   100.00 


 


1/19/23 23:39     90 Vapotherm 30.00 





       100.00 


 


1/19/23 23:19 36.8     Vapotherm 30.00 





       80.00 


 


1/19/23 23:00  101 21 139/79 (99) 99 Vapotherm 30.00 





    181/75 (110)   100.00 


 


1/19/23 22:40     93 Vapotherm 30.00 100


 


1/19/23 22:31     93 Vapotherm 30.00 





       100.00 


 


1/19/23 22:15     88 High Flow N/C 10.00 


 


1/19/23 22:00     90 High Flow N/C 8.00 


 


1/19/23 22:00  112 19 151/74 (99) 97 High Flow N/C 8.00 





    168/76 (106)    


 


1/19/23 21:52     93 Nasal Cannula 5.00 


 


1/19/23 21:00  105 16 161/106 (124) 95 High Flow N/C 5.00 





    146/64 (91)    


 


1/19/23 20:05     94 High Flow N/C 5.00 


 


1/19/23 20:00 36.6 109 17 122/70 (87) 95   


 


1/19/23 19:48      High Flow N/C 5.00 


 


1/19/23 19:00  112 29 131/69 (89) 93 Nasal Cannula 5.00 


 


1/19/23 19:00 36.6       


 


1/19/23 19:00  114      


 


1/19/23 19:00  112 29 131/69 (89) 93 Nasal Cannula 5.00 


 


1/19/23 18:06 36.9 114   96   


 


1/19/23 18:00  104 20 132/113 (119) 93 Nasal Cannula 5.00 


 


1/19/23 17:20      Nasal Cannula 5.00 


 


1/19/23 17:00  108 14 151/81 (104) 97 Nasal Cannula 5.00 


 


1/19/23 16:30  112  138/82    


 


1/19/23 16:00     96 Nasal Cannula 5.00 


 


1/19/23 16:00 36.9 115 20 138/82 (100) 97 Nasal Cannula 5.00 


 


1/19/23 16:00 36.9 114 18 133/73 (93) 96   


 


1/19/23 15:00  113 29 129/77 (94) 96 Nasal Cannula 5.00 


 


1/19/23 14:00  114 16 120/70 (87) 99 Nasal Cannula 5.00 


 


1/19/23 13:55  105  139/63    


 


1/19/23 13:00  115 15  97 Nasal Cannula 5.00 


 


1/19/23 12:43  122      


 


1/19/23 12:00  75 21  93 Nasal Cannula 5.00 


 


1/19/23 12:00     94 Nasal Cannula 5.00 


 


1/19/23 11:00  68 21  97 Mechanical Ventilator 30.00 


 


1/19/23 10:42  115 12  95   30


 


1/19/23 10:00  78 14  100 Mechanical Ventilator 30.00 














I & O 


 


 1/20/23





 07:00


 


Intake Total 3057.7 ml


 


Output Total 2850 ml


 


Balance 207.7 ml








Height & Weight


Height: '"


Weight: lbs. oz. kg; 24.10 BMI


Method:


General Appearance:  Anxious, Chronically ill, Severe Distress (Increased work 

of breathing and difficulty answering questions due to respiratory distress and 

being on BiPAP)


HEENT:  PERRL/EOMI, Moist Mucous Membranes


Neck:  Full Range of Motion, Normal Inspection, Non Tender, Supple


Respiratory:  Chest Non Tender, Lungs Clear, No Accessory Muscle Use, No 

Respiratory Distress (Appeared more comfortable on the BiPAP and was breathing 

easier), Decreased Breath Sounds (Improved air movement from when he first 

arrived)


Cardiovascular:  Regular Rate, Rhythm, Normal Peripheral Pulses, Tachycardia 

(Sinus tachycardia with heart rate 100 bpm)


Capillary Refill:  Greater Than 3 Seconds


Peripheral Pulses:  2+ Femoral (R), 2+ Femoral (L), 2+ Dorsalis Pedis (R), 2+ 

Left Dors-Pedis (L), 2+ Radial Pulses (R), 2+ Radial Pulses (L)


Gastrointestinal:  normal bowel sounds, soft


Extremity:  No Calf Tenderness, Pedal Edema (trace edmea )


Neurologic/Psychiatric:  Alert; No Oriented x3 (Unable to assess orientation as 

he was having difficulty answering questions between his respiratory distress 

and being placed on BiPAP)


Skin:  Cool, Diaphoresis, Pallor





Results


Lab


Laboratory Tests


1/19/23 04:15








1/20/23 04:30











Assessment/Plan


Assessment/Plan


1











TON MOHAMUD MD         Jan 20, 2023 09:52

## 2023-01-20 NOTE — PROGRESS NOTE - HOSPITALIST
LADARIUS CHRISTINA 1/20/23 1720:


Subjective


Subjective/Events-last exam


61 yo M with a history of COPD, CHF, HTN, CAD and previous stroke who presented 

to the ER on 1/17 for worsening SOB that began that day. He was found to be in 

acute hypoxic respiratory failure and placed on CPAP. He was transported to [facility] by EMS who noted a oxygen saturation of 82% on 100% CPAP as well as 

hypotension requiring. Due to continued repsiratory decline, he was intubated 

shortly after admission with vent settings at 500/14/6/30%. Pressors were 

continued to due persistent hypotension. CXR obtained this day demonstrated COPD

and a patchy infiltrate and the right lung field; vancomycin, azithromycin, 

duonebs, and methylprednisone were started. He was extubated and started on 

vapotherm on 1/19. At this time, he is comfortable and denies SOB or chest pain.





Focused Exam


Lactate Level


1/17/23 17:52: Lactic Acid Level 4.37*H


1/17/23 19:40: Lactic Acid Level 3.19*H


1/17/23 22:10: Lactic Acid Level 1.93





Time of Focused Exam:  16:06





Objective


Exam


Vital Signs





Vital Signs








  Date Time  Temp Pulse Resp B/P (MAP) Pulse Ox O2 Delivery O2 Flow Rate FiO2


 


1/20/23 16:04     95 Vapotherm 35.00 60


 


1/20/23 16:00  96 22 150/107 (121)    


 


1/20/23 15:55 36.8       





Capillary Refill : Greater Than 3 Seconds


Respiratory:  Other (faint crackles)


Cardiovascular:  Regular Rate, Rhythm


Gastrointestinal:  Normal Bowel Sounds


Neurologic/Psychiatric:  Alert, Oriented x3





Results/Procedures


Lab


Laboratory Tests


1/20/23 04:30








Patient resulted labs reviewed.





Assessment/Plan


Assessment and Plan


Assess & Plan/Chief Complaint





Acute on chronic respiratory failure 


- likely secondary to pneumonia and/or COPD exacerbation


- ABG 1/19 7.41/42/68


- stable on vapotherm since 1/19 (35.00)


- extubated on 1/19 


- intubated from 1/17-1/19 (500/14/6/30%)





Pneumonia 


- CXR 1/20 - worsening of bilateral effusions with unchanged pulmonary 

consolidations


- CXR 1/19 - worsening infiltrate of the right mid and lower lobe 


- No growth on blood and sputum cultures obtained 1/17-1/18


- Nasal cultures negative for MRSA


- piperacillin tazobactam since 1/20


- Vancomycin since 1/17


- Azithromycin since 1/17





COPD 


- duonebs since 1/17


- methylprednisone since 1/17 





Sepsis 


- above management 





Hypotension


- resolved as of 1/19 


- pressors from 1/17-1/19





Stroke with right hemiparesis (chronic) 





Hyperlipidemia (chronic) 





CAD (chronic)





TRACI WONG DO 1/21/23 0553:


Subjective


HPI/CC On Admission


Date Seen by Provider:  Jan 20, 2023


Time Seen by Provider:  11:00





Objective


Exam


General Appearance:  No Apparent Distress, WD/WN, Chronically ill


Respiratory:  Decreased Breath Sounds, Other (faint crackles)


Cardiovascular:  Regular Rate, Rhythm





Supervisory-Addendum Brief


Verification & Attestation


Participated in pt care:  history, MDM, physical


Personally performed:  exam, history, MDM, supervision of care


Care discussed with:  Medical Student


Procedures:  n/a


Results interpretation:  Verified all documentation


Verification and Attestation of Medical Student E/M Service





A medical student performed and documented this service in my presence. I 

reviewed and verified all information documented by the medical student and made

modifications to such information, when appropriate. I personally performed the 

physical exam and medical decision making. 





 Traci Wong, Jan 21, 2023,05:53











LADARIUS CHRISTINA                     Jan 20, 2023 17:20


TRACI WONG DO                Jan 21, 2023 05:53

## 2023-01-20 NOTE — PHYSICAL THERAPY EVALUATION
PT Evaluation-General


Medical Diagnosis


Admission Date


Jan 17, 2023 at 17:06


Medical Diagnosis:  acute resp. failure


Onset Date:  Jan 17, 2023





Therapy Diagnosis


Therapy Diagnosis:  impaired mobility





Precautions


Precautions/Isolations:  Fall Prevention, Standard Precautions





Referral


Physician:  Jose Elias


Reason for Referral:  Evaluation/Treatment





Medical History


Pertinent Medical History:  COPD, CVA


Additional Medical History


Past Medical History


Surgeries:  Coronary Stent, Vascular Surgery


Pneumonia, COPD


High Cholesterol, Hypertension


Stroke


Reviewed History:  Yes





Social History


Current Living Status:  Spouse


Entry Into Home:  Stairs With Railing


PT Steps Into Home:  2





Prior


Prior Level of Function


SCALE: Activities may be completed with or without assistive devices.





6-Indepedent-patient completes the activity by him/herself with no assistance 

from a helper.


5-Set-up or Clean-up Assistance-helper sets up or cleans up; patient completes 

activity. [place] assists only prior to or  


    following the activity.


4-Supervision or Touching Assistance-helper provides verbal cues and/or 

touching/steadying and/or contact guard assistance as patient completes 

activity. Assistance may be provided   


    throughout the activity or intermittently.


3-Partial/Moderate Assistance-helper does LESS THAN HALF the effort. [place] 

lifts, holds or supports trunk or limbs, but provides less than half the effort.


2-Substantial/Maximal Assistance-helper does MORE THAN HALF the effort. [place] 

lifts or holds trunk or limbs and provides more than half the effort.


7-Plsmqgizh-oilrdv does ALL the effort. Patient does none of the effort to 

complete the activity. Or, the assistance of 2 or more helpers is required for 

the patient to complete the  


    activity.


If activity was not attempted, code reason:


7-Patient Refused.


9-Not Applicable-not attempted and the patient did not perform the activity 

before the current illness, exacerbation or injury.


10-Not Attempted due to Environmental Limitations-(lack of equipment, weather 

restraints, etc.).


88-Not Attempted due to Medical Conditions or Safety Concerns.


Bed Mobility:  6


Transfers (B,C,W/C):  6


Gait:  6


Stairs:  6


Indoor Mobility (Ambulation):  Independent


Stairs:  Independent


Patient states he was ambulating on his own without assistive device.





PT Evaluation-Current


Subjective


Patient in bed pre tx, agrees to PT, has no complaints of pain.  Patient has 

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





Pt/Family Goals


to be independent at home





Objective


Patient Orientation:  Person, Place, Situation


Attachments:  NG Tube, SCD's, Oxygen (vapotherm), Pizano Catheter, IV





ROM/Strength


ROM Lower Extremities


WNL


Strength Lower Extremities


LLE grossly 4/5, RLE grossly 4-/5





Sensory


Hearing:  Functional


Sensation Right Lower Extremit:  Intact


Sensation Left Lower Extremity:  Intact





Transfers


Roll Left to Right (QC):  4


Sit to Lying (QC):  3


Lying to Sitting/Side of Bed(Q:  4


Patient needed SBA for supine to sit, he was able to sit for about 7-8 min, O2 

stayed at 90-92%, performed a couple of LE exercises.  Needed min assist for sit

to supine, was able to scoot himself up with cues for direction, O2 dropped to 

87% after laying back down and took several minutes to get back up to 90%





Balance


Sitting Static:  Good


Sitting Dynamic:  Good





Treatment


seated BLE exercise x20 (AP, LAQ)





Assessment/Needs


Patient in bed post tx with nurse call, phone, tray, all needs met.  Patient has

impaired mobility, strength, endurance, O2 was good with activity but dropped 

after laying back down.  Needs some assist getting back into bed.


Rehab Potential:  Fair





PT Long Term Goals


Long Term Goals


PT Long Term Goals Time Frame:  Jan 27, 2023


Roll Left & Right (QC):  6


Sit to Lying (QC):  6


Lying-Sitting on Side/Bed(QC):  6


Sit to Stand (QC):  4


Chair/Bed-to-Chair Xfer(QC):  4


Walk 10 feet (QC):  4





PT Plan


Problem List


Problem List:  Activity Tolerance, Functional Strength, Safety, Balance, Gait, 

Transfer, Bed Mobility, ROM





Treatment/Plan


Treatment Plan:  Continue Plan of Care


Treatment Plan:  Bed Mobility, Education, Functional Activity Yanni, Functional 

Strength, Gait, Safety, Therapeutic Exercise, Transfers


Treatment Duration:  Jan 27, 2023


Frequency:  6 times per week


Estimated Hrs Per Day:  .25 hour per day


Patient and/or Family Agrees t:  Yes





Safety Risks/Education


Patient Education:  Correct Positioning, Safety Issues


Teaching Recipient:  Patient


Teaching Methods:  Demonstration, Discussion


Response to Teaching:  Reinforcement Needed





Discharge Recommendations


Plan


Patient will perform bed mobility and transfer training, balance and endurance 

training, functional strengthening, stair training, gait training, and 

education, to improve functional mobility and independence at home.


Therapy Discharge Recommendati:  Scheduled Assistance, Home & Family, Post Acute

PT





Time


Time In:  1050


Time Out:  1107


DATE:  Jan 20, 2023


Total Billed Treatment Time:  17


Total Billed Treatment


1 visit


NORI GIBSON PT                Jan 20, 2023 11:18

## 2023-01-20 NOTE — ST DYSPHAGIA EVALUATION
Speech Evaluation-General


Medical Diagnosis


Acute Respiratory Failure


Onset Date:  Jan 17, 2023





Therapy Diagnosis


Therapy Diagnosis:  Mild Oral Dysphagia





Precautions


Precautions:  Fall, Pressure Ulcer, Aspiration


Precautions/Isolations:  Aspiration, Fall Prevention, Standard Precautions, 

Pressure Ulcer





Referral


Referring Physician:  Dr. Moctezuma


Reason for Referral:  Evaluation/Treatment





Medical History


Pertinent Medical History:  COPD, CVA


Reviewed History:  Yes





Social History


Current Living Status:  Spouse





Speech PLF/Current-Dysphagia


Prior Level of Function


The patient denied prior challenges or concerns with his oropharyngeal 

swallowing function. Upon additional questions by the clinician regarding his 

swallowing function following his previous stroke, the patient stated, "Oh yeah,

I have been through this before at , all the swallowing stuff." The clinician 

requested additional information, however, the patient was unable to provide 

further elaboration. The patient reported he consumes a regular consistency diet

with thin liquids at home.





Subjective


The patient was seated upright in his bed, awake and alert upon entrance to his 

room by the clinician. The patient greeted the clinician appropriately and was 

agreeable to participation in the clinical bedside swallowing evaluation. The 

patient is receiving Vapotherm at 40 lpm at 80% with SpO2% at 91%.





Cognitive Status


Patient Orientation:  Person, Place, Time, Situation





Oral Motor Skills


Dentition:  Edentalous


Current Food Consistancy:  Regular, Thin Liquids


Ability to Follow Directions:  Good


Oral Expression Ability:  No Impairment


Other Contributing Factors:  Nasogastric Tube





Voice


Voice Phonatory-Based Quality:  Normal


Voice Pitch:  Normal


Voice Loudness:  Normal





Face


Facial Symmetry:  Asymmetrical (Right Facial Droop)





Oral-Facial Assessment


Oral-Facial Dentition:  Normal


Labial Seal Description:  Weak


Smile:  Droops Right


Puff Cheeks:  Reduced Strength (Right)


Lingual Protrusion:  Normal


Lingual ROM:  Normal


Lingual Strength:  Normal


Volitional Dry Swallow:  Yes


Voluntary Cough:  Yes


Can Clear Throat Volitionally:  Yes





Dysphagia Evaluation


Consistencies Presented:  Regular, Thin Liquid, Pureed


The patient demonstrated prolonged mastication/mashing of the solid consistency,

requiring a thin liquid bolus for oral clearance.


Laryngeal impairments were not present throughout the evaluation. Laryngeal 

elevation was present to palpation.


The patient does not display overt s/s of suspected aspiration with P.O. trials 

of any kind. The patient's SpO2% remained stable throughout and following P.O. 

trials and the patient's vocal quality remained clear.


Dietary Recommendations:  SB6


Liquid Recommendations:  Thin





Recommendations:


- SB6 with thin liquids, as tolerated.


- Fully upright and alert for P.O. intake.


- Small, single bites and sips, only.


- Cease P.O. intake during periods of respiratory fatigue.


- Monitor for s/s of suspected aspiration with P.O. intake. If demonstrated, 

contact speech pathology.


- Speech pathology to continue to monitor the patient's tolerance of the current

diet consistency throughout acute hospitalization, as appropriate.





The patient remains at an elevated risk for aspiration due to his high oxygen 

needs (high flow nasal cannula at a rate of 40 lpm or high increases a patient's

risk for aspiration per evidence based research) and prior medical history 

(COPD). If concerns arise regarding possible aspiration, a video swallow would 

be appropriate. ST to closely monitor the patient for improvement. The results 

and recommendations were shared with the patient and the patient's RN 

immediately following completion of the study.


Dysphagia Evaluation Summary


The patient demonstrated mild oral dysphagia secondary to prolonged mastication 

of dry solid consistencies.





Speech Short Term Goals


Short Term Goals


Short Term Goals


1. The patient will display safe swallowing strategies with 80% accuracy, 

independently.


Time Frame-STG:  Five Days.





Speech Long Term Goals


Long Term Goals


1. The patient will tolerate the least restrictive diet consistency without s/s 

of suspected aspiration.


Time Frame:  One Week.





Speech-Plan


Treatment Plan


Speech Therapy Treatment Plan:  Continue Plan of Care


Treatment Duration:  Jan 27, 2023


Frequency:  4 times per week


Estimated Hrs Per Day:  .25 hour per day


Rehab Potential:  Guarded


Pt/Family Agrees to Plan:  Yes





Safety Risks/Education


Teaching Recipient:  Patient


Teaching Methods:  Discussion


Response to Teaching:  Verbalize Understanding, Reinforcement Needed


Education Topics Provided:  


Results, Recommendations, Plan of Care, Safe Swallowing Precautions





Time


Speech Therapy Time In:  11:25


Speech Therapy Time Out:  11:52


DATE:  Jan 20, 2023


Total Billed Time:  27


Billed Treatment Time


1, RUTH GUEVARA ELIZABETH ST               Jan 20, 2023 13:52

## 2023-01-21 LAB
ALBUMIN SERPL-MCNC: 3 GM/DL (ref 3.2–4.5)
ALP SERPL-CCNC: 55 U/L (ref 40–136)
ALT SERPL-CCNC: 45 U/L (ref 0–55)
BASOPHILS # BLD AUTO: 0 10^3/UL (ref 0–0.1)
BASOPHILS NFR BLD AUTO: 0 % (ref 0–10)
BILIRUB SERPL-MCNC: 0.8 MG/DL (ref 0.1–1)
BUN/CREAT SERPL: 22
CALCIUM SERPL-MCNC: 8 MG/DL (ref 8.5–10.1)
CHLORIDE SERPL-SCNC: 104 MMOL/L (ref 98–107)
CO2 SERPL-SCNC: 23 MMOL/L (ref 21–32)
CREAT SERPL-MCNC: 0.68 MG/DL (ref 0.6–1.3)
EOSINOPHIL # BLD AUTO: 0 10^3/UL (ref 0–0.3)
EOSINOPHIL NFR BLD AUTO: 0 % (ref 0–10)
GFR SERPLBLD BASED ON 1.73 SQ M-ARVRAT: 106 ML/MIN
GLUCOSE SERPL-MCNC: 122 MG/DL (ref 70–105)
HCT VFR BLD CALC: 36 % (ref 40–54)
HGB BLD-MCNC: 12 G/DL (ref 13.3–17.7)
LYMPHOCYTES # BLD AUTO: 0.5 10^3/UL (ref 1–4)
LYMPHOCYTES NFR BLD AUTO: 6 % (ref 12–44)
MAGNESIUM SERPL-MCNC: 2 MG/DL (ref 1.6–2.4)
MANUAL DIFFERENTIAL PERFORMED BLD QL: NO
MCH RBC QN AUTO: 33 PG (ref 25–34)
MCHC RBC AUTO-ENTMCNC: 34 G/DL (ref 32–36)
MCV RBC AUTO: 98 FL (ref 80–99)
MONOCYTES # BLD AUTO: 0.5 10^3/UL (ref 0–1)
MONOCYTES NFR BLD AUTO: 6 % (ref 0–12)
NEUTROPHILS # BLD AUTO: 6.7 10^3/UL (ref 1.8–7.8)
NEUTROPHILS NFR BLD AUTO: 87 % (ref 42–75)
PHOSPHATE SERPL-MCNC: 3 MG/DL (ref 2.3–4.7)
PLATELET # BLD: 89 10^3/UL (ref 130–400)
PMV BLD AUTO: 10.6 FL (ref 9–12.2)
POTASSIUM SERPL-SCNC: 3.5 MMOL/L (ref 3.6–5)
PROT SERPL-MCNC: 5.5 GM/DL (ref 6.4–8.2)
SODIUM SERPL-SCNC: 139 MMOL/L (ref 135–145)
WBC # BLD AUTO: 7.8 10^3/UL (ref 4.3–11)

## 2023-01-21 RX ADMIN — FOLIC ACID SCH MG: 1 TABLET ORAL at 08:14

## 2023-01-21 RX ADMIN — PANTOPRAZOLE SODIUM SCH MG: 40 INJECTION, POWDER, FOR SOLUTION INTRAVENOUS at 08:14

## 2023-01-21 RX ADMIN — METOPROLOL TARTRATE SCH MG: 1 INJECTION, SOLUTION INTRAVENOUS at 05:55

## 2023-01-21 RX ADMIN — METOPROLOL TARTRATE SCH MG: 1 INJECTION, SOLUTION INTRAVENOUS at 18:04

## 2023-01-21 RX ADMIN — METHYLPREDNISOLONE SODIUM SUCCINATE SCH MG: 40 INJECTION, POWDER, FOR SOLUTION INTRAMUSCULAR; INTRAVENOUS at 21:50

## 2023-01-21 RX ADMIN — Medication SCH MLS/HR: at 16:59

## 2023-01-21 RX ADMIN — IPRATROPIUM BROMIDE AND ALBUTEROL SULFATE SCH ML: .5; 3 SOLUTION RESPIRATORY (INHALATION) at 21:07

## 2023-01-21 RX ADMIN — IPRATROPIUM BROMIDE AND ALBUTEROL SULFATE SCH ML: .5; 3 SOLUTION RESPIRATORY (INHALATION) at 15:29

## 2023-01-21 RX ADMIN — VASOPRESSIN SCH MLS/HR: 20 INJECTION INTRAVENOUS at 21:33

## 2023-01-21 RX ADMIN — VASOPRESSIN SCH MLS/HR: 20 INJECTION INTRAVENOUS at 09:34

## 2023-01-21 RX ADMIN — POTASSIUM CHLORIDE SCH MLS/HR: 200 INJECTION, SOLUTION INTRAVENOUS at 04:11

## 2023-01-21 RX ADMIN — MAGNESIUM SULFATE IN DEXTROSE SCH MLS/HR: 10 INJECTION, SOLUTION INTRAVENOUS at 04:11

## 2023-01-21 RX ADMIN — Medication SCH MG: at 05:55

## 2023-01-21 RX ADMIN — IPRATROPIUM BROMIDE AND ALBUTEROL SULFATE SCH ML: .5; 3 SOLUTION RESPIRATORY (INHALATION) at 02:35

## 2023-01-21 RX ADMIN — ENOXAPARIN SODIUM SCH MG: 100 INJECTION SUBCUTANEOUS at 08:14

## 2023-01-21 RX ADMIN — IPRATROPIUM BROMIDE AND ALBUTEROL SULFATE SCH ML: .5; 3 SOLUTION RESPIRATORY (INHALATION) at 10:29

## 2023-01-21 RX ADMIN — SODIUM CHLORIDE SCH MLS/HR: 900 INJECTION INTRAVENOUS at 17:15

## 2023-01-21 RX ADMIN — PANTOPRAZOLE SODIUM SCH MG: 40 INJECTION, POWDER, FOR SOLUTION INTRAVENOUS at 21:45

## 2023-01-21 RX ADMIN — METOPROLOL TARTRATE SCH MG: 1 INJECTION, SOLUTION INTRAVENOUS at 12:11

## 2023-01-21 RX ADMIN — SODIUM CHLORIDE SCH MLS/HR: 900 INJECTION, SOLUTION INTRAVENOUS at 18:02

## 2023-01-21 RX ADMIN — SODIUM CHLORIDE SCH MLS/HR: 900 INJECTION INTRAVENOUS at 00:06

## 2023-01-21 RX ADMIN — METOPROLOL TARTRATE SCH MG: 1 INJECTION, SOLUTION INTRAVENOUS at 00:06

## 2023-01-21 RX ADMIN — METHYLPREDNISOLONE SODIUM SUCCINATE SCH MG: 40 INJECTION, POWDER, FOR SOLUTION INTRAMUSCULAR; INTRAVENOUS at 05:55

## 2023-01-21 RX ADMIN — SODIUM CHLORIDE SCH MLS/HR: 900 INJECTION INTRAVENOUS at 08:14

## 2023-01-21 RX ADMIN — POTASSIUM CHLORIDE SCH MEQ: 1500 TABLET, EXTENDED RELEASE ORAL at 04:11

## 2023-01-21 NOTE — TELE-ICU PROGRESS NOTE
Subjective


Date Seen by a Provider:  Jan 21, 2023


Time Seen by a Provider:  09:56


Subjective/Events-last exam











(Tele-ICU Physician , Progress Note )


Service provided via interactive audio and video telecommunications  E-CARE 

system to a patient admitted to ICU bed in [facility].


Patient is seen today due to persistent need of  ICU care





Available chart/ vitals / labs / Images reviewed


Video assessment done using   teleICU camera, rest of exam as per RN


Discussed with RN


Events overnight : 





Afebrile


hemodynamically stable


Respiratory -  4l 


I/O =   neg 5L 





Drips: NS 10


Pressors- no








Consultants:


Hospital course:


(1/17) 63y/o M admitted with sepsis pneumonia vs COPD. Respiratory failure. 

Intubated on arrival to ICU. 


1/18 - very agitated off sedation 


1/19-  OFF pressors ,AC 14, Vt 550, FiO2 30% +6 (


1/19) EXTUBATED, few hours latter  went into pulm edema ->  diuresis 2l , on VT 

30L 60%


1/20 - VT -> lasix -> 5L UO 


1.21 - 4 L NC 











A/P


Acute resp failure , hypoxic , hypercarbic


( AECOPD, CAP , ? CHF 


- in resp distress despite NIPPV - intubated on arrival to ICU 


-intubated 1/17 - 1/19) EXTUBATED, few hours latter  went into pulm edema ->  

diuresis 2l , on VT 30L 60%


-  ECHO  pending , but after 5 L uo  patient is back to NC 4 L ,


-cont abg





CHF, suspected with acue hypoxia 1/20


- diuresis 2l +5L --> imprived FiO2 


- ECHO pending








PNA suspected , CAP ,  RML 


- abx initiated ,  changed to zosyn 1/20 with worsenign RIGHT  side 1/20 


( NEG flu , covid) 





AECOPD- decrease steroid dose 





CAD


- s/p stenting  in past 


-EF reported 55% 2022





thrombocytopenia 


 - asummed  delutional - monitor for now , was not on any heparins since 

admisssion 


- stable , will start lovenox 1/20 - PLT 87 today - will monitor 





Anemia


- mimimal  blood on NG - will cont PPI bid 





Nutrition 


- po  , speach eval 1/20  done 





H/o ETOHuse / ? abuse - as per family report  " not recently"


 - starting on thiamine , folate 





S/p CVA 


- will re-assess with spech  tx given R PNA 





Shock


- off all pressors - RESOLVED 





Lines :   R IJ 1/17    , (Central Line Necessity Reviewed)


Pizano: +


OG:


Nutrition: po 


Analgesia:


Anxiety/ delirium








VTE Prophylaxis:  SCD , lov 1/20


Stress Ulcer Prophylaxis: PPI bid 





Plans in collaboration with   bedside consultants and IM MDs.


Discussed with RN to reach out if any questions or concerns


A total of33 minutes of critical care time was devoted to this patient today, 

required to treat and/or prevent further deterioration of  critical care 

condition ( as above ) .





I am remotely monitoring this patient from another state.  I am unable to do the

bedside exam, and history/physical and pertinent information is taken from other

notes in the computer and bedside staff. .





Sepsis Event


Evaluation


Height, Weight, BMI


Height: '"


Weight: lbs. oz. kg; 24.73 BMI


Method:





Focused Exam


Time of Focused Exam:  16:06





Exam


Exam


Patient acknowledged, consented, and participated in this virtual visit which 

was conducted using real time audio/video


Vital Signs








  Date Time  Temp Pulse Resp B/P (MAP) Pulse Ox O2 Delivery O2 Flow Rate FiO2


 


1/21/23 09:00  105 28 154/78 (103) 94 High Flow N/C 4.00 


 


1/21/23 08:00  98 26 165/79 (107) 93 High Flow N/C 4.00 


 


1/21/23 08:00     94 High Flow N/C 4.00 


 


1/21/23 07:00  90 20 167/78 (107) 90 High Flow N/C 4.00 


 


1/21/23 07:00  92      


 


1/21/23 06:00  93 20 163/80 (107) 97 High Flow N/C 4.00 


 


1/21/23 05:00  89 19 168/79 (108) 98 High Flow N/C 4.00 


 


1/21/23 04:00  92 18 170/82 (111) 97 High Flow N/C 4.00 


 


1/21/23 03:30 36.7 98 18  96 High Flow N/C 4.00 


 


1/21/23 03:30     96 High Flow N/C 4.00 


 


1/21/23 03:00  89 16 158/78 (104) 97 High Flow N/C 4.00 


 


1/21/23 02:38      High Flow N/C 4.00 


 


1/21/23 02:35     94  15.00 30


 


1/21/23 02:00  98 18 144/72 (96) 93 Vapotherm 20.00 





       35.00 


 


1/21/23 01:00  100      


 


1/21/23 01:00  92 20 160/77 (104) 94 Vapotherm 20.00 





       35.00 


 


1/21/23 00:00  93 16 158/77 (104) 95 Vapotherm 20.00 





       35.00 


 


1/21/23 00:00 36.7 96 22 158/77 (104) 96 Vapotherm 15.00 





       30.00 


 


1/21/23 00:00     96 Vapotherm 15.00 30


 


1/20/23 23:00  93 18 159/78 (105) 96 Vapotherm 20.00 





       35.00 


 


1/20/23 22:21      Vapotherm 20.00 





       35.00 


 


1/20/23 22:00  94 20 159/75 (103) 97 Vapotherm 40.00 





       25.00 


 


1/20/23 21:44     97 Vapotherm 25.00 40


 


1/20/23 21:00  93 24 159/75 (103) 97 Vapotherm 40.00 





       25.00 


 


1/20/23 20:00  98 24 156/77 (103) 95 Vapotherm 40.00 





       25.00 


 


1/20/23 19:34      Vapotherm 40.00 





       25.00 


 


1/20/23 19:30     96 Vapotherm 25.00 40


 


1/20/23 19:00  100      


 


1/20/23 19:00 37.6 101 18 147/72 (97) 97 Vapotherm 45.00 





    Arterial Line   30.00 


 


1/20/23 18:00  105 19 158/92 (114) 94 Vapotherm 40.00 





       80.00 


 


1/20/23 17:00  101 21 167/84 (111) 94 Vapotherm 40.00 





       80.00 


 


1/20/23 16:04     95 Vapotherm 35.00 60


 


1/20/23 16:00  96 22 150/107 (121) 96 Vapotherm 40.00 





       80.00 


 


1/20/23 15:55 36.8       


 


1/20/23 15:38      Vapotherm 35.00 





       60.00 


 


1/20/23 15:00  94 24 158/77 (104) 96 Vapotherm 40.00 





       80.00 


 


1/20/23 14:38     97 Vapotherm 30.00 70


 


1/20/23 14:00  102 22 160/114 (129) 96 Vapotherm 40.00 





       80.00 


 


1/20/23 13:00  101 25 147/111 (123) 96 Vapotherm 40.00 





       80.00 


 


1/20/23 12:59  105      


 


1/20/23 12:00  103 13 160/80 (106) 96 Vapotherm 40.00 





       80.00 


 


1/20/23 11:56 37.0       


 


1/20/23 11:53     90 Vapotherm 40.00 80


 


1/20/23 11:27      Vapotherm 40.00 





       80.00 


 


1/20/23 11:00  110 26 159/83 (108) 91 Vapotherm 30.00 





       70.00 


 


1/20/23 10:00  104 25 170/83 (112) 91 Vapotherm 30.00 





       70.00 














I & O 


 


 1/21/23





 07:00


 


Intake Total 2180 ml


 


Output Total 3755 ml


 


Balance -1575 ml








Height & Weight


Height: '"


Weight: lbs. oz. kg; 24.73 BMI


Method:


General Appearance:  No Apparent Distress, WD/WN, Chronically ill


HEENT:  PERRL/EOMI, Moist Mucous Membranes


Neck:  Full Range of Motion, Normal Inspection, Non Tender, Supple


Respiratory:  Decreased Breath Sounds, Other (faint crackles)


Cardiovascular:  Regular Rate, Rhythm


Capillary Refill:  Less Than 3 Seconds


Peripheral Pulses:  2+ Femoral (R), 2+ Femoral (L), 2+ Dorsalis Pedis (R), 2+ 

Left Dors-Pedis (L), 2+ Radial Pulses (R), 2+ Radial Pulses (L)


Gastrointestinal:  normal bowel sounds, soft


Extremity:  No Calf Tenderness, Pedal Edema (trace edmea )


Neurologic/Psychiatric:  Alert, Oriented x3


Skin:  Cool, Diaphoresis, Pallor





Results


Lab


Laboratory Tests


1/20/23 04:30








1/21/23 03:38











Assessment/Plan


Assessment/Plan


1











TON MOHAMUD MD         Jan 21, 2023 09:57

## 2023-01-21 NOTE — PHYSICAL THERAPY DAILY NOTE
PT Daily Note-Current


Subjective


States that he is doing okay.





Pain


Section J - Health Conditions


1. Rarely or not at all


2. Occasionally


3. Frequently


4. Almost constantly


8. Unable to answer


Pain Effect on Sleep:  2


Pain Interference with Therapy:  2


Pain Interference w/Day-to-Day:  2





Transfers


SCALE: Activities may be completed with or without assistive devices.





6-Indepedent-patient completes the activity by him/herself with no assistance 

from a helper.


5-Set-up or Clean-up Assistance-helper sets up or cleans up; patient completes 

activity. [place] assists only prior to or  


    following the activity.


4-Supervision or Touching Assistance-helper provides verbal cues and/or 

touching/steadying and/or contact guard assistance as patient completes 

activity. Assistance may be provided   


    throughout the activity or intermittently.


3-Partial/Moderate Assistance-helper does LESS THAN HALF the effort. [place] 

lifts, holds or supports trunk or limbs, but provides less than half the effort.


2-Substantial/Maximal Assistance-helper does MORE THAN HALF the effort. [place] 

lifts or holds trunk or limbs and provides more than half the effort.


1-Tvlmgjnwv-atohcw does ALL the effort. Patient does none of the effort to 

complete the activity. Or, the assistance of 2 or more helpers is required for 

the patient to complete the  


    activity.


If activity was not attempted, code reason:


7-Patient Refused.


9-Not Applicable-not attempted and the patient did not perform the activity 

before the current illness, exacerbation or injury.


10-Not Attempted due to Environmental Limitations-(lack of equipment, weather 

restraints, etc.).


88-Not Attempted due to Medical Conditions or Safety Concerns.


Roll Left & Right (QC):  5


Sit to Lying (QC):  5


Lying to Sitting/Side of Bed(Q:  5





Exercises


Seated Therapy Exercises:  LE Protocol


Seated Reps:  20





Assessment


Current Status:  Good Progress


Patient's O2 dropped to 87 during sitting but he was able to improve with deep 

breathing.





PT Long Term Goals


Long Term Goals


PT Long Term Goals Time Frame:  Jan 27, 2023


Roll Left & Right (QC):  6


Sit to Lying (QC):  6


Lying-Sitting on Side/Bed(QC):  6


Sit to Stand (QC):  4


Chair/Bed-to-Chair Xfer(QC):  4


Walk 10 feet (QC):  4





PT Plan


Treatment/Plan


Treatment Plan:  Continue Plan of Care


Treatment Plan:  Bed Mobility, Education, Functional Activity Yanni, Functional 

Strength, Gait, Safety, Therapeutic Exercise, Transfers


Treatment Duration:  Jan 27, 2023


Frequency:  6 times per week


Estimated Hrs Per Day:  .25 hour per day


Patient and/or Family Agrees t:  Yes





Time


Time In:  0900


Time Out:  0910


DATE:  Jan 21, 2023


Total Billed Treatment Time:  10


Total Billed Treatment


1, FA x 10'











GALO NAVARRO PT            Jan 21, 2023 09:15

## 2023-01-21 NOTE — PROGRESS NOTE - HOSPITALIST
Subjective


HPI/CC On Admission


Date Seen by Provider:  Jan 21, 2023


Time Seen by Provider:  11:00


Subjective/Events-last exam


Improved status


Vapotherm DC now on NC


Very frail


Needs to remain in ICU


Lung status is overall very poor





Review of Systems


General:  Fatigue


Pulmonary:  Dyspnea





Focused Exam


Time of Focused Exam:  16:06





Objective


Exam


Vital Signs





Vital Signs








  Date Time  Temp Pulse Resp B/P (MAP) Pulse Ox O2 Delivery O2 Flow Rate FiO2


 


1/21/23 13:00  92 23 158/81 (106) 93 High Flow N/C 4.00 


 


1/21/23 12:00 36.7       


 


1/21/23 02:35        30





Capillary Refill : Less Than 3 Seconds


General Appearance:  No Apparent Distress, WD/WN, Chronically ill


Respiratory:  No Accessory Muscle Use, No Respiratory Distress, Decreased Breath

Sounds


Cardiovascular:  Regular Rate, Rhythm


Neurologic/Psychiatric:  Alert, Oriented x3





Results/Procedures


Lab


Laboratory Tests


1/21/23 03:38








Patient resulted labs reviewed.





Assessment/Plan


Assessment and Plan


Assess & Plan/Chief Complaint








Assess & Plan/Chief Complaint





Acute on chronic respiratory failure 


- likely secondary to pneumonia and/or COPD exacerbation


- ABG 1/19 7.41/42/68


- stable on vapotherm since 1/19 (35.00)


- extubated on 1/19 


- intubated from 1/17-1/19 (500/14/6/30%)





Pneumonia 


- CXR 1/20 - worsening of bilateral effusions with unchanged pulmonary 

consolidations


- CXR 1/19 - worsening infiltrate of the right mid and lower lobe 


- No growth on blood and sputum cultures obtained 1/17-1/18


- Nasal cultures negative for MRSA


- piperacillin tazobactam since 1/20


- Vancomycin since 1/17


- Azithromycin since 1/17





COPD 


- duonebs since 1/17


- methylprednisone since 1/17 





Sepsis 


- above management 





Hypotension


- resolved as of 1/19 


- pressors from 1/17-1/19





Stroke with right hemiparesis (chronic) 





Hyperlipidemia (chronic) 





CAD (chronic)





Critical Care


Critically Ill Patient











CANDIS WONG DO                Jan 21, 2023 06:55

## 2023-01-22 VITALS — DIASTOLIC BLOOD PRESSURE: 77 MMHG | SYSTOLIC BLOOD PRESSURE: 165 MMHG

## 2023-01-22 VITALS — DIASTOLIC BLOOD PRESSURE: 78 MMHG | SYSTOLIC BLOOD PRESSURE: 155 MMHG

## 2023-01-22 VITALS — SYSTOLIC BLOOD PRESSURE: 166 MMHG | DIASTOLIC BLOOD PRESSURE: 75 MMHG

## 2023-01-22 VITALS — SYSTOLIC BLOOD PRESSURE: 163 MMHG | DIASTOLIC BLOOD PRESSURE: 78 MMHG

## 2023-01-22 LAB
ALBUMIN SERPL-MCNC: 2.9 GM/DL (ref 3.2–4.5)
ALP SERPL-CCNC: 52 U/L (ref 40–136)
ALT SERPL-CCNC: 43 U/L (ref 0–55)
BASOPHILS # BLD AUTO: 0 10^3/UL (ref 0–0.1)
BASOPHILS NFR BLD AUTO: 0 % (ref 0–10)
BILIRUB SERPL-MCNC: 0.8 MG/DL (ref 0.1–1)
BUN/CREAT SERPL: 19
CALCIUM SERPL-MCNC: 7.9 MG/DL (ref 8.5–10.1)
CHLORIDE SERPL-SCNC: 103 MMOL/L (ref 98–107)
CO2 SERPL-SCNC: 24 MMOL/L (ref 21–32)
CREAT SERPL-MCNC: 0.62 MG/DL (ref 0.6–1.3)
EOSINOPHIL # BLD AUTO: 0 10^3/UL (ref 0–0.3)
EOSINOPHIL NFR BLD AUTO: 0 % (ref 0–10)
GFR SERPLBLD BASED ON 1.73 SQ M-ARVRAT: 109 ML/MIN
GLUCOSE SERPL-MCNC: 126 MG/DL (ref 70–105)
HCT VFR BLD CALC: 36 % (ref 40–54)
HGB BLD-MCNC: 12.3 G/DL (ref 13.3–17.7)
LYMPHOCYTES # BLD AUTO: 0.6 10^3/UL (ref 1–4)
LYMPHOCYTES NFR BLD AUTO: 8 % (ref 12–44)
MAGNESIUM SERPL-MCNC: 1.9 MG/DL (ref 1.6–2.4)
MANUAL DIFFERENTIAL PERFORMED BLD QL: NO
MCH RBC QN AUTO: 33 PG (ref 25–34)
MCHC RBC AUTO-ENTMCNC: 34 G/DL (ref 32–36)
MCV RBC AUTO: 96 FL (ref 80–99)
MONOCYTES # BLD AUTO: 0.5 10^3/UL (ref 0–1)
MONOCYTES NFR BLD AUTO: 7 % (ref 0–12)
NEUTROPHILS # BLD AUTO: 6 10^3/UL (ref 1.8–7.8)
NEUTROPHILS NFR BLD AUTO: 85 % (ref 42–75)
PHOSPHATE SERPL-MCNC: 3 MG/DL (ref 2.3–4.7)
PLATELET # BLD: 95 10^3/UL (ref 130–400)
PMV BLD AUTO: 10.4 FL (ref 9–12.2)
POTASSIUM SERPL-SCNC: 3.9 MMOL/L (ref 3.6–5)
PROT SERPL-MCNC: 5.3 GM/DL (ref 6.4–8.2)
SODIUM SERPL-SCNC: 136 MMOL/L (ref 135–145)
WBC # BLD AUTO: 7 10^3/UL (ref 4.3–11)

## 2023-01-22 RX ADMIN — METOPROLOL TARTRATE SCH MG: 1 INJECTION, SOLUTION INTRAVENOUS at 06:15

## 2023-01-22 RX ADMIN — MAGNESIUM SULFATE IN DEXTROSE SCH MLS/HR: 10 INJECTION, SOLUTION INTRAVENOUS at 05:49

## 2023-01-22 RX ADMIN — CLONIDINE HYDROCHLORIDE PRN MG: 0.1 TABLET ORAL at 20:43

## 2023-01-22 RX ADMIN — IPRATROPIUM BROMIDE AND ALBUTEROL SULFATE SCH ML: .5; 3 SOLUTION RESPIRATORY (INHALATION) at 15:30

## 2023-01-22 RX ADMIN — VASOPRESSIN SCH MLS/HR: 20 INJECTION INTRAVENOUS at 07:53

## 2023-01-22 RX ADMIN — DOCUSATE SODIUM SCH MG: 100 CAPSULE ORAL at 19:44

## 2023-01-22 RX ADMIN — FOLIC ACID SCH MG: 1 TABLET ORAL at 08:43

## 2023-01-22 RX ADMIN — METHYLPREDNISOLONE SODIUM SUCCINATE SCH MG: 40 INJECTION, POWDER, FOR SOLUTION INTRAMUSCULAR; INTRAVENOUS at 08:43

## 2023-01-22 RX ADMIN — Medication SCH MG: at 06:15

## 2023-01-22 RX ADMIN — IPRATROPIUM BROMIDE AND ALBUTEROL SULFATE SCH ML: .5; 3 SOLUTION RESPIRATORY (INHALATION) at 21:59

## 2023-01-22 RX ADMIN — SENNOSIDES SCH MG: 8.6 TABLET, FILM COATED ORAL at 19:45

## 2023-01-22 RX ADMIN — SODIUM CHLORIDE SCH MLS/HR: 900 INJECTION INTRAVENOUS at 17:44

## 2023-01-22 RX ADMIN — ENOXAPARIN SODIUM SCH MG: 100 INJECTION SUBCUTANEOUS at 09:57

## 2023-01-22 RX ADMIN — POTASSIUM CHLORIDE SCH MEQ: 1500 TABLET, EXTENDED RELEASE ORAL at 05:50

## 2023-01-22 RX ADMIN — METHYLPREDNISOLONE SODIUM SUCCINATE SCH MG: 40 INJECTION, POWDER, FOR SOLUTION INTRAMUSCULAR; INTRAVENOUS at 19:44

## 2023-01-22 RX ADMIN — POTASSIUM CHLORIDE SCH MLS/HR: 200 INJECTION, SOLUTION INTRAVENOUS at 05:49

## 2023-01-22 RX ADMIN — IPRATROPIUM BROMIDE AND ALBUTEROL SULFATE SCH ML: .5; 3 SOLUTION RESPIRATORY (INHALATION) at 02:39

## 2023-01-22 RX ADMIN — SODIUM CHLORIDE SCH MLS/HR: 900 INJECTION INTRAVENOUS at 08:42

## 2023-01-22 RX ADMIN — IPRATROPIUM BROMIDE AND ALBUTEROL SULFATE SCH ML: .5; 3 SOLUTION RESPIRATORY (INHALATION) at 07:54

## 2023-01-22 RX ADMIN — SODIUM CHLORIDE SCH MLS/HR: 900 INJECTION INTRAVENOUS at 00:58

## 2023-01-22 RX ADMIN — METOPROLOL TARTRATE SCH MG: 1 INJECTION, SOLUTION INTRAVENOUS at 00:58

## 2023-01-22 RX ADMIN — Medication SCH MLS/HR: at 07:00

## 2023-01-22 RX ADMIN — NICOTINE SCH MG: 21 PATCH, EXTENDED RELEASE TRANSDERMAL at 08:43

## 2023-01-22 RX ADMIN — PANTOPRAZOLE SODIUM SCH MG: 40 INJECTION, POWDER, FOR SOLUTION INTRAVENOUS at 08:42

## 2023-01-22 NOTE — PROGRESS NOTE - HOSPITALIST
Subjective


HPI/CC On Admission


Date Seen by Provider:  Jan 22, 2023


Time Seen by Provider:  11:00


Subjective/Events-last exam


Patient doing a lot better


Moving to fourth floor


Oxygen monitor


Very weak


Reviewed meds





Review of Systems


General:  Fatigue, Malaise





Focused Exam


Time of Focused Exam:  16:06





Objective


Exam


Vital Signs





Vital Signs








  Date Time  Temp Pulse Resp B/P (MAP) Pulse Ox O2 Delivery O2 Flow Rate FiO2


 


1/22/23 12:00  92 24 168/85 (112) 93 High Flow N/C 1.00 


 


1/22/23 08:37 37.4       


 


1/21/23 02:35        30





Capillary Refill : Less Than 3 Seconds


General Appearance:  No Apparent Distress, WD/WN, Chronically ill, Thin, Other 

([place])


Respiratory:  No Accessory Muscle Use, No Respiratory Distress, Decreased Breath

Sounds


Cardiovascular:  Regular Rate, Rhythm


Neurologic/Psychiatric:  Alert, Oriented x3, Normal Mood/Affect, CNs II-XII Norm

as Tested, Motor Weakness (Generalized)





Results/Procedures


Lab


Laboratory Tests


1/22/23 04:50








Patient resulted labs reviewed.





Assessment/Plan


Assessment and Plan


Assess & Plan/Chief Complaint








Assess & Plan/Chief Complaint





Acute on chronic respiratory failure 


- likely secondary to pneumonia and/or COPD exacerbation


- ABG 1/19 7.41/42/68


- stable on vapotherm since 1/19 (35.00)


- extubated on 1/19 


- intubated from 1/17-1/19 (500/14/6/30%)





Pneumonia 


- CXR 1/20 - worsening of bilateral effusions with unchanged pulmonary 

consolidations


- CXR 1/19 - worsening infiltrate of the right mid and lower lobe 


- No growth on blood and sputum cultures obtained 1/17-1/18


- Nasal cultures negative for MRSA


- piperacillin tazobactam since 1/20


- Vancomycin since 1/17


- Azithromycin since 1/17





COPD 


- duonebs since 1/17


- methylprednisone since 1/17 





Sepsis 


- above management 





Hypotension


- resolved as of 1/19 


- pressors from 1/17-1/19





Stroke with right hemiparesis (chronic) 





Hyperlipidemia (chronic) 





CAD (chronic)





Plan:


Transfer to fourth floor


Supportive care





Critical Care


Critically Ill Patient











CANDIS WONG DO                Jan 22, 2023 08:05

## 2023-01-22 NOTE — TELE-ICU PROGRESS NOTE
Progress Note


video rounds completed





63 y/o male admitted with COPD exacerbation/PNA


Overall improving


On nasal cannula oxygen


remains on zosyn for PNA





PE: comfortable sitting up in bed


O2 sat: 95%





IMP: COPD excaerbation with PNA








PLAN: continue respiratory therapy with nebs and steroids and antibiotics





Focused Exam


Height, Weight, BMI


Height: '"


Weight: lbs. oz. kg; 24.07 BMI


Method:


Time of Focused Exam:  16:06





Labs


Laboratory Tests


1/22/23 04:50











Labs


Laboratory Tests


1/22/23 04:50











Results


Results/Procedures


Lab


Laboratory Tests


1/21/23 03:38








1/22/23 04:50











Results


Labs


Labs


Laboratory Tests


1/21/23 10:39: Glucometer 119H


1/21/23 16:55: Glucometer 149H


1/22/23 04:50: 


White Blood Count 7.0, Red Blood Count 3.78L, Hemoglobin 12.3L, Hematocrit 36L, 

Mean Corpuscular Volume 96, Mean Corpuscular Hemoglobin 33, Mean Corpuscular 

Hemoglobin Concent 34, Red Cell Distribution Width 12.3, Platelet Count 95L, 

Mean Platelet Volume 10.4, Immature Granulocyte % (Auto) 0, Neutrophils (%) 

(Auto) 85H, Lymphocytes (%) (Auto) 8L, Monocytes (%) (Auto) 7, Eosinophils (%) 

(Auto) 0, Basophils (%) (Auto) 0, Neutrophils # (Auto) 6.0, Lymphocytes # (Auto)

0.6L, Monocytes # (Auto) 0.5, Eosinophils # (Auto) 0.0, Basophils # (Auto) 0.0, 

Immature Granulocyte # (Auto) 0.0, Percent Immature Platelet Fraction 6.2, 

Sodium Level 136, Potassium Level 3.9, Chloride Level 103, Carbon Dioxide Level 

24, Anion Gap 9, Blood Urea Nitrogen 12, Creatinine 0.62, Estimat Glomerular 

Filtration Rate 109, BUN/Creatinine Ratio 19, Glucose Level 126H, Calcium Level 

7.9L, Corrected Calcium 8.8, Phosphorus Level 3.0, Magnesium Level 1.9, Total 

Bilirubin 0.8, Aspartate Amino Transf (AST/SGOT) 23, Alanine Aminotransferase 

(ALT/SGPT) 43, Alkaline Phosphatase 52, Total Protein 5.3L, Albumin 2.9L





Microbiology


1/18/23 MRSA Screen - Final, Complete


          MRSA not isolated


1/17/23 Blood Culture - Preliminary, Resulted


          No growth











HUSEYIN LALA MD            Jan 22, 2023 09:25

## 2023-01-23 VITALS — DIASTOLIC BLOOD PRESSURE: 78 MMHG | SYSTOLIC BLOOD PRESSURE: 171 MMHG

## 2023-01-23 VITALS — SYSTOLIC BLOOD PRESSURE: 155 MMHG | DIASTOLIC BLOOD PRESSURE: 70 MMHG

## 2023-01-23 VITALS — DIASTOLIC BLOOD PRESSURE: 77 MMHG | SYSTOLIC BLOOD PRESSURE: 159 MMHG

## 2023-01-23 VITALS — SYSTOLIC BLOOD PRESSURE: 160 MMHG | DIASTOLIC BLOOD PRESSURE: 70 MMHG

## 2023-01-23 VITALS — SYSTOLIC BLOOD PRESSURE: 146 MMHG | DIASTOLIC BLOOD PRESSURE: 67 MMHG

## 2023-01-23 VITALS — DIASTOLIC BLOOD PRESSURE: 70 MMHG | SYSTOLIC BLOOD PRESSURE: 155 MMHG

## 2023-01-23 VITALS — SYSTOLIC BLOOD PRESSURE: 124 MMHG | DIASTOLIC BLOOD PRESSURE: 58 MMHG

## 2023-01-23 VITALS — SYSTOLIC BLOOD PRESSURE: 171 MMHG | DIASTOLIC BLOOD PRESSURE: 78 MMHG

## 2023-01-23 LAB
ALBUMIN SERPL-MCNC: 2.8 GM/DL (ref 3.2–4.5)
ALP SERPL-CCNC: 48 U/L (ref 40–136)
ALT SERPL-CCNC: 39 U/L (ref 0–55)
BASOPHILS # BLD AUTO: 0 10^3/UL (ref 0–0.1)
BASOPHILS NFR BLD AUTO: 0 % (ref 0–10)
BILIRUB SERPL-MCNC: 0.9 MG/DL (ref 0.1–1)
BUN/CREAT SERPL: 19
CALCIUM SERPL-MCNC: 8 MG/DL (ref 8.5–10.1)
CHLORIDE SERPL-SCNC: 103 MMOL/L (ref 98–107)
CO2 SERPL-SCNC: 24 MMOL/L (ref 21–32)
CREAT SERPL-MCNC: 0.57 MG/DL (ref 0.6–1.3)
EOSINOPHIL # BLD AUTO: 0 10^3/UL (ref 0–0.3)
EOSINOPHIL NFR BLD AUTO: 0 % (ref 0–10)
GFR SERPLBLD BASED ON 1.73 SQ M-ARVRAT: 112 ML/MIN
GLUCOSE SERPL-MCNC: 122 MG/DL (ref 70–105)
HCT VFR BLD CALC: 36 % (ref 40–54)
HGB BLD-MCNC: 12.6 G/DL (ref 13.3–17.7)
LYMPHOCYTES # BLD AUTO: 0.6 10^3/UL (ref 1–4)
LYMPHOCYTES NFR BLD AUTO: 10 % (ref 12–44)
MAGNESIUM SERPL-MCNC: 1.9 MG/DL (ref 1.6–2.4)
MANUAL DIFFERENTIAL PERFORMED BLD QL: NO
MCH RBC QN AUTO: 33 PG (ref 25–34)
MCHC RBC AUTO-ENTMCNC: 35 G/DL (ref 32–36)
MCV RBC AUTO: 95 FL (ref 80–99)
MONOCYTES # BLD AUTO: 0.6 10^3/UL (ref 0–1)
MONOCYTES NFR BLD AUTO: 10 % (ref 0–12)
NEUTROPHILS # BLD AUTO: 5.1 10^3/UL (ref 1.8–7.8)
NEUTROPHILS NFR BLD AUTO: 80 % (ref 42–75)
PLATELET # BLD: 110 10^3/UL (ref 130–400)
PMV BLD AUTO: 10.5 FL (ref 9–12.2)
POTASSIUM SERPL-SCNC: 3.6 MMOL/L (ref 3.6–5)
PROT SERPL-MCNC: 5.2 GM/DL (ref 6.4–8.2)
SODIUM SERPL-SCNC: 136 MMOL/L (ref 135–145)
WBC # BLD AUTO: 6.4 10^3/UL (ref 4.3–11)

## 2023-01-23 RX ADMIN — SODIUM CHLORIDE SCH MLS/HR: 900 INJECTION INTRAVENOUS at 23:41

## 2023-01-23 RX ADMIN — SERTRALINE HYDROCHLORIDE SCH MG: 50 TABLET, FILM COATED ORAL at 09:24

## 2023-01-23 RX ADMIN — CLONIDINE HYDROCHLORIDE PRN MG: 0.1 TABLET ORAL at 00:50

## 2023-01-23 RX ADMIN — FOLIC ACID SCH MG: 1 TABLET ORAL at 09:27

## 2023-01-23 RX ADMIN — IPRATROPIUM BROMIDE AND ALBUTEROL SULFATE SCH ML: .5; 3 SOLUTION RESPIRATORY (INHALATION) at 07:59

## 2023-01-23 RX ADMIN — METOPROLOL SUCCINATE SCH MG: 50 TABLET, EXTENDED RELEASE ORAL at 09:24

## 2023-01-23 RX ADMIN — LORAZEPAM PRN MG: 1 TABLET ORAL at 01:13

## 2023-01-23 RX ADMIN — SENNOSIDES SCH MG: 8.6 TABLET, FILM COATED ORAL at 20:22

## 2023-01-23 RX ADMIN — ASPIRIN SCH MG: 81 TABLET ORAL at 09:23

## 2023-01-23 RX ADMIN — ALBUTEROL SULFATE SCH MG: 2.5 SOLUTION RESPIRATORY (INHALATION) at 21:29

## 2023-01-23 RX ADMIN — CLOPIDOGREL BISULFATE SCH MG: 75 TABLET, FILM COATED ORAL at 09:24

## 2023-01-23 RX ADMIN — IPRATROPIUM BROMIDE SCH MG: 0.5 SOLUTION RESPIRATORY (INHALATION) at 21:29

## 2023-01-23 RX ADMIN — DOCUSATE SODIUM SCH MG: 100 CAPSULE ORAL at 20:22

## 2023-01-23 RX ADMIN — DOCUSATE SODIUM SCH MG: 100 CAPSULE ORAL at 09:23

## 2023-01-23 RX ADMIN — METHYLPREDNISOLONE SODIUM SUCCINATE SCH MG: 40 INJECTION, POWDER, FOR SOLUTION INTRAMUSCULAR; INTRAVENOUS at 20:12

## 2023-01-23 RX ADMIN — SODIUM CHLORIDE SCH MLS/HR: 900 INJECTION INTRAVENOUS at 00:41

## 2023-01-23 RX ADMIN — SODIUM CHLORIDE SCH MLS/HR: 900 INJECTION INTRAVENOUS at 17:22

## 2023-01-23 RX ADMIN — IPRATROPIUM BROMIDE AND ALBUTEROL SULFATE SCH ML: .5; 3 SOLUTION RESPIRATORY (INHALATION) at 03:09

## 2023-01-23 RX ADMIN — LORAZEPAM PRN MG: 1 TABLET ORAL at 20:18

## 2023-01-23 RX ADMIN — METHYLPREDNISOLONE SODIUM SUCCINATE SCH MG: 40 INJECTION, POWDER, FOR SOLUTION INTRAMUSCULAR; INTRAVENOUS at 09:24

## 2023-01-23 RX ADMIN — SODIUM CHLORIDE SCH MLS/HR: 900 INJECTION INTRAVENOUS at 09:24

## 2023-01-23 RX ADMIN — PANTOPRAZOLE SODIUM SCH MG: 40 TABLET, DELAYED RELEASE ORAL at 09:24

## 2023-01-23 RX ADMIN — ENOXAPARIN SODIUM SCH MG: 100 INJECTION SUBCUTANEOUS at 09:23

## 2023-01-23 RX ADMIN — NICOTINE SCH MG: 21 PATCH, EXTENDED RELEASE TRANSDERMAL at 09:23

## 2023-01-23 RX ADMIN — SENNOSIDES SCH MG: 8.6 TABLET, FILM COATED ORAL at 11:08

## 2023-01-23 RX ADMIN — AMLODIPINE BESYLATE SCH MG: 5 TABLET ORAL at 09:24

## 2023-01-23 RX ADMIN — Medication SCH MG: at 05:57

## 2023-01-23 NOTE — SPEECH THERAPY DAILY NOTE
Speech Daily Progress Note


Subjective


Date Seen by Provider:  Jan 23, 2023


Time Seen by Provider:  08:50


The patient was seated upright in bed, awake and alert, upon entrance to his 

room by the clinician. The patient greeted the clinician appropriately and was 

agreeable to participation in the dysphagia treatment session. The patient is 

currently on room air and denied shortness of breath at this time.





Objective


The patient consumed multiple straw drinks of coffee and multiple Cheetos (which

were present at bedside). Per patient, "I have to let them dissolve first." No 

s/s of suspected aspiration were present with the item consumed by the patient. 

Safe swallowing precautions were reviewed with the patient prior to the close of

the session.





The patient's diet consistency was placed on the white board as "SB6 with thin 

liquids." The patient's white board previously read, "Regular with Pureed." The 

patient is currently receiving pureed items on his tray which is not consistent 

with the clinician's recommendations. The clinician will follow up with the 

kitchen/dietary for a solution.





Continue plan of care.





Assessment


Assessment Current Status:  Good Progress





Treatment Plan


Continue Plan of Care





Speech Short Term Goals


Short Term Goals


Short Term Goals


1. The patient will display safe swallowing strategies with 80% accuracy, indep

endently.


Time Frame-STG:  Five Days.





Speech Long Term Goals


Long Term Goals


1. The patient will tolerate the least restrictive diet consistency without s/s 

of suspected aspiration.


Time Frame:  One Week.





Speech-Plan


Treatment Plan


Speech Therapy Treatment Plan:  Continue Plan of Care


Treatment Duration:  Jan 27, 2023


Frequency:  4 times per week


Estimated Hrs Per Day:  .25 hour per day


Rehab Potential:  Guarded





Safety Risks/Education


Teaching Recipient:  Patient


Teaching Methods:  Demonstration, Discussion


Response to Teaching:  Verbalize Understanding, Return Demonstration, 

Reinforcement Needed


Education Topics Provided:  


Safe Swallowing Precautions





Time


Speech Therapy Time In:  08:50


Speech Therapy Time Out:  09:01


DATE:  Jan 23, 2023


Total Billed Time:  11


Billed Treatment Time


[address]               Jan 23, 2023 09:10

## 2023-01-23 NOTE — PHYSICAL THERAPY DAILY NOTE
PT Daily Note-Current


Subjective


Pt found laying in bed /c family present upon entry. Agreed to PT. States that 

he is not having any pain. Reports Hx of stroke, multiple stints, and a heart 

attack. States that he does not ever want to live in a nursing home.





Pain


Section J - Health Conditions


1. Rarely or not at all


2. Occasionally


3. Frequently


4. Almost constantly


8. Unable to answer


Pain Effect on Sleep:  2


Pain Interference with Therapy:  2


Pain Interference w/Day-to-Day:  2





Mental Status


Patient Orientation:  Normal For Age


Attachments:  IV





Transfers


SCALE: Activities may be completed with or without assistive devices.





6-Indepedent-patient completes the activity by him/herself with no assistance 

from a helper.


5-Set-up or Clean-up Assistance-helper sets up or cleans up; patient completes 

activity. [place] assists only prior to or  


    following the activity.


4-Supervision or Touching Assistance-helper provides verbal cues and/or 

touching/steadying and/or contact guard assistance as patient completes 

activity. Assistance may be provided   


    throughout the activity or intermittently.


3-Partial/Moderate Assistance-helper does LESS THAN HALF the effort. [place] 

lifts, holds or supports trunk or limbs, but provides less than half the effort.


2-Substantial/Maximal Assistance-helper does MORE THAN HALF the effort. [place] 

lifts or holds trunk or limbs and provides more than half the effort.


1-Qnugffrbk-qmohcn does ALL the effort. Patient does none of the effort to 

complete the activity. Or, the assistance of 2 or more helpers is required for 

the patient to complete the  


    activity.


If activity was not attempted, code reason:


7-Patient Refused.


9-Not Applicable-not attempted and the patient did not perform the activity 

before the current illness, exacerbation or injury.


10-Not Attempted due to Environmental Limitations-(lack of equipment, weather 

restraints, etc.).


88-Not Attempted due to Medical Conditions or Safety Concerns.


Sit to Lying (QC):  5


Lying to Sitting/Side of Bed(Q:  5


Sit to Stand (QC):  5


Pt set-up assist /c all trfs.





Gait Training


Does the Patient Walk?:  No and Walking Goal IS indicated


Gait Assistive Device:  FWW





Wheelchair Training


Does the Pt Use a Wheelchair?:  No





Exercises


Supine Ex:  Straight leg raise


Supine Reps:  10


Seated Therapy Exercises:  Ankle pumps, Sit to stand, Long arc quads, Hip 

flexion, Hip abd/add


Seated Reps:  10


Pt displays good endurance /c exercise.





Assessment


Current Status:  Good Progress


Pt able to stand for 1min before requiring a RB. Demonstrated good endurance and

strength /c exercise. Continue to progress pt as tolerated per POC to increase 

strength, endurance, and functional ability.





PT Long Term Goals


Long Term Goals


PT Long Term Goals Time Frame:  Jan 27, 2023


Roll Left & Right (QC):  6


Sit to Lying (QC):  6


Lying-Sitting on Side/Bed(QC):  6


Sit to Stand (QC):  4


Chair/Bed-to-Chair Xfer(QC):  4


Walk 10 feet (QC):  4





PT Plan


Treatment/Plan


Treatment Plan:  Continue Plan of Care


Treatment Plan:  Bed Mobility, Education, Functional Activity Yanni, Functional 

Strength, Gait, Safety, Therapeutic Exercise, Transfers


Treatment Duration:  Jan 27, 2023


Frequency:  6 times per week


Estimated Hrs Per Day:  .25 hour per day


Patient and/or Family Agrees t:  Yes





Time


Time In:  1139


Time Out:  1205


DATE:  Jan 23, 2023


Total Billed Treatment Time:  26


Total Billed Treatment


1 visit


FA 1x


EX 1x











JOYCELYN SHERMAN PTA                Jan 23, 2023 14:56

## 2023-01-23 NOTE — PROGRESS NOTE - HOSPITALIST
LADARIUS CHRISTINA 1/23/23 1304:


Subjective


HPI/CC On Admission


Date Seen by Provider:  Jan 23, 2023


Subjective/Events-last exam


61 yo M with a history of COPD, CHF, HTN, CAD and previous stroke who presented 

to the ER on 1/17 for worsening SOB that began that day. He was found to be in 

acute hypoxic respiratory failure and placed on CPAP. He was transported to [facility] by EMS who noted a oxygen saturation of 82% on 100% CPAP as well as 

hypotension requiring. Due to continued repsiratory decline, he was intubated 

shortly after admission with vent settings at 500/14/6/30%. Pressors were 

continued to due persistent hypotension. CXR obtained this day demonstrated COPD

and a patchy infiltrate and the right lung field; vancomycin, azithromycin, 

duonebs, and methylprednisone were started. At this time, he is stable on 3.00L 

of supplemental oxygen by nasal canula. He continues to deny chest pain, SOB or 

pain otherwise.





Focused Exam


Time of Focused Exam:  16:06





Objective


Exam


Vital Signs





Vital Signs








  Date Time  Temp Pulse Resp B/P (MAP) Pulse Ox O2 Delivery O2 Flow Rate FiO2


 


1/23/23 12:00 36.8 87 16 155/70 (98) 94 Nasal Cannula 1.00 


 


1/21/23 02:35        30





Capillary Refill : Less Than 3 Seconds


General Appearance:  No Apparent Distress


Respiratory:  No Accessory Muscle Use, No Respiratory Distress, Other (mildly 

diminished breath sounds)


Cardiovascular:  Regular Rate, Rhythm


Gastrointestinal:  Normal Bowel Sounds


Neurologic/Psychiatric:  Alert, Oriented x3





Results/Procedures


Lab


Laboratory Tests


1/23/23 04:28








Patient resulted labs reviewed.





Assessment/Plan


Assessment and Plan


Assess & Plan/Chief Complaint





Acute on chronic respiratory failure 


- likely secondary to pneumonia and/or COPD exacerbation


- nasal canula since 1/21


- vapotherm 1/19 - 1/20


- intubated from 1/17-1/19 (500/14/6/30%)





Pneumonia 


- CXR 1/20 - worsening of bilateral effusions with unchanged pulmonary 

consolidations


- CXR 1/19 - worsening infiltrate of the right mid and lower lobe 


- No growth on blood and sputum cultures obtained 1/17-1/18


- Nasal cultures negative for MRSA


- piperacillin tazobactam since 1/20


- Vancomycin since 1/17


- Azithromycin since 1/17





COPD 


- duonebs since 1/17


- methylprednisone since 1/17 





Sepsis 


- above management 





Hypertension (since 1/19)


- amlodipine


- metoprolol 





Hypotension (resolved) 


- pressors from 1/17-1/19





CHF (chronic) 


- Furosemide 40 mg on 1/19





Stroke with right hemiparesis (chronic) 





Hyperlipidemia (chronic) 





CAD (chronic) 








Plans: Remove catheter today. Patient may require nursing home care.





TRACI WONG DO 1/24/23 0505:


Supervisory-Addendum Brief


Verification & Attestation


Participated in pt care:  history, MDM, physical


Personally performed:  exam, history, MDM, supervision of care


Care discussed with:  Medical Student


Procedures:  n/a


Results interpretation:  Verified all documentation


Verification and Attestation of Medical Student E/M Service





A medical student performed and documented this service in my presence. I 

reviewed and verified all information documented by the medical student and made

modifications to such information, when appropriate. I personally performed the 

physical exam and medical decision making. 





 Traci Wong, Jan 24, 2023,05:04











LADARIUS CHRISTINA                     Jan 23, 2023 13:04


TRACI WONG DO                Jan 24, 2023 05:05

## 2023-01-23 NOTE — OCCUPATIONAL THER DAILY NOTE
OT Current Status-Daily Note


Subjective


Pt alert, lying in bed.  Nrsg reported that pt was assisted with bathing prior 

to OT session.  Pt agrees to therapy.  No c/o pain.





Mental Status/Objective


Patient Orientation:  Person, Place, Time, Situation





ADL-Treatment


Pt agrees to complete oral care.  After set up, pt completed oral care with 

sponge due to no dentures/teeth.  Pt stated that he used to have a resting hand 

splint but was hard to place on R hand so he does not wear this anymore.  Pt has

decreased ROM of R elbow/wrist.  R shldr has movement though is limited on shldr

flexion.  After therapy, pt lying in bed with call light/phone in reach.  All 

needs met in room.


Therapy Code Descriptions/Definitions 





Functional [place] Measure:


0=Not Assessed/NA        4=Minimal Assistance


1=Total Assistance        5=Supervision or Setup


2=Maximal Assistance  6=Modified [place]


3=Moderate Assistance 7=Complete IndependenceSCALE: Activities may be completed 

with or without assistive devices.





6-Indepedent-patient completes the activity by him/herself with no assistance 

from a helper.


5-Set-up or Clean-up Assistance-helper sets up or cleans up; patient completes 

activity. [place] assists only prior to or  


    following the activity.


4-Supervision or Touching Assistance-helper provides verbal cues and/or 

touching/steadying and/or contact guard assistance as patient completes 

activity. Assistance may be provided   


    throughout the activity or intermittently.


3-Partial/Moderate Assistance-helper does LESS THAN HALF the effort. [place] 

lifts, holds or supports trunk or limbs, but provides less than half the effort.


2-Substantial/Maximal Assistance-helper does MORE THAN HALF the effort. [place] 

lifts or holds trunk or limbs and provides more than half the effort.


3-Ovpzjkabh-ewdedb does ALL the effort. Patient does none of the effort to 

complete the activity. Or, the assistance of 2 or more helpers is required for 

the patient to complete the  


    activity.


If activity was not attempted, code reason:


7-Patient Refused.


9-Not Applicable-not attempted and the patient did not perform the activity 

before the current illness, exacerbation or injury.


10-Not Attempted due to Environmental Limitations-(lack of equipment, weather 

restraints, etc.).


88-Not Attempted due to Medical Conditions or Safety Concerns.


Oral Hygiene (QC):  5





OT Long Term Goals


Long Term Goals


Time Frame:  Feb 3, 2023


Eating (QC):  5


Oral Hygiene (QC):  5


Toileting Hygiene (QC):  4


Shower/Bathe Self (QC):  3


Upper Body Dressing (QC):  3


Lower Body Dressing (QC):  3


Additional Goals:  1-Demonstrate ADL Tasks, 2-Verbalize Understanding, 3-

ImproveStrength/Yanni


1=Demonstrate adherence to instructed precautions during ADL tasks.


2=Patient will verbalize/demonstrate understanding of assistive 

devices/modifications for ADL.


3=Patient will improve strength/tolerance for activity to enable patient to 

perform ADL's.





OT Education/Plan


Problem List/Assessment


Assessment:  Decreased Activ Tolerance, Impaired Self-Care Skills, Restricted 

Funct UE ROM





Discharge Recommendations


Plan/Recommendations:  Continue POC





Treatment Plan/Plan of Care


Patient would benefit from OT for education, treatment and training to promote 

independence in ADL's, mobility, safety and/or upper extremity function for 

ADL's.


Plan of Care:  ADL Retraining, Functional Mobility, UE Funct Exercise/Act


Treatment Duration:  Feb 3, 2023


Frequency:  3 times per week (3-5 times per week)


Estimated Hrs Per Day:  .25 hour per day


Rehab Potential:  Guarded





Time


Start Time:  10:30


Stop Time:  10:43


DATE:  Jan 23, 2023


Total Time Billed (hr/min):  13


Billed Treatment Time


1 visit-ADL 1 (13 min)











FRANSISCO MURRAY               Jan 23, 2023 12:49

## 2023-01-24 VITALS — SYSTOLIC BLOOD PRESSURE: 135 MMHG | DIASTOLIC BLOOD PRESSURE: 69 MMHG

## 2023-01-24 VITALS — SYSTOLIC BLOOD PRESSURE: 175 MMHG | DIASTOLIC BLOOD PRESSURE: 61 MMHG

## 2023-01-24 VITALS — DIASTOLIC BLOOD PRESSURE: 57 MMHG | SYSTOLIC BLOOD PRESSURE: 96 MMHG

## 2023-01-24 VITALS — SYSTOLIC BLOOD PRESSURE: 142 MMHG | DIASTOLIC BLOOD PRESSURE: 76 MMHG

## 2023-01-24 VITALS — SYSTOLIC BLOOD PRESSURE: 152 MMHG | DIASTOLIC BLOOD PRESSURE: 74 MMHG

## 2023-01-24 VITALS — SYSTOLIC BLOOD PRESSURE: 166 MMHG | DIASTOLIC BLOOD PRESSURE: 69 MMHG

## 2023-01-24 VITALS — DIASTOLIC BLOOD PRESSURE: 62 MMHG | SYSTOLIC BLOOD PRESSURE: 108 MMHG

## 2023-01-24 LAB
ALBUMIN SERPL-MCNC: 2.9 GM/DL (ref 3.2–4.5)
ALP SERPL-CCNC: 49 U/L (ref 40–136)
ALT SERPL-CCNC: 45 U/L (ref 0–55)
BASOPHILS # BLD AUTO: 0 10^3/UL (ref 0–0.1)
BASOPHILS NFR BLD AUTO: 0 % (ref 0–10)
BILIRUB SERPL-MCNC: 0.7 MG/DL (ref 0.1–1)
BUN/CREAT SERPL: 20
CALCIUM SERPL-MCNC: 8.2 MG/DL (ref 8.5–10.1)
CHLORIDE SERPL-SCNC: 104 MMOL/L (ref 98–107)
CO2 SERPL-SCNC: 23 MMOL/L (ref 21–32)
CREAT SERPL-MCNC: 0.59 MG/DL (ref 0.6–1.3)
EOSINOPHIL # BLD AUTO: 0 10^3/UL (ref 0–0.3)
EOSINOPHIL NFR BLD AUTO: 0 % (ref 0–10)
GFR SERPLBLD BASED ON 1.73 SQ M-ARVRAT: 110 ML/MIN
GLUCOSE SERPL-MCNC: 118 MG/DL (ref 70–105)
HCT VFR BLD CALC: 38 % (ref 40–54)
HGB BLD-MCNC: 13.3 G/DL (ref 13.3–17.7)
LYMPHOCYTES # BLD AUTO: 0.8 10^3/UL (ref 1–4)
LYMPHOCYTES NFR BLD AUTO: 9 % (ref 12–44)
MAGNESIUM SERPL-MCNC: 1.9 MG/DL (ref 1.6–2.4)
MANUAL DIFFERENTIAL PERFORMED BLD QL: NO
MCH RBC QN AUTO: 33 PG (ref 25–34)
MCHC RBC AUTO-ENTMCNC: 35 G/DL (ref 32–36)
MCV RBC AUTO: 94 FL (ref 80–99)
MONOCYTES # BLD AUTO: 0.8 10^3/UL (ref 0–1)
MONOCYTES NFR BLD AUTO: 9 % (ref 0–12)
NEUTROPHILS # BLD AUTO: 7.1 10^3/UL (ref 1.8–7.8)
NEUTROPHILS NFR BLD AUTO: 82 % (ref 42–75)
PLATELET # BLD: 143 10^3/UL (ref 130–400)
PMV BLD AUTO: 10.2 FL (ref 9–12.2)
POTASSIUM SERPL-SCNC: 3.6 MMOL/L (ref 3.6–5)
PROT SERPL-MCNC: 5.4 GM/DL (ref 6.4–8.2)
SODIUM SERPL-SCNC: 136 MMOL/L (ref 135–145)
WBC # BLD AUTO: 8.7 10^3/UL (ref 4.3–11)

## 2023-01-24 RX ADMIN — SODIUM CHLORIDE SCH MLS/HR: 900 INJECTION INTRAVENOUS at 07:55

## 2023-01-24 RX ADMIN — SODIUM CHLORIDE SCH MLS/HR: 900 INJECTION INTRAVENOUS at 16:39

## 2023-01-24 RX ADMIN — ALBUTEROL SULFATE SCH MG: 2.5 SOLUTION RESPIRATORY (INHALATION) at 08:04

## 2023-01-24 RX ADMIN — IPRATROPIUM BROMIDE SCH MG: 0.5 SOLUTION RESPIRATORY (INHALATION) at 19:08

## 2023-01-24 RX ADMIN — SENNOSIDES SCH MG: 8.6 TABLET, FILM COATED ORAL at 20:05

## 2023-01-24 RX ADMIN — LORAZEPAM PRN MG: 1 TABLET ORAL at 20:04

## 2023-01-24 RX ADMIN — FOLIC ACID SCH MG: 1 TABLET ORAL at 08:23

## 2023-01-24 RX ADMIN — Medication SCH MG: at 05:29

## 2023-01-24 RX ADMIN — ENOXAPARIN SODIUM SCH MG: 100 INJECTION SUBCUTANEOUS at 10:16

## 2023-01-24 RX ADMIN — ALBUTEROL SULFATE SCH MG: 2.5 SOLUTION RESPIRATORY (INHALATION) at 19:08

## 2023-01-24 RX ADMIN — AMLODIPINE BESYLATE SCH MG: 5 TABLET ORAL at 08:23

## 2023-01-24 RX ADMIN — DOCUSATE SODIUM SCH MG: 100 CAPSULE ORAL at 08:23

## 2023-01-24 RX ADMIN — NICOTINE SCH MG: 21 PATCH, EXTENDED RELEASE TRANSDERMAL at 08:23

## 2023-01-24 RX ADMIN — METHYLPREDNISOLONE SODIUM SUCCINATE SCH MG: 40 INJECTION, POWDER, FOR SOLUTION INTRAMUSCULAR; INTRAVENOUS at 20:04

## 2023-01-24 RX ADMIN — METOPROLOL SUCCINATE SCH MG: 50 TABLET, EXTENDED RELEASE ORAL at 08:23

## 2023-01-24 RX ADMIN — METHYLPREDNISOLONE SODIUM SUCCINATE SCH MG: 40 INJECTION, POWDER, FOR SOLUTION INTRAMUSCULAR; INTRAVENOUS at 08:23

## 2023-01-24 RX ADMIN — PANTOPRAZOLE SODIUM SCH MG: 40 TABLET, DELAYED RELEASE ORAL at 08:23

## 2023-01-24 RX ADMIN — ASPIRIN SCH MG: 81 TABLET ORAL at 08:23

## 2023-01-24 RX ADMIN — CLOPIDOGREL BISULFATE SCH MG: 75 TABLET, FILM COATED ORAL at 08:23

## 2023-01-24 RX ADMIN — IPRATROPIUM BROMIDE SCH MG: 0.5 SOLUTION RESPIRATORY (INHALATION) at 08:05

## 2023-01-24 RX ADMIN — SENNOSIDES SCH MG: 8.6 TABLET, FILM COATED ORAL at 08:23

## 2023-01-24 RX ADMIN — SERTRALINE HYDROCHLORIDE SCH MG: 50 TABLET, FILM COATED ORAL at 08:23

## 2023-01-24 RX ADMIN — DOCUSATE SODIUM SCH MG: 100 CAPSULE ORAL at 20:05

## 2023-01-24 RX ADMIN — CLONIDINE HYDROCHLORIDE PRN MG: 0.1 TABLET ORAL at 08:25

## 2023-01-24 RX ADMIN — CLONIDINE HYDROCHLORIDE PRN MG: 0.1 TABLET ORAL at 04:09

## 2023-01-24 NOTE — OCCUPATIONAL THER DAILY NOTE
OT Current Status-Daily Note


Subjective


Up in bed watching tv.





Mental Status/Objective


Patient Orientation:  Person, Place, Time, Situation


Attachments:  IV


RT entered room during session and will return





ADL-Treatment


NO garments available, OT facilitated UE and LE garment assessment with slipper 

socks, SCDs, and hospital gown. OT set up wash clothes for face and UB sponge 

hygiene EOB. Patient reports spouse assists with bathing and UB dressing not 

initially reported by patient


Therapy Code Descriptions/Definitions 





Functional [place] Measure:


0=Not Assessed/NA        4=Minimal Assistance


1=Total Assistance        5=Supervision or Setup


2=Maximal Assistance  6=Modified [place]


3=Moderate Assistance 7=Complete IndependenceSCALE: Activities may be completed 

with or without assistive devices.





6-Indepedent-patient completes the activity by him/herself with no assistance 

from a helper.


5-Set-up or Clean-up Assistance-helper sets up or cleans up; patient completes 

activity. [place] assists only prior to or  


    following the activity.


4-Supervision or Touching Assistance-helper provides verbal cues and/or 

touching/steadying and/or contact guard assistance as patient completes 

activity. Assistance may be provided   


    throughout the activity or intermittently.


3-Partial/Moderate Assistance-helper does LESS THAN HALF the effort. [place] 

lifts, holds or supports trunk or limbs, but provides less than half the effort.


2-Substantial/Maximal Assistance-helper does MORE THAN HALF the effort. [place] 

lifts or holds trunk or limbs and provides more than half the effort.


7-Fmuksklrx-cypnoj does ALL the effort. Patient does none of the effort to 

complete the activity. Or, the assistance of 2 or more helpers is required for 

the patient to complete the  


    activity.


If activity was not attempted, code reason:


7-Patient Refused.


9-Not Applicable-not attempted and the patient did not perform the activity 

before the current illness, exacerbation or injury.


10-Not Attempted due to Environmental Limitations-(lack of equipment, weather 

restraints, etc.).


88-Not Attempted due to Medical Conditions or Safety Concerns.


Eating (QC):  6 (L hand utensil use)


Oral Hygiene (QC):  5 (Set up provided by OT)


Bathing Location:  R Arm, Chest


Shower/Bathe Self (QC):  3


Upper Body Dressing (QC):  4


Lower Body Dressing (QC):  4


On/Off Footwear:  4


Toileting Hygiene (QC):  4


Toilet Transfer (QC):  4 (Required rocking to stand from low surface, patient 

reports he uses "Potty Chair" at home)





Other Treatment


[place]. hand through shoulder





Education


OT Patient Education:  Correct positioning, Energy conservation, Exercise 

program, Home exercise program, Modified ADL techniques, Progress toward 

Goal/Update tx plan, Purpose of tx/functional activities, Reviewed precautions, 

Rehab process, Safety issues, Transfer techniques


Teaching Recipient:  Patient


Teaching Methods:  Demonstration, Discussion


Response to Teaching:  Verbalize Understanding, Return Demonstration





OT Long Term Goals


Long Term Goals


Time Frame:  Feb 3, 2023


Eating (QC):  5


Oral Hygiene (QC):  5


Toileting Hygiene (QC):  4


Shower/Bathe Self (QC):  3


Upper Body Dressing (QC):  3


Lower Body Dressing (QC):  3


Additional Goals:  1-Demonstrate ADL Tasks, 2-Verbalize Understanding, 

3-ImproveStrength/Yanni


1=Demonstrate adherence to instructed precautions during ADL tasks.


2=Patient will verbalize/demonstrate understanding of assistive 

devices/modifications for ADL.


3=Patient will improve strength/tolerance for activity to enable patient to perf

orm ADL's.





OT Education/Plan


Problem List/Assessment


Assessment:  Decreased Activ Tolerance, Decreased UE Strength, Impaired Bed 

Mobility, Impaired Coordination, Impaired Funct Balance, Impaired I ADL's, 

Impaired Self-Care Skills, Restricted Funct UE ROM





Discharge Recommendations


Plan/Recommendations:  Continue POC


Therapy Discharge Recommendati:  Post Acute OT


Equpiment Recommendations-D/C:  Other, See Comments (Right RHS, patient reports 

his current RHS is too difficult to apply. Instruction in HEP of hand ROM pr

ovided)





Treatment Plan/Plan of Care


Patient would benefit from OT for education, treatment and training to promote 

independence in ADL's, mobility, safety and/or upper extremity function for 

ADL's.


Plan of Care:  ADL Retraining, Functional Mobility, UE Funct Exercise/Act


Treatment Duration:  Feb 3, 2023


Frequency:  3 times per week (3-5 times per week)


Estimated Hrs Per Day:  .25 hour per day


Rehab Potential:  Guarded





Time


Start Time:  07:50


Stop Time:  08:18


DATE:  Jan 24, 2023


Total Time Billed (hr/min):  28


Billed Treatment Time


2 ADL 28m











DRINNEN,MICHELLE OT            Jan 24, 2023 08:15

## 2023-01-24 NOTE — PROGRESS NOTE - HOSPITALIST
LADARIUS CHRISTINA 1/24/23 1134:


Subjective


HPI/CC On Admission


Date Seen by Provider:  Jan 24, 2023


Subjective/Events-last exam


Today:


Stable on 1L of supplemental oxygen by nasal canula. He continues to appear 

stronger. He states PT yesterday was a tolerable level of exertion and without 

pain. He endorses a BM this morning. He denies chest pain, SOB or pain 

otherwise. 





History:


63 yo M with a history of COPD, CHF, HTN, CAD and previous stroke who presented 

to the ER on 1/17 for worsening SOB that began that day. He was found to be in 

acute hypoxic respiratory failure and placed on CPAP. He was transported to [facility] by EMS who noted a oxygen saturation of 82% on 100% CPAP as well as 

hypotension requiring. Due to continued repsiratory decline, he was intubated 

shortly after admission with vent settings at 500/14/6/30%. Pressors were 

continued to due persistent hypotension. CXR obtained this day demonstrated COPD

and a patchy infiltrate and the right lung field; vancomycin, azithromycin, 

duonebs, and methylprednisone were started.





Focused Exam


Time of Focused Exam:  16:06





Objective


Exam


Vital Signs





Vital Signs








  Date Time  Temp Pulse Resp B/P (MAP) Pulse Ox O2 Delivery O2 Flow Rate FiO2


 


1/24/23 09:01     97 Room Air 1.00 


 


1/24/23 08:00 37.4 71 18 175/61 (99)    


 


1/21/23 02:35        30





Capillary Refill : Less Than 3 Seconds


General Appearance:  No Apparent Distress


Respiratory:  Lungs Clear


Cardiovascular:  Regular Rate, Rhythm


Gastrointestinal:  Normal Bowel Sounds


Extremity:  Normal Inspection


Neurologic/Psychiatric:  Alert





Results/Procedures


Lab


Laboratory Tests


1/24/23 04:10








Patient resulted labs reviewed.





Assessment/Plan


Assessment and Plan


Assess & Plan/Chief Complaint





Acute on chronic respiratory failure 


- likely secondary to pneumonia and/or COPD exacerbation


- nasal canula since 1/21


- vapotherm 1/19 - 1/20


- intubated from 1/17-1/19 (500/14/6/30%)





Pneumonia 


- CXR 1/20 - worsening of bilateral effusions with unchanged pulmonary 

consolidations


- CXR 1/19 - worsening infiltrate of the right mid and lower lobe


- Sputum cultures obtained 1/18 showed usual upper respiratory km 


- Piperacillin tazobactam since 1/20


- Vancomycin 1/17 -1/19


- Azithromycin 1/17 - 1/19





COPD 


- duonebs since 1/17


- methylprednisone since 1/17 





Sepsis 


- above management 





Hypertension (since 1/19)


- amlodipine


- metoprolol 





Hypotension (resolved) 


- pressors from 1/17-1/19





CHF (chronic) 


- Furosemide 40 mg on 1/19





Stroke with right hemiparesis (chronic) 





Hyperlipidemia (chronic) 





CAD (chronic) 








Plans: Discharge home tomorrow with [facility].





TRACI WONG DO 1/25/23 0607:


Assessment/Plan


Assessment and Plan


Assess & Plan/Chief Complaint


[facility] tomorrow





Supervisory-Addendum Brief


Verification & Attestation


Participated in pt care:  history, MDM, physical


Personally performed:  exam, history, MDM, supervision of care


Care discussed with:  Medical Student


Procedures:  n/a


Results interpretation:  Verified all documentation


Verification and Attestation of Medical Student E/M Service





A medical student performed and documented this service in my presence. I 

reviewed and verified all information documented by the medical student and made

modifications to such information, when appropriate. I personally performed the 

physical exam and medical decision making. 





 Traci Wong, Jan 25, 2023,06:07











LADARIUS CHRISTINA                     Jan 24, 2023 11:34


TRACI WONG DO                Jan 25, 2023 06:07

## 2023-01-24 NOTE — SPEECH THERAPY DAILY NOTE
Speech Daily Progress Note


Subjective


Date Seen by Provider:  Jan 24, 2023


Time Seen by Provider:  10:45


The patient was seated upright, awake and alert, upon entrance to his room by 

the clinician. The patient greeted the clinician appropriately and was agreeable

to participation in the dysphagia treatment session.





Objective


The patient denied concerns regarding his oropharyngeal swallowing function or 

the presence of s/s of suspected aspiration with P.O. intake. The patient is 

currently on room air and denied shortness of breath throughout the treatment 

session. The clinician discussed safe swallowing precautions and the patient 

consumed multiple drinks of coffee via straw. Overt s/s of suspected aspiration 

were not displayed with the thin liquid and the patient's vocal quality remained

consistency clear. As solids have been attempted in the past with prolonged time

and increased moisture necessary for bolus formation and posterior transfer, the

patient is on the highest level of diet consistency recommended.





Recommendations:


- Continue plan of care.





Assessment


Assessment Current Status:  Good Progress





Treatment Plan


Continue Plan of Care





Speech Short Term Goals


Short Term Goals


Short Term Goals


1. The patient will display safe swallowing strategies with 80% accuracy, 

independently.


Time Frame-STG:  Five Days.





Speech Long Term Goals


Long Term Goals


1. The patient will tolerate the least restrictive diet consistency without s/s 

of suspected aspiration.


Time Frame:  One Week.





Speech-Plan


Treatment Plan


Speech Therapy Treatment Plan:  Continue Plan of Care


Treatment Duration:  Jan 27, 2023


Frequency:  4 times per week


Estimated Hrs Per Day:  .25 hour per day


Rehab Potential:  Guarded





Safety Risks/Education


Teaching Recipient:  Patient


Teaching Methods:  Discussion


Response to Teaching:  Return Demonstration


Education Topics Provided:  


Safe Swallowing Precautions





Time


Speech Therapy Time In:  10:45


Speech Therapy Time Out:  10:52


DATE:  Jan 24, 2023


Total Billed Time:  12


Billed Treatment Time


RUTH Diego











KAREN QUILES               Jan 24, 2023 12:04

## 2023-01-24 NOTE — PHYSICAL THERAPY DAILY NOTE
PT Daily Note-Current


Subjective


Patient agrees to PT.





Pain


Section J - Health Conditions


1. Rarely or not at all


2. Occasionally


3. Frequently


4. Almost constantly


8. Unable to answer


Pain Effect on Sleep:  2


Pain Interference with Therapy:  2


Pain Interference w/Day-to-Day:  2





Mental Status


Patient Orientation:  Person, Time, Situation


Attachments:  IV





Transfers


SCALE: Activities may be completed with or without assistive devices.





6-Indepedent-patient completes the activity by him/herself with no assistance 

from a helper.


5-Set-up or Clean-up Assistance-helper sets up or cleans up; patient completes 

activity. [place] assists only prior to or  


    following the activity.


4-Supervision or Touching Assistance-helper provides verbal cues and/or 

touching/steadying and/or contact guard assistance as patient completes 

activity. Assistance may be provided   


    throughout the activity or intermittently.


3-Partial/Moderate Assistance-helper does LESS THAN HALF the effort. [place] 

lifts, holds or supports trunk or limbs, but provides less than half the effort.


2-Substantial/Maximal Assistance-helper does MORE THAN HALF the effort. [place] 

lifts or holds trunk or limbs and provides more than half the effort.


3-Lunqbajuc-mldyol does ALL the effort. Patient does none of the effort to 

complete the activity. Or, the assistance of 2 or more helpers is required for 

the patient to complete the  


    activity.


If activity was not attempted, code reason:


7-Patient Refused.


9-Not Applicable-not attempted and the patient did not perform the activity 

before the current illness, exacerbation or injury.


10-Not Attempted due to Environmental Limitations-(lack of equipment, weather 

restraints, etc.).


88-Not Attempted due to Medical Conditions or Safety Concerns.


Sit to Stand (QC):  4





Gait Training


Distance:  50'


Walk 10 feet (QC):  4


Walk 50 ft with 2 Turns(QC):  4


Gait Assistive Device:  FWW


able to utilize FWW with use of left hand/UE for stability





Assessment


Patient remains up in recliner with needs met.  Per patient, he utilizes a w/c 

in home.  Family not present to confirm.  PT to increase activity as tolerated 

by patient.





PT Long Term Goals


Long Term Goals


PT Long Term Goals Time Frame:  Jan 27, 2023


Roll Left & Right (QC):  6


Sit to Lying (QC):  6


Lying-Sitting on Side/Bed(QC):  6


Sit to Stand (QC):  4


Chair/Bed-to-Chair Xfer(QC):  4


Walk 10 feet (QC):  4





PT Plan


Treatment/Plan


Treatment Plan:  Continue Plan of Care


Treatment Plan:  Bed Mobility, Education, Functional Activity Yanni, Functional 

Strength, Gait, Safety, Therapeutic Exercise, Transfers


Treatment Duration:  Jan 27, 2023


Frequency:  6 times per week


Estimated Hrs Per Day:  .25 hour per day


Patient and/or Family Agrees t:  Yes





Time


Time In:  904


Time Out:  918


DATE:  Jan 24, 2023


Total Billed Treatment Time:  14


Total Billed Treatment


1 visit


GT 14 min











MARICEL WILSON PT              Jan 24, 2023 10:10

## 2023-01-25 VITALS — DIASTOLIC BLOOD PRESSURE: 73 MMHG | SYSTOLIC BLOOD PRESSURE: 175 MMHG

## 2023-01-25 VITALS — SYSTOLIC BLOOD PRESSURE: 112 MMHG | DIASTOLIC BLOOD PRESSURE: 82 MMHG

## 2023-01-25 VITALS — DIASTOLIC BLOOD PRESSURE: 81 MMHG | SYSTOLIC BLOOD PRESSURE: 176 MMHG

## 2023-01-25 VITALS — DIASTOLIC BLOOD PRESSURE: 51 MMHG | SYSTOLIC BLOOD PRESSURE: 95 MMHG

## 2023-01-25 LAB
ALBUMIN SERPL-MCNC: 3 GM/DL (ref 3.2–4.5)
ALP SERPL-CCNC: 56 U/L (ref 40–136)
ALT SERPL-CCNC: 44 U/L (ref 0–55)
BASOPHILS # BLD AUTO: 0 10^3/UL (ref 0–0.1)
BASOPHILS NFR BLD AUTO: 0 % (ref 0–10)
BILIRUB SERPL-MCNC: 0.7 MG/DL (ref 0.1–1)
BUN/CREAT SERPL: 18
CALCIUM SERPL-MCNC: 8.2 MG/DL (ref 8.5–10.1)
CHLORIDE SERPL-SCNC: 104 MMOL/L (ref 98–107)
CO2 SERPL-SCNC: 22 MMOL/L (ref 21–32)
CREAT SERPL-MCNC: 0.6 MG/DL (ref 0.6–1.3)
EOSINOPHIL # BLD AUTO: 0 10^3/UL (ref 0–0.3)
EOSINOPHIL NFR BLD AUTO: 0 % (ref 0–10)
GFR SERPLBLD BASED ON 1.73 SQ M-ARVRAT: 110 ML/MIN
GLUCOSE SERPL-MCNC: 110 MG/DL (ref 70–105)
HCT VFR BLD CALC: 38 % (ref 40–54)
HGB BLD-MCNC: 13.3 G/DL (ref 13.3–17.7)
LYMPHOCYTES # BLD AUTO: 1 10^3/UL (ref 1–4)
LYMPHOCYTES NFR BLD AUTO: 10 % (ref 12–44)
MAGNESIUM SERPL-MCNC: 1.9 MG/DL (ref 1.6–2.4)
MANUAL DIFFERENTIAL PERFORMED BLD QL: NO
MCH RBC QN AUTO: 33 PG (ref 25–34)
MCHC RBC AUTO-ENTMCNC: 35 G/DL (ref 32–36)
MCV RBC AUTO: 94 FL (ref 80–99)
MONOCYTES # BLD AUTO: 0.9 10^3/UL (ref 0–1)
MONOCYTES NFR BLD AUTO: 10 % (ref 0–12)
NEUTROPHILS # BLD AUTO: 7.8 10^3/UL (ref 1.8–7.8)
NEUTROPHILS NFR BLD AUTO: 80 % (ref 42–75)
PLATELET # BLD: 157 10^3/UL (ref 130–400)
PMV BLD AUTO: 10 FL (ref 9–12.2)
POTASSIUM SERPL-SCNC: 3.7 MMOL/L (ref 3.6–5)
PROT SERPL-MCNC: 5.3 GM/DL (ref 6.4–8.2)
SODIUM SERPL-SCNC: 135 MMOL/L (ref 135–145)
WBC # BLD AUTO: 9.8 10^3/UL (ref 4.3–11)

## 2023-01-25 RX ADMIN — PANTOPRAZOLE SODIUM SCH MG: 40 TABLET, DELAYED RELEASE ORAL at 08:35

## 2023-01-25 RX ADMIN — IPRATROPIUM BROMIDE SCH MG: 0.5 SOLUTION RESPIRATORY (INHALATION) at 09:01

## 2023-01-25 RX ADMIN — DOCUSATE SODIUM SCH MG: 100 CAPSULE ORAL at 08:35

## 2023-01-25 RX ADMIN — Medication SCH MG: at 05:24

## 2023-01-25 RX ADMIN — FOLIC ACID SCH MG: 1 TABLET ORAL at 08:35

## 2023-01-25 RX ADMIN — ASPIRIN SCH MG: 81 TABLET ORAL at 08:35

## 2023-01-25 RX ADMIN — ALBUTEROL SULFATE SCH MG: 2.5 SOLUTION RESPIRATORY (INHALATION) at 09:02

## 2023-01-25 RX ADMIN — CLONIDINE HYDROCHLORIDE PRN MG: 0.1 TABLET ORAL at 04:04

## 2023-01-25 RX ADMIN — SENNOSIDES SCH MG: 8.6 TABLET, FILM COATED ORAL at 08:35

## 2023-01-25 RX ADMIN — METHYLPREDNISOLONE SODIUM SUCCINATE SCH MG: 40 INJECTION, POWDER, FOR SOLUTION INTRAMUSCULAR; INTRAVENOUS at 08:36

## 2023-01-25 RX ADMIN — CLONIDINE HYDROCHLORIDE PRN MG: 0.1 TABLET ORAL at 09:08

## 2023-01-25 RX ADMIN — NICOTINE SCH MG: 21 PATCH, EXTENDED RELEASE TRANSDERMAL at 08:36

## 2023-01-25 RX ADMIN — ENOXAPARIN SODIUM SCH MG: 100 INJECTION SUBCUTANEOUS at 09:08

## 2023-01-25 RX ADMIN — SERTRALINE HYDROCHLORIDE SCH MG: 50 TABLET, FILM COATED ORAL at 08:35

## 2023-01-25 RX ADMIN — CLOPIDOGREL BISULFATE SCH MG: 75 TABLET, FILM COATED ORAL at 08:35

## 2023-01-25 RX ADMIN — AMLODIPINE BESYLATE SCH MG: 5 TABLET ORAL at 08:35

## 2023-01-25 RX ADMIN — METOPROLOL SUCCINATE SCH MG: 50 TABLET, EXTENDED RELEASE ORAL at 08:35

## 2023-01-25 NOTE — DISCHARGE SUMMARY
Discharge Summary


Hospital Course


Was the Problem List Reviewed?:  Yes


Problems/Dx:  


(1) Stroke with right hemiparesis


Status:  Chronic


(2) Hyperlipidemia


Status:  Chronic


(3) Hypertension


Status:  Chronic


(4) Coronary artery disease


Status:  Chronic


(5) Peripheral vascular disease


Status:  Chronic


(6) Acute respiratory failure with hypoxia


Status:  Acute


(7) Acute exacerbation of chronic obstructive pulmonary disease (COPD)


Status:  Acute


(8) Hypomagnesemia


Status:  Acute


Hospital Course


Date of Admission: Jan 17, 2023 at 17:06 


Admission Diagnosis :  





[facility] [place]  





Date of Discharge: 1/25/23 


Discharge Diagnosis: [ ]








Hospital Course:


Lengthy course after he was intubated and maintained in the ICU for acute on 

chronic resp failure. Aggressive care was initiated and overall he was able to 

recover enough to return home on . See bridge noted for details.














Labs and Pending Lab Test:


Laboratory Tests


1/24/23 10:59: Glucometer 150H


1/24/23 16:29: Glucometer 103


1/24/23 20:42: Glucometer 100


1/25/23 04:08: 


White Blood Count 9.8, Red Blood Count 4.05L, Hemoglobin 13.3, Hematocrit 38L, 

Mean Corpuscular Volume 94, Mean Corpuscular Hemoglobin 33, Mean Corpuscular 

Hemoglobin Concent 35, Red Cell Distribution Width 12.4, Platelet Count 157, 

Mean Platelet Volume 10.0, Immature Granulocyte % (Auto) 1, Neutrophils (%) 

(Auto) 80H, Lymphocytes (%) (Auto) 10L, Monocytes (%) (Auto) 10, Eosinophils (%)

(Auto) 0, Basophils (%) (Auto) 0, Neutrophils # (Auto) 7.8, Lymphocytes # (Auto)

1.0, Monocytes # (Auto) 0.9, Eosinophils # (Auto) 0.0, Basophils # (Auto) 0.0, 

Immature Granulocyte # (Auto) 0.1, Sodium Level 135, Potassium Level 3.7, 

Chloride Level 104, Carbon Dioxide Level 22, Anion Gap 9, Blood Urea Nitrogen 

11, Creatinine 0.60, Estimat Glomerular Filtration Rate 110, BUN/Creatinine 

Ratio 18, Glucose Level 110H, Calcium Level 8.2L, Corrected Calcium 9.0, 

Magnesium Level 1.9, Total Bilirubin 0.7, Aspartate Amino Transf (AST/SGOT) 27, 

Alanine Aminotransferase (ALT/SGPT) 44, Alkaline Phosphatase 56, Total Protein 

5.3L, Albumin 3.0L





Microbiology


1/18/23 MRSA Screen - Final, Complete


          MRSA not isolated


1/17/23 Blood Culture - Final, Complete


          No growth





Home Meds


Active


Vitamin B-1 (Thiamine HCl) 100 Mg Tablet 100 Mg PO DAILY@0700


Folic Acid 1 Mg Tablet 1 Mg PO DAILY


Oxyir Tablet (Oxycodone HCl) 5 Mg Tab 5 Mg PO Q4HR PRN


Amlodipine Besylate 5 Mg Tablet 5 Mg PO DAILY


Reported


Aleve (Naproxen Sodium) 220 Mg Capsule 220-440 Mg PO 1200


Iprat-Albut 0.5-3(2.5) mg/3 ml (Ipratropium/Albuterol Sulfate) 0.5 Mg-3 Mg (2.5 

Mg Base)/3 Ml Ampul.neb 3 Ml IH Q6H PRN


Atorvastatin Calcium 20 Mg Tablet 20 Mg PO DAILY


     LAST FILLED 10- #30/30 DAY SUPPLY


Pantoprazole Sodium 40 Mg Tablet.dr 40 Mg PO DAILY


Sertraline HCl 50 Mg Tablet 50 Mg PO DAILY


Aspirin EC (Aspirin) 81 Mg Tablet.dr 81 Mg PO DAILY


Clopidogrel (Clopidogrel Bisulfate) 75 Mg Tablet 75 Mg PO DAILY


Metoprolol Succinate 50 Mg Tab.er.24h 50 Mg PO DAILY


Assessment/Pt Instructions


PCP 1 week


Discharge Planning:  <30 minutes discharge planning





Discharge Instructions


Discharge Diet:  No Restrictions





Discharge Physical Examination


Vital Signs





Vital Signs








  Date Time  Temp Pulse Resp B/P (MAP) Pulse Ox O2 Delivery O2 Flow Rate FiO2


 


1/25/23 09:03     96 Room Air  


 


1/25/23 07:43 36.0 70 18 175/73 (107)   1.00 





       1.00 


 


1/21/23 02:35        30








General Appearance:  No Apparent Distress, WD/WN, Chronically ill


Respiratory:  Normal Breath Sounds


Allergies:  


Coded Allergies:  


     No Known Drug Allergies (Unverified , 5/30/22)





Discharge Summary


Date of Admission


Jan 17, 2023 at 17:06


Date of Discharge





Discharge Date:  Jan 25, 2023


Discharge Diagnosis


DC HH tomorrow





(1) Stroke with right hemiparesis


Status:  Chronic


Assessment & Plan:  -Resume home medications: ASA 81mg qd, atorvastatin 20mg qd


-PT/OT services for rehabilitation 





(2) Hyperlipidemia


Status:  Chronic


Assessment & Plan:  -Resume home medication atorvastatin 20mg qd





(3) Hypertension


Status:  Chronic


Assessment & Plan:  Monitor BP


Consider resuming home medications amlodipine 5mg qd, lisinopril 20mg qd, 

metoprolol 50mg qd if BP >140/90





(4) Coronary artery disease


Status:  Chronic


Assessment & Plan:  Resume home medications: ASA 81mg qd, atorvastatin 20mg qd, 

and metoprolol 50mg if BP and HR will tolerate





(5) Peripheral vascular disease


Status:  Chronic


Assessment & Plan:  -Stent place in R LE in the past month at [place] per 

patient report. Continue home medications: Plavix 75mg qd





(6) Acute respiratory failure with hypoxia


Status:  Acute


Assessment & Plan:  1/18: 


Acute respiratory failure with hypoxia likely secondary to AECOPD. Patient is 

intubated and sedated. Appreciate plan per Critical Care. Spontaneous breathing 

trial this evening. Potential extubation on 1/19.


-Antibiotic therapy: vancomycin 1250mg x3days with trough, Rocephin 1gm x5days, 

azithromycin 250mg x5days 


-Systemic steroids: solumedrol 40mg IV qd


-Duoneb 3ml RTQ4H INH


-PT/OT


-Protonix 40mg IV qd for stress ulcer prophylaxis





1/19: 


-Continue medication management and appreciate plan per Critical Care


-Ativan 1-2mg PO Q3H PRN for anxiety management to help decrease agitation


-MRSA not isolated, consider discontinuing vancomycin





(7) Acute exacerbation of chronic obstructive pulmonary disease (COPD)


Status:  Acute


Assessment & Plan:  1/18: 


Acute respiratory failure with hypoxia likely secondary to AECOPD. Patient is 

intubated and sedated. Appreciate plan per Critical Care. Spontaneous breathing 

trial this evening. Potential extubation on 1/19.


-Antibiotic therapy: vancomycin 1250mg x3days with trough, Rocephin 1gm x5days, 

azithromycin 250mg x5days 


-Systemic steroids: solumedrol 40mg IV qd


-Duoneb 3ml RTQ4H INH


-PT/OT





(8) Hypomagnesemia


Status:  Acute


Assessment & Plan:  1/18: Mg 1.3. Replace with Magnesium sulfate 2gm. Monitor 

with AM Mg. Continue to replace if Mg <1.7














CANDIS WONG DO                Jan 25, 2023 09:47

## 2023-01-25 NOTE — OCC THERAPY PROGRESS NOTE
Therapy Progress Note


OT arrived for therapy session, patient w/ RT. OT will return when patient 

available











DRINNEN,MICHELLE OT            Jan 25, 2023 09:06

## 2023-01-25 NOTE — PHYSICAL THERAPY DAILY NOTE
PT Daily Note-Current


Subjective


Patient in bed pre tx, agrees to PT, has no complaints of pain.





Pain


Section J - Health Conditions


1. Rarely or not at all


2. Occasionally


3. Frequently


4. Almost constantly


8. Unable to answer


Pain Effect on Sleep:  2


Pain Interference with Therapy:  2


Pain Interference w/Day-to-Day:  2





Appearance


Patient in bed post tx with nurse call, phone, tray, all needs met.





Mental Status


Patient Orientation:  Person, Place, Situation





Transfers


SCALE: Activities may be completed with or without assistive devices.





6-Indepedent-patient completes the activity by him/herself with no assistance 

from a helper.


5-Set-up or Clean-up Assistance-helper sets up or cleans up; patient completes 

activity. [place] assists only prior to or  


    following the activity.


4-Supervision or Touching Assistance-helper provides verbal cues and/or 

touching/steadying and/or contact guard assistance as patient completes 

activity. Assistance may be provided   


    throughout the activity or intermittently.


3-Partial/Moderate Assistance-helper does LESS THAN HALF the effort. [place] 

lifts, holds or supports trunk or limbs, but provides less than half the effort.


2-Substantial/Maximal Assistance-helper does MORE THAN HALF the effort. [place] 

lifts or holds trunk or limbs and provides more than half the effort.


1-Astmyvmmy-zmzhmm does ALL the effort. Patient does none of the effort to 

complete the activity. Or, the assistance of 2 or more helpers is required for 

the patient to complete the  


    activity.


If activity was not attempted, code reason:


7-Patient Refused.


9-Not Applicable-not attempted and the patient did not perform the activity 

before the current illness, exacerbation or injury.


10-Not Attempted due to Environmental Limitations-(lack of equipment, weather 

restraints, etc.).


88-Not Attempted due to Medical Conditions or Safety Concerns.


Roll Left & Right (QC):  6


Sit to Lying (QC):  6


Lying to Sitting/Side of Bed(Q:  6


Sit to Stand (QC):  4


Chair/Bed-to-Chair Xfer(QC):  4





Gait Training


Distance:  80'


Walk 10 feet (QC):  4


Walk 50 ft with 2 Turns(QC):  4


Gait Persons Needed:  1


Gait Assistive Device:  FWW


CGA, patient has some unsteadiness but no LOB, uses walker with one hand





Exercises


Supine Ex:  Ankle pumps, Quad Set, Heel Slides


Supine Reps:  20





Treatments


bed mobility and transfers, ambulation, LE ROM





Assessment


Current Status:  Fair Progress


improving general mobility





PT Long Term Goals


Long Term Goals


PT Long Term Goals Time Frame:  Jan 27, 2023


Roll Left & Right (QC):  6


Sit to Lying (QC):  6


Lying-Sitting on Side/Bed(QC):  6


Sit to Stand (QC):  4


Chair/Bed-to-Chair Xfer(QC):  4


Walk 10 feet (QC):  4





PT Plan


Problem List


Problem List:  Activity Tolerance, Functional Strength, Safety, Balance, Gait, 

Transfer, Bed Mobility, ROM





Treatment/Plan


Treatment Plan:  Continue Plan of Care


Treatment Plan:  Bed Mobility, Education, Functional Activity Yanni, Functional 

Strength, Gait, Safety, Therapeutic Exercise, Transfers


Treatment Duration:  Jan 27, 2023


Frequency:  6 times per week


Estimated Hrs Per Day:  .25 hour per day


Patient and/or Family Agrees t:  Yes





Safety Risks/Education


Patient Education:  Gait Training, Transfer Techniques, Correct Positioning, 

Safety Issues


Teaching Recipient:  Patient


Teaching Methods:  Demonstration, Discussion


Response to Teaching:  Reinforcement Needed





Time


Time In:  0940


Time Out:  0950


DATE:  Jan 25, 2023


Total Billed Treatment Time:  10


Total Billed Treatment


1 visit


FA 10'











KRTEK,NORI PT                Jan 25, 2023 10:07

## 2023-01-25 NOTE — D/C HH FACE TO FACE ORDER
D/C HH Face to Face Orders


Reconcile Patient Problems


Problems Reviewed?:  Yes





Instructions for Patient


HH


Patient Instructions/FollowUp:  


pcp 1 week


Physician to follow Patient:  [facility]


Discharge Diet for Home:  No Restrictions


Patient Problems:  


Debility





Patient Data-Allergies,Ht & Wt


Patient Allergies:  


Coded Allergies:  


     No Known Drug Allergies (Unverified , 5/30/22)





Home Health Need/Face to Face


Date of Face to Face:  Jan 25, 2023


Clinical Findings:  Generalized weakness and fatigue, Instability, Muscle 

weakness, Unsteady gait


I have seen Pt face-to-face:  Yes


Discharged To:  Home


Diagnosis/Conditions:  


Debility


Patient is Homebound due to:  Beti fall risk due to instabilty, Muscle weakness


Homebound Status


   Due to the above stated illness, injury or surgical procedure (medical 

condition or diagnosis) and associated clinical findings, the patient is 

homebound because of his/her inability to leave home except with aid of a 

supportive device and/or person AND leaving the home requires a considerable and

taxing effort or is medically contraindicated.


Pt req the following assistanc:  Walker





Home Health Nursing Orders


Home Health Services Order:  Nursing Services, Occupational Ther-Evaluate & 

Treat, Physical Therapy-Evaluate & Treat





Home Health Infusion Therapy


Line Start Date:  Jan 18, 2023


Certify Stmt


I certify that this patient is under my care and that I, a nurse practitioner or

a physician; a assistant working with me, had a face to face encounter that -

meets the physician face to face encounter requirements with this patient as 

dated.











CANDIS WONG DO                Jan 25, 2023 09:46

## 2023-01-25 NOTE — PROGRESS NOTE
LADARIUS CHRISTINA 1/25/23 8613:


Progress Note


Brett Gamez is a 61 yo M with a history of COPD, HFpEF, HTN, CAD and 

previous stroke who presented to the ER on 1/17 for worsening SOB that began 

that day. He was found to be in acute hypoxic respiratory failure and placed on 

CPAP. He was transported to [facility] by EMS who noted a oxygen saturation of 

82% on 100% CPAP as well as hypotension requiring dobutamine. Due to continued 

respiratory decline, he was intubated shortly after admission with vent settings

at 500/14/6/30%. Pressors were continued to unresolved hypotension. CXR obtained

this day demonstrated COPD and a patchy infiltrate and the right lung field; 

antibiotics covering CAP, duonebs, and methylprednisone were started. On 1/19, 

Zosin was started to cover HCAP per worsening infiltrate on CXR. On 1/19, the 

patient was extubated by [facility] and transitioned to nasal canula 5L. Several 

hours later, oxygen saturation declined to 88% and bilateral crackles were 

observed on ascultation; the patient was started on vapotherm and after CXR 

showed bilateral effusions, Lasix was started. On 1/20 his hypotension resolved 

and vasopressors were discontinued. The patients respiratory status improved 

after diuresis of 5L in UO, and he was restarted on nasal canula on 12/21; 

thereafter, his oxygen was maintained on nasal canula and he denied all dyspnea.

He appeared comfortable though weak, and PT was started on 12/21. He remained 

hemodynamically stable apart from fluctuating hypertension consistent with his 

baseline in from 1/21 onward. Due to marked improvement in strength observed on 

1/24, he is to be discharged home on 1/25 with [facility] pending 

continued stability.





TRACI WONG DO 1/25/23 2432:


Supervisory-Addendum Brief


Verification & Attestation


Participated in pt care:  history, MDM, physical


Personally performed:  exam, history, MDM, supervision of care


Care discussed with:  Medical Student


Procedures:  n/a


Results interpretation:  Verified all documentation


Verification and Attestation of Medical Student E/M Service





A medical student performed and documented this service in my presence. I 

reviewed and verified all information documented by the medical student and made

modifications to such information, when appropriate. I personally performed the 

physical exam and medical decision making. 





 Traci Wong Jan 25, 2023,21:02











LADARIUS CHRISTINA                     Jan 25, 2023 17:43


TRACI WONG DO                Jan 25, 2023 21:02

## 2023-02-03 ENCOUNTER — HOSPITAL ENCOUNTER (INPATIENT)
Dept: HOSPITAL 75 - ER FS | Age: 63
LOS: 6 days | DRG: 207 | End: 2023-02-09
Attending: INTERNAL MEDICINE | Admitting: FAMILY MEDICINE
Payer: MEDICARE

## 2023-02-03 VITALS — SYSTOLIC BLOOD PRESSURE: 117 MMHG | DIASTOLIC BLOOD PRESSURE: 57 MMHG

## 2023-02-03 VITALS — DIASTOLIC BLOOD PRESSURE: 64 MMHG | SYSTOLIC BLOOD PRESSURE: 141 MMHG

## 2023-02-03 VITALS — DIASTOLIC BLOOD PRESSURE: 65 MMHG | SYSTOLIC BLOOD PRESSURE: 89 MMHG

## 2023-02-03 VITALS — HEIGHT: 70 IN | BODY MASS INDEX: 21.97 KG/M2 | WEIGHT: 153.44 LBS

## 2023-02-03 VITALS — DIASTOLIC BLOOD PRESSURE: 79 MMHG | SYSTOLIC BLOOD PRESSURE: 145 MMHG

## 2023-02-03 DIAGNOSIS — I21.A1: ICD-10-CM

## 2023-02-03 DIAGNOSIS — I11.0: ICD-10-CM

## 2023-02-03 DIAGNOSIS — I50.33: ICD-10-CM

## 2023-02-03 DIAGNOSIS — E78.2: ICD-10-CM

## 2023-02-03 DIAGNOSIS — Z66: ICD-10-CM

## 2023-02-03 DIAGNOSIS — I69.351: ICD-10-CM

## 2023-02-03 DIAGNOSIS — F17.210: ICD-10-CM

## 2023-02-03 DIAGNOSIS — Z79.899: ICD-10-CM

## 2023-02-03 DIAGNOSIS — F10.10: ICD-10-CM

## 2023-02-03 DIAGNOSIS — J44.1: ICD-10-CM

## 2023-02-03 DIAGNOSIS — J96.02: ICD-10-CM

## 2023-02-03 DIAGNOSIS — Z20.822: ICD-10-CM

## 2023-02-03 DIAGNOSIS — Z95.5: ICD-10-CM

## 2023-02-03 DIAGNOSIS — N39.0: ICD-10-CM

## 2023-02-03 DIAGNOSIS — I95.9: ICD-10-CM

## 2023-02-03 DIAGNOSIS — Z79.82: ICD-10-CM

## 2023-02-03 DIAGNOSIS — E87.21: ICD-10-CM

## 2023-02-03 DIAGNOSIS — I87.2: ICD-10-CM

## 2023-02-03 DIAGNOSIS — J96.01: Primary | ICD-10-CM

## 2023-02-03 DIAGNOSIS — I49.01: ICD-10-CM

## 2023-02-03 DIAGNOSIS — D64.9: ICD-10-CM

## 2023-02-03 DIAGNOSIS — J18.9: ICD-10-CM

## 2023-02-03 DIAGNOSIS — I73.9: ICD-10-CM

## 2023-02-03 DIAGNOSIS — Z51.5: ICD-10-CM

## 2023-02-03 DIAGNOSIS — J44.0: ICD-10-CM

## 2023-02-03 DIAGNOSIS — I27.20: ICD-10-CM

## 2023-02-03 DIAGNOSIS — G93.1: ICD-10-CM

## 2023-02-03 DIAGNOSIS — I25.10: ICD-10-CM

## 2023-02-03 LAB
ALBUMIN SERPL-MCNC: 3 GM/DL (ref 3.2–4.5)
ALP SERPL-CCNC: 79 U/L (ref 40–136)
ALT SERPL-CCNC: 16 U/L (ref 0–55)
AMORPH SED URNS QL MICRO: (no result) /LPF
APTT PPP: YELLOW S
ARTERIAL PATENCY WRIST A: (no result)
ARTERIAL PATENCY WRIST A: (no result)
BACTERIA #/AREA URNS HPF: (no result) /HPF
BASE EXCESS STD BLDA CALC-SCNC: -0.7 MMOL/L (ref -2.5–2.5)
BASE EXCESS STD BLDA CALC-SCNC: -12.6 MMOL/L (ref -2.5–2.5)
BASE EXCESS STD BLDA CALC-SCNC: -4.8 MMOL/L (ref -2.5–2.5)
BASOPHILS # BLD AUTO: 0.1 10^3/UL (ref 0–0.1)
BASOPHILS NFR BLD AUTO: 0 % (ref 0–10)
BDY SITE: (no result)
BILIRUB SERPL-MCNC: 0.4 MG/DL (ref 0.1–1)
BILIRUB UR QL STRIP: NEGATIVE
BODY TEMPERATURE: 36.1
BODY TEMPERATURE: 36.4
BUN/CREAT SERPL: 6
BUN/CREAT SERPL: 9
CALCIUM SERPL-MCNC: 8.1 MG/DL (ref 8.5–10.1)
CALCIUM SERPL-MCNC: 8.2 MG/DL (ref 8.5–10.1)
CHLORIDE SERPL-SCNC: 102 MMOL/L (ref 98–107)
CHLORIDE SERPL-SCNC: 102 MMOL/L (ref 98–107)
CO2 BLDA CALC-SCNC: 20.6 MMOL/L (ref 21–31)
CO2 BLDA CALC-SCNC: 24.9 MMOL/L (ref 21–31)
CO2 BLDA CALC-SCNC: 25.9 MMOL/L (ref 21–31)
CO2 SERPL-SCNC: 21 MMOL/L (ref 21–32)
CO2 SERPL-SCNC: 22 MMOL/L (ref 21–32)
CREAT SERPL-MCNC: 0.78 MG/DL (ref 0.6–1.3)
CREAT SERPL-MCNC: 0.81 MG/DL (ref 0.6–1.3)
EOSINOPHIL # BLD AUTO: 0 10^3/UL (ref 0–0.3)
EOSINOPHIL NFR BLD AUTO: 0 % (ref 0–10)
FIBRINOGEN PPP-MCNC: (no result) MG/DL
GFR SERPLBLD BASED ON 1.73 SQ M-ARVRAT: 100 ML/MIN
GFR SERPLBLD BASED ON 1.73 SQ M-ARVRAT: 101 ML/MIN
GLUCOSE SERPL-MCNC: 154 MG/DL (ref 70–105)
GLUCOSE SERPL-MCNC: 213 MG/DL (ref 70–105)
GLUCOSE UR STRIP-MCNC: (no result) MG/DL
HCT VFR BLD CALC: 38 % (ref 40–54)
HCT VFR BLD CALC: 41 % (ref 40–54)
HGB BLD-MCNC: 12.6 G/DL (ref 13.3–17.7)
HGB BLD-MCNC: 13.6 G/DL (ref 13.3–17.7)
HYALINE CASTS #/AREA URNS LPF: >50 /LPF
INHALED O2 FLOW RATE: (no result) L/MIN
INHALED O2 FLOW RATE: 20 L/MIN
KETONES UR QL STRIP: NEGATIVE
LEUKOCYTE ESTERASE UR QL STRIP: NEGATIVE
LYMPHOCYTES # BLD AUTO: 1.6 10^3/UL (ref 1–4)
LYMPHOCYTES NFR BLD AUTO: 10 % (ref 12–44)
MACROCYTES BLD QL SMEAR: SLIGHT
MAGNESIUM SERPL-MCNC: 1.6 MG/DL (ref 1.6–2.4)
MAGNESIUM SERPL-MCNC: 2 MG/DL (ref 1.6–2.4)
MANUAL DIFFERENTIAL PERFORMED BLD QL: YES
MCH RBC QN AUTO: 33 PG (ref 25–34)
MCH RBC QN AUTO: 33 PG (ref 25–34)
MCHC RBC AUTO-ENTMCNC: 33 G/DL (ref 32–36)
MCHC RBC AUTO-ENTMCNC: 34 G/DL (ref 32–36)
MCV RBC AUTO: 100 FL (ref 80–99)
MCV RBC AUTO: 98 FL (ref 80–99)
MONOCYTES # BLD AUTO: 1.5 10^3/UL (ref 0–1)
MONOCYTES NFR BLD AUTO: 9 % (ref 0–12)
MONOCYTES NFR BLD: 7 %
MYELOCYTES NFR BLD: 2 %
NEUTROPHILS # BLD AUTO: 12.6 10^3/UL (ref 1.8–7.8)
NEUTROPHILS NFR BLD AUTO: 79 % (ref 42–75)
NEUTS BAND NFR BLD MANUAL: 71 %
NEUTS BAND NFR BLD: 11 %
NITRITE UR QL STRIP: NEGATIVE
PCO2 BLDA: 46 MMHG (ref 35–45)
PCO2 BLDA: 64 MMHG (ref 35–45)
PCO2 BLDA: 67 MMHG (ref 35–45)
PH BLDA: 7.05 [PH] (ref 7.37–7.43)
PH BLDA: 7.17 [PH] (ref 7.37–7.43)
PH BLDA: 7.34 [PH] (ref 7.37–7.43)
PH UR STRIP: 6.5 [PH] (ref 5–9)
PLATELET # BLD EST: NORMAL 10*3/UL
PLATELET # BLD: 178 10^3/UL (ref 130–400)
PLATELET # BLD: 193 10^3/UL (ref 130–400)
PMV BLD AUTO: 10 FL (ref 9–12.2)
PMV BLD AUTO: 9.8 FL (ref 9–12.2)
PO2 BLDA: 33 MMHG (ref 79–93)
PO2 BLDA: 64 MMHG (ref 79–93)
PO2 BLDA: 71 MMHG (ref 79–93)
POTASSIUM SERPL-SCNC: 3.6 MMOL/L (ref 3.6–5)
POTASSIUM SERPL-SCNC: 3.6 MMOL/L (ref 3.6–5)
PROT SERPL-MCNC: 5.3 GM/DL (ref 6.4–8.2)
PROT UR QL STRIP: (no result)
RBC #/AREA URNS HPF: (no result) /HPF
SAO2 % BLDA FROM PO2: 58 % (ref 94–100)
SAO2 % BLDA FROM PO2: 84 % (ref 94–100)
SAO2 % BLDA FROM PO2: 87 % (ref 94–100)
SODIUM SERPL-SCNC: 140 MMOL/L (ref 135–145)
SODIUM SERPL-SCNC: 140 MMOL/L (ref 135–145)
SP GR UR STRIP: >=1.03 (ref 1.02–1.02)
SQUAMOUS #/AREA URNS HPF: (no result) /HPF
TRIGL SERPL-MCNC: 85 MG/DL (ref ?–150)
VARIANT LYMPHS NFR BLD MANUAL: 9 %
VENTILATION MODE VENT: YES
WBC # BLD AUTO: 16 10^3/UL (ref 4.3–11)
WBC # BLD AUTO: 16.4 10^3/UL (ref 4.3–11)

## 2023-02-03 PROCEDURE — 85025 COMPLETE CBC W/AUTO DIFF WBC: CPT

## 2023-02-03 PROCEDURE — 84484 ASSAY OF TROPONIN QUANT: CPT

## 2023-02-03 PROCEDURE — 87636 SARSCOV2 & INF A&B AMP PRB: CPT

## 2023-02-03 PROCEDURE — 36415 COLL VENOUS BLD VENIPUNCTURE: CPT

## 2023-02-03 PROCEDURE — 84478 ASSAY OF TRIGLYCERIDES: CPT

## 2023-02-03 PROCEDURE — 94002 VENT MGMT INPAT INIT DAY: CPT

## 2023-02-03 PROCEDURE — 70450 CT HEAD/BRAIN W/O DYE: CPT

## 2023-02-03 PROCEDURE — 80053 COMPREHEN METABOLIC PANEL: CPT

## 2023-02-03 PROCEDURE — 71045 X-RAY EXAM CHEST 1 VIEW: CPT

## 2023-02-03 PROCEDURE — 94003 VENT MGMT INPAT SUBQ DAY: CPT

## 2023-02-03 PROCEDURE — 36680 INSERT NEEDLE BONE CAVITY: CPT

## 2023-02-03 PROCEDURE — 84100 ASSAY OF PHOSPHORUS: CPT

## 2023-02-03 PROCEDURE — 83880 ASSAY OF NATRIURETIC PEPTIDE: CPT

## 2023-02-03 PROCEDURE — 80048 BASIC METABOLIC PNL TOTAL CA: CPT

## 2023-02-03 PROCEDURE — 94640 AIRWAY INHALATION TREATMENT: CPT

## 2023-02-03 PROCEDURE — 87205 SMEAR GRAM STAIN: CPT

## 2023-02-03 PROCEDURE — 83735 ASSAY OF MAGNESIUM: CPT

## 2023-02-03 PROCEDURE — 85007 BL SMEAR W/DIFF WBC COUNT: CPT

## 2023-02-03 PROCEDURE — 87040 BLOOD CULTURE FOR BACTERIA: CPT

## 2023-02-03 PROCEDURE — 94799 UNLISTED PULMONARY SVC/PX: CPT

## 2023-02-03 PROCEDURE — 5A1955Z RESPIRATORY VENTILATION, GREATER THAN 96 CONSECUTIVE HOURS: ICD-10-PCS | Performed by: FAMILY MEDICINE

## 2023-02-03 PROCEDURE — 87070 CULTURE OTHR SPECIMN AEROBIC: CPT

## 2023-02-03 PROCEDURE — 87081 CULTURE SCREEN ONLY: CPT

## 2023-02-03 PROCEDURE — 83605 ASSAY OF LACTIC ACID: CPT

## 2023-02-03 PROCEDURE — 02HV33Z INSERTION OF INFUSION DEVICE INTO SUPERIOR VENA CAVA, PERCUTANEOUS APPROACH: ICD-10-PCS | Performed by: SURGERY

## 2023-02-03 PROCEDURE — 36600 WITHDRAWAL OF ARTERIAL BLOOD: CPT

## 2023-02-03 PROCEDURE — 82805 BLOOD GASES W/O2 SATURATION: CPT

## 2023-02-03 PROCEDURE — 99291 CRITICAL CARE FIRST HOUR: CPT

## 2023-02-03 PROCEDURE — 93308 TTE F-UP OR LMTD: CPT

## 2023-02-03 PROCEDURE — 81000 URINALYSIS NONAUTO W/SCOPE: CPT

## 2023-02-03 PROCEDURE — 82947 ASSAY GLUCOSE BLOOD QUANT: CPT

## 2023-02-03 PROCEDURE — 93005 ELECTROCARDIOGRAM TRACING: CPT

## 2023-02-03 PROCEDURE — 5A12012 PERFORMANCE OF CARDIAC OUTPUT, SINGLE, MANUAL: ICD-10-PCS | Performed by: FAMILY MEDICINE

## 2023-02-03 PROCEDURE — 85027 COMPLETE CBC AUTOMATED: CPT

## 2023-02-03 PROCEDURE — 51702 INSERT TEMP BLADDER CATH: CPT

## 2023-02-03 PROCEDURE — 87088 URINE BACTERIA CULTURE: CPT

## 2023-02-03 PROCEDURE — 0BH17EZ INSERTION OF ENDOTRACHEAL AIRWAY INTO TRACHEA, VIA NATURAL OR ARTIFICIAL OPENING: ICD-10-PCS | Performed by: FAMILY MEDICINE

## 2023-02-03 RX ADMIN — SODIUM CHLORIDE, SODIUM LACTATE, POTASSIUM CHLORIDE, AND CALCIUM CHLORIDE SCH MLS/HR: 600; 310; 30; 20 INJECTION, SOLUTION INTRAVENOUS at 15:17

## 2023-02-03 RX ADMIN — LORAZEPAM SCH MLS/HR: 2 INJECTION INTRAMUSCULAR; INTRAVENOUS at 17:09

## 2023-02-03 RX ADMIN — LORAZEPAM PRN MG: 2 INJECTION INTRAMUSCULAR; INTRAVENOUS at 16:32

## 2023-02-03 RX ADMIN — METOPROLOL TARTRATE SCH MG: 1 INJECTION, SOLUTION INTRAVENOUS at 23:40

## 2023-02-03 RX ADMIN — ENOXAPARIN SODIUM SCH MG: 100 INJECTION SUBCUTANEOUS at 11:03

## 2023-02-03 RX ADMIN — METHYLPREDNISOLONE SODIUM SUCCINATE SCH MG: 125 INJECTION, POWDER, FOR SOLUTION INTRAMUSCULAR; INTRAVENOUS at 18:16

## 2023-02-03 RX ADMIN — ALBUTEROL SULFATE SCH MG: 2.5 SOLUTION RESPIRATORY (INHALATION) at 19:03

## 2023-02-03 RX ADMIN — SODIUM CHLORIDE SCH MLS/HR: 900 INJECTION INTRAVENOUS at 11:03

## 2023-02-03 RX ADMIN — METOPROLOL TARTRATE SCH MG: 1 INJECTION, SOLUTION INTRAVENOUS at 18:16

## 2023-02-03 RX ADMIN — PANTOPRAZOLE SODIUM SCH MG: 40 INJECTION, POWDER, FOR SOLUTION INTRAVENOUS at 13:46

## 2023-02-03 RX ADMIN — METOPROLOL TARTRATE SCH MG: 1 INJECTION, SOLUTION INTRAVENOUS at 11:38

## 2023-02-03 RX ADMIN — Medication SCH MLS/HR: at 17:38

## 2023-02-03 RX ADMIN — METHYLPREDNISOLONE SODIUM SUCCINATE SCH MG: 125 INJECTION, POWDER, FOR SOLUTION INTRAMUSCULAR; INTRAVENOUS at 23:40

## 2023-02-03 RX ADMIN — SODIUM CHLORIDE SCH MLS/HR: 900 INJECTION INTRAVENOUS at 23:40

## 2023-02-03 RX ADMIN — METHYLPREDNISOLONE SODIUM SUCCINATE SCH MG: 125 INJECTION, POWDER, FOR SOLUTION INTRAMUSCULAR; INTRAVENOUS at 11:03

## 2023-02-03 RX ADMIN — SODIUM CHLORIDE SCH MLS/HR: 900 INJECTION INTRAVENOUS at 18:16

## 2023-02-03 RX ADMIN — PROPOFOL SCH MLS/HR: 10 INJECTION, EMULSION INTRAVENOUS at 15:00

## 2023-02-03 RX ADMIN — ALBUTEROL SULFATE SCH MG: 2.5 SOLUTION RESPIRATORY (INHALATION) at 21:50

## 2023-02-03 NOTE — DIAGNOSTIC IMAGING REPORT
PROCEDURE: CT head without contrast.



TECHNIQUE: Multiple contiguous axial images were obtained through

the brain without the use of intravenous contrast. Auto Exposure

Controls were utilized during the CT exam to meet ALARA standards

for radiation dose reduction. 



INDICATION: Post code and seizure-like activity.



COMPARISON: Correlation is made with prior CT from 06/03/2022.



FINDINGS: Encephalomalacia in the left middle cerebral artery

territory is again noted consistent with prior stroke. There is

an old infarct in the region of the left caudate. No sulcal

effacement is identified. There is no midline shift. No acute

intra-axial or extra-axial hemorrhage is detected. Cisterns are

patent. Visualized paranasal sinuses are clear.



IMPRESSION: Stable chronic changes since exam from June 2022. No

acute intracranial process is detected.



Dictated by: 



  Dictated on workstation # [id number]

## 2023-02-03 NOTE — TELE-ICU PROGRESS NOTE
Progress Note





Multiple visits via camera 


patient with  musculat contractions - myoclonus vs seizures


CTH ordered - reviewed -  no  bleeding 





Tx with ativan prn , Keppra IV , Dilantin , propofol gtt and ativan gtt





discussed with wife and daughter 


discussed with Dr Mueller 





blood work ordered , reviewed 





Plans in collaboration with   bedside consultants and IM MDs.


Discussed with RN to reach out if any questions or concerns


A total of 45  minutes of critical care time was devoted to this patient today, 

required to treat and/or prevent further deterioration of  critical care 

condition ( as above ) .





Focused Exam


Lactate Level


2/3/23 08:18: Lactic Acid Level 8.10*H


2/3/23 10:54: Lactic Acid Level 6.78*H


2/3/23 14:07: 


Height, Weight, BMI


Height: '"


Weight: lbs. oz. kg; 22.04 BMI


Method:


Lactic Acid Level





Laboratory Tests








Test


 2/3/23


14:07

















TON MOHAMUD MD          Feb 3, 2023 17:04

## 2023-02-03 NOTE — DIAGNOSTIC IMAGING REPORT
INDICATION: Respiratory distress



Portable chest 8:27 AM



There is an ET tube projecting over the trachea. NG tube appears

to enter the stomach. There are faint alveolar infiltrates in

both lungs. There is interstitial prominence. There are no

effusions or pneumothoraces.



IMPRESSION: Improving aeration of the lungs compared to

01/20/2023. There continues to be some faint groundglass

infiltrate and interstitial prominence in the lungs.



Dictated by: 



  Dictated on workstation # [id number]

## 2023-02-03 NOTE — ED CARDIAC GENERAL
History of Present Illness


General


Chief Complaint:  Unresponsive


Stated Complaint:  CODE BLUE


Source:  family


Exam Limitations:  no limitations





History of Present Illness


Date Seen by Provider:  Feb 3, 2023


Time Seen by Provider:  08:05


Initial Comments


Patient is a 62-year-old male with history of COPD with recent 8-day hospit

alization at [place] [facility] for respiratory failure requiring ventilation

who presents status post witnessed cardiac arrest.  Patient was downstairs when 

he yelled for breath stating he was short of breath.  The patient does not 

require oxygen at home and does not wear CPAP at night.  The patient then 

attempted to stand and collapsed.  He was unresponsive and upon family members 

arrival was apneic and pulseless.  Chest compressions were started and on EMS 

arrival, the patient was apneic, pulseless and found to be in V. fib and was 

defibrillated once.  The patient had approximately 10 minutes of chest 

compressions, received multiple doses of bicarb and epinephrine prior to return 

of his spontaneous circulation.  An IO was placed and the patient was intubated 

without sedation.  History is limited due to the patient's clinical condition.


Timing/Duration:  1/2 hour


Severity:  severe


Activities at Onset:  other


Prior CP/Workup:  other


Modifying Factors:  improves with other


Associated Systoms:  Other





Allergies and Home Medications


Allergies


Coded Allergies:  


     No Known Drug Allergies (Unverified , 5/30/22)





Patient Home Medication List


Home Medication List Reviewed:  Yes


Amlodipine Besylate (Amlodipine Besylate) 5 Mg Tablet, 5 MG PO DAILY


   Prescribed by: CANDIS WONG on 1/25/23 0943


Aspirin (Aspirin EC) 81 Mg Tablet.dr, 81 MG PO DAILY, (Reported)


   Entered as Reported by: DAYLIN JAY on 1/19/23 1257


Atorvastatin Calcium (Atorvastatin Calcium) 20 Mg Tablet, 20 MG PO DAILY, 

(Reported)


   Entered as Reported by: DAYLIN JAY on 1/19/23 1257


Clopidogrel Bisulfate (Clopidogrel) 75 Mg Tablet, 75 MG PO DAILY, (Reported)


   Entered as Reported by: DAYLIN JAY on 1/19/23 1257


Folic Acid (Folic Acid) 1 Mg Tablet, 1 MG PO DAILY


   Prescribed by: CANDIS WONG on 1/25/23 0944


Ipratropium/Albuterol Sulfate (Iprat-Albut 0.5-3(2.5) mg/3 ml) 0.5 Mg-3 Mg (2.5 

Mg Base)/3 Ml Ampul.neb, 3 ML IH Q6H PRN for SHORTNESS OF BREATH, (Reported)


   Entered as Reported by: DAYLIN JAY on 1/19/23 1257


Metoprolol Succinate (Metoprolol Succinate) 50 Mg Tab.er.24h, 50 MG PO DAILY, 

(Reported)


   Entered as Reported by: DAYLIN JAY on 1/19/23 1257


Oxycodone Hcl (Oxyir Tablet) 5 Mg Tab, 5 MG PO Q4HR PRN for PAIN-SEE DOSE 

INSTRUCTIONS


   Prescribed by: CANDIS WONG on 1/25/23 0945


Pantoprazole Sodium (Pantoprazole Sodium) 40 Mg Tablet.dr, 40 MG PO DAILY, 

(Reported)


   Entered as Reported by: DAYLIN JAY on 1/19/23 1257


Sertraline HCl (Sertraline HCl) 50 Mg Tablet, 50 MG PO DAILY, (Reported)


   Entered as Reported by: DAYLIN JAY on 1/19/23 1257


Thiamine HCl (Vitamin B-1) 100 Mg Tablet, 100 MG PO DAILY@0700


   Prescribed by: CANDIS WONG on 1/25/23 0944





Review of Systems


Review of Systems


Constitutional:  see HPI


EENTM:  See HPI


Respiratory:  See HPI


Cardiovascular:  See HPI


Gastrointestinal:  See HPI


Genitourinary:  See HPI


Musculoskeletal:  see HPI


Psychiatric/Neurological:  See HPI


Endocrine:  See HPI


Hematologic/Lymphatic:  See HPI





All Other Systems Reviewed


Negative Unless Noted:  No





Past Medical-Social-Family Hx


Patient Social History


Tobacco Use?:  Yes


Tobacco type used:  Cigarettes


Smoking Status:  Current Everyday Smoker


Substance use?:  Unable to obtain


Alcohol Use?:  Unable to obtain


Pt feels they are or have been:  Unable to obtain





Immunizations Up To Date


First/Initial COVID19 Vaccinat:  7/27/21


Second COVID19 Vaccination Jeyson:  7/27/21


Third COVID19 Vaccination Date:  7/27/21





Past Medical History


Surgery/Hospitalization HX:  


COPD, Stroke, CAD, Peripheral vascular disease with stents, Carotidstenosis with




stenting, CHF, hypertension, hyperlipidemia, tobacco abuse


Surgeries:  Yes


Coronary Stent, Vascular Surgery


Pneumonia, COPD


High Cholesterol, Hypertension


Stroke





Physical Exam


Vital Signs


Capillary Refill :


Height, Weight, BMI


Height: '"


Weight: lbs. oz. kg; 20.59 BMI


Method:


General Appearance:  No Apparent Distress, WD/WN, Other (Unresponsive, intubated

without sedation, manual ventilation via bag-valve-mask.)


HEENT:  Other (Pupils sluggishly reactive, endotracheal tube in place 21 at the 

lip.)


Neck:  Non Tender, Supple


Respiratory:  Decreased Breath Sounds, Rhonci, Other (Tachypneic, overbreathing 

vent)


Cardiovascular:  Extra Beats, Tachycardia


Gastrointestinal:  Soft; No Distended


Neurologic/Psychiatric:  Other (Unresponsive)





Focused Exam


Sepsis Stage:  Ruled Out


Lactate Level


2/3/23 08:18: Lactic Acid Level 8.10*H





Lactic Acid Level





Laboratory Tests








Test


 2/3/23


08:18


 


Lactic Acid Level


 8.10 MMOL/L


(0.50-2.00)  *H











Procedures/Interventions


Date of ETT Placement:  Feb 3, 2023


Time of ETT Placement:  1814





Progress/Results/Core Measures


Results/Orders


Lab Results





Laboratory Tests








Test


 2/3/23


08:18 2/3/23


08:20 Range/Units


 


 


White Blood Count


 16.0 H


 


 4.3-11.0


10^3/uL


 


Red Blood Count


 3.80 L


 


 4.30-5.52


10^6/uL


 


Hemoglobin 12.6 L  13.3-17.7  g/dL


 


Hematocrit 38 L  40-54  %


 


Mean Corpuscular Volume 100 H  80-99  fL


 


Mean Corpuscular Hemoglobin 33   25-34  pg


 


Mean Corpuscular Hemoglobin


Concent 33 


 


 32-36  g/dL





 


Red Cell Distribution Width 12.6   10.0-14.5  %


 


Platelet Count


 193 


 


 130-400


10^3/uL


 


Mean Platelet Volume 10.0   9.0-12.2  fL


 


Immature Granulocyte % (Auto) 2    %


 


Neutrophils (%) (Auto) 79 H  42-75  %


 


Lymphocytes (%) (Auto) 10 L  12-44  %


 


Monocytes (%) (Auto) 9   0-12  %


 


Eosinophils (%) (Auto) 0   0-10  %


 


Basophils (%) (Auto) 0   0-10  %


 


Neutrophils # (Auto)


 12.6 H


 


 1.8-7.8


10^3/uL


 


Lymphocytes # (Auto)


 1.6 


 


 1.0-4.0


10^3/uL


 


Monocytes # (Auto)


 1.5 H


 


 0.0-1.0


10^3/uL


 


Eosinophils # (Auto)


 0.0 


 


 0.0-0.3


10^3/uL


 


Basophils # (Auto)


 0.1 


 


 0.0-0.1


10^3/uL


 


Immature Granulocyte # (Auto)


 0.2 H


 


 0.0-0.1


10^3/uL


 


Blood Gas Puncture Site L RADIAL    


 


Blood Gas Patient Temperature NA    


 


Arterial Blood pH 7.05 *L  7.37-7.43  


 


Arterial Blood Partial


Pressure CO2 67 H


 


 35-45  MMHG





 


Arterial Blood Partial


Pressure O2 71 L


 


 79-93  MMHG





 


Arterial Blood HCO3 19 L  23-27  MMOL/L


 


Arterial Blood Total CO2


 20.6 L


 


 21.0-31.0


MMOL/L


 


Arterial Blood Oxygen


Saturation 84 L


 


   %





 


Arterial Blood Base Excess


 -12.6 L


 


 -2.5-2.5


MMOL/L


 


Hansel Test NA    


 


Blood Gas Ventilator Setting YES    


 


Blood Gas Inspired Oxygen NA    


 


Lactic Acid Level


 8.10 *H


 


 0.50-2.00


MMOL/L


 


Troponin I 1.23 *H  <0.30  NG/ML








My Orders





Orders - GALO MONTOYA DO


Cbc With Automated Diff (2/3/23 08:11)


Comprehensive Metabolic Panel (2/3/23 08:11)


Troponin I Fs (2/3/23 08:11)


Ekg Tracing (2/3/23 08:11)


Chest 1 View Ap/Pa Only (2/3/23 08:11)


Lactic Acid Analyzer (2/3/23 08:11)


Arterial Blood Gas (2/3/23 08:11)


Urinalysis (2/3/23 08:11)


Pizano Cath (2/3/23 08:11)


Ns Iv 1000 Ml (Sodium Chloride 0.9%) (2/3/23 08:15)


Probnp Fs (2/3/23 08:11)


Magnesium (2/3/23 08:11)


Midazolam Injection (Versed Injection) (2/3/23 08:15)


Midazolam Injection (Versed Injection) (2/3/23 08:17)


Manual Differential (2/3/23 08:18)


Blood Culture (2/3/23 08:39)


Ekg Tracing (2/3/23 08:43)








Departure


Communication (Admissions)


Chest x-ray: Endotracheal tube in proper place.,  Pulmonary vascular congestion.


EKG 1: A. fib


EKG 2: Normal sinus rhythm, no acute ST segment elevation.





Patient with witnessed cardiac/respiratory arrest, with spontaneous return of 

circulation after infield CPR.  The Apparent cause of patient's respiratory 

arrest is unknown as this is the second episode within 1 month.  Patient is 

susceptible to underlying arrhythmia with pulmonary edema secondary to his COPD,

and has history of known stroke.  Lungs are compliant with an easily ventilated 

on mechanical ventilator. Patient is requiring minimal sedation to prevent 

overbreathing the vent.  Pupils are sluggishly reactive reactive.  Empiric 

antibiotics given due to elevation of white blood cell count which is thought to

be likely stress response.  Troponin is elevated thought likely to be related to

cardiac arrest.  Initial EKG shows A. fib.  Repeat EKG shows normal sinus rhythm

with no evidence of ST segment elevation lactic acid is likely due to prolonged 

hypoxic state.  Sepsis considered but felt unlikely due t clinical presentation 

presentation.  Blood cultures, COVID and flu swabs were obtained.  Empiric 

antibiotics will be given.  The patient remains in critical condition with 

stable vital signs on ventilator.  Dr. Harris accepts to [facility]\ 

ICU





Impression





   Primary Impression:  


   Respiratory arrest


   Additional Impressions:  


   Cardiac arrest


   Congestive heart disease


   COPD exacerbation


   Dependent on ventilator


Disposition:  09 ADMITTED AS INPATIENT


Condition:  Critical





Admissions


Decision to Admit/Date:  Feb 3, 2023


Time/Decision to Admit Time:  08:30





Transfer


Method of Transfer:  EMS





Departure-Patient Inst.


Referrals:  


STEVE VASQUEZ (PCP)


Primary Care Physician








[facility] (Family)


Primary Care Physician











GALO MONTOYA DO                    Feb 3, 2023 08:50

## 2023-02-03 NOTE — HISTORY & PHYSICAL
HPI


History of Present Illness:


Information obtained from ER physician and family at bedside. His wife states 

that as of yesterday he was walking around "showing off" his mobility after 

having recent peripheral vascular stenting in his right leg which had been numb.

This morning, he called her name loudly, and then simply said "oxygen", and she 

was going to get a breathing treatment, but he rapidly appeared as if he would 

pass out and then lost consciousness, she called his daughter who lives very 

close and arrived quickly and called EMS. EMS performed CPR and delivered 

defibrillation for v tach, he was pulseless for at least 10 minutes, but they 

did recover a pulse ultimately. In the ER he reportedly appeared to be in a fib 

for some time, but then sinus again and had no ST elevation. 





His family reports he has a history of COPD, coronary artery disease (had 

stenting done in 2022), peripheral artery disease (stenting done in 2023 in [place]), hypertension and hyperlipidemia. He had a stroke about 7-8 

years ago and has residual right sided weakness. He quit smoking since his last 

admission a couple of weeks ago and is using nicotine patches, last one placed 

last night. He does have a history of heavy alcohol use up to 15 beers per day, 

until after his last hospitalization and since then he has had only a couple of 

beers per day. He does not use supplemental oxygen at home, his daughter notes 

they tested him at clinic and he did not qualify. He does not use CPAP. He was 

recently admitted with respiratory failure requiring intubation a couple of 

weeks ago, but had recovered very well.


Source:  family


Exam Limitations:  clinical condition


Date seen by provider:  Feb 3, 2023


Time Seen by Provider:  12:30


Attending Physician


Lilliam Little


PCP


Admitting Physician:


Umair Rutledge MD 








Attending Physician:


Umair Rutledge MD


Consult





Date of Admission


Feb 3, 2023 at 10:02





Home Medications


Home Medications


Reviewed patient Home Medication Reconciliation performed by pharmacy medication

reconciliations technician and/or nursing.


Patients Allergies have been reviewed.





Allergies


Coded Allergies:  


     No Known Drug Allergies (Unverified , 22)





PMH-Social-Family Hx


Patient Social History


Smoking Status:  Current Everyday Smoker


Alcohol Use?:  Unable to obtain


Tobacco type used:  Cigarettes


Have you traveled recently?:  No





Immunizations Up To Date


Influenza Vaccine Up-to-Date:  No; Not Current


First/Initial COVID19 Vaccinat:  21


Second COVID19 Vaccination Jeyson:  21


Third COVID19 Vaccination Date:  21





Past Medical History





PMHx:


COPD


HTN


CAD


HLD





SurgHx:


Back surgery


Left arm fracture repair


Cardiac stenting


Peripheral artery stenting





Family Medical History


Significant Family History:  Other Conditions/Hx (unable to obtain)





Review of Systems ([facility])


Constitutional:  other (unable to obtain)





Reviewed Test Results


Reviewed Test Results


Lab





Laboratory Tests








Test


 2/3/23


08:18 2/3/23


08:20 2/3/23


08:43 2/3/23


10:39 Range/Units


 


 


White Blood Count


 16.0 H


 


 


 


 4.3-11.0


10^3/uL


 


Red Blood Count


 3.80 L


 


 


 


 4.30-5.52


10^6/uL


 


Hemoglobin 12.6 L    13.3-17.7  g/dL


 


Hematocrit 38 L    40-54  %


 


Mean Corpuscular Volume 100 H    80-99  fL


 


Mean Corpuscular Hemoglobin 33     25-34  pg


 


Mean Corpuscular Hemoglobin


Concent 33 


 


 


 


 32-36  g/dL





 


Red Cell Distribution Width 12.6     10.0-14.5  %


 


Platelet Count


 193 


 


 


 


 130-400


10^3/uL


 


Mean Platelet Volume 10.0     9.0-12.2  fL


 


Immature Granulocyte % (Auto) 2      %


 


Neutrophils (%) (Auto) 79 H    42-75  %


 


Lymphocytes (%) (Auto) 10 L    12-44  %


 


Monocytes (%) (Auto) 9     0-12  %


 


Eosinophils (%) (Auto) 0     0-10  %


 


Basophils (%) (Auto) 0     0-10  %


 


Neutrophils # (Auto)


 12.6 H


 


 


 


 1.8-7.8


10^3/uL


 


Lymphocytes # (Auto)


 1.6 


 


 


 


 1.0-4.0


10^3/uL


 


Monocytes # (Auto)


 1.5 H


 


 


 


 0.0-1.0


10^3/uL


 


Eosinophils # (Auto)


 0.0 


 


 


 


 0.0-0.3


10^3/uL


 


Basophils # (Auto)


 0.1 


 


 


 


 0.0-0.1


10^3/uL


 


Immature Granulocyte # (Auto)


 0.2 H


 


 


 


 0.0-0.1


10^3/uL


 


Neutrophils % (Manual) 71      %


 


Lymphocytes % (Manual) 9      %


 


Monocytes % (Manual) 7      %


 


Myelocytes % 2      %


 


Band Neutrophils 11      %


 


Platelet Estimate NORMAL      


 


Macrocytosis SLIGHT      


 


Blood Gas Puncture Site L RADIAL    LEFT RAD   


 


Blood Gas Patient Temperature NA    36.1   


 


Arterial Blood pH 7.05 *L   7.17 *L 7.37-7.43  


 


Arterial Blood Partial


Pressure CO2 67 H


 


 


 64 H


 35-45  MMHG





 


Arterial Blood Partial


Pressure O2 71 L


 


 


 64 L


 79-93  MMHG





 


Arterial Blood HCO3 19 L   23  23-27  MMOL/L


 


Arterial Blood Total CO2


 20.6 L


 


 


 24.9 


 21.0-31.0


MMOL/L


 


Arterial Blood Oxygen


Saturation 84 L


 


 


 87 L


   %





 


Arterial Blood Base Excess


 -12.6 L


 


 


 -4.8 L


 -2.5-2.5


MMOL/L


 


Hansel Test NA    YES-POS   


 


Blood Gas Ventilator Setting YES    YES   


 


Blood Gas Inspired Oxygen NA    20   


 


Sodium Level 140     135-145  MMOL/L


 


Potassium Level 3.6     3.6-5.0  MMOL/L


 


Chloride Level 102       MMOL/L


 


Carbon Dioxide Level 21     21-32  MMOL/L


 


Anion Gap 17 H    5-14  MMOL/L


 


Blood Urea Nitrogen 5 L    7-18  MG/DL


 


Creatinine


 0.81 


 


 


 


 0.60-1.30


MG/DL


 


Estimat Glomerular Filtration


Rate 100 


 


 


 


  





 


BUN/Creatinine Ratio 6      


 


Glucose Level 213 H      MG/DL


 


Lactic Acid Level


 8.10 *H


 


 


 


 0.50-2.00


MMOL/L


 


Calcium Level 8.1 L    8.5-10.1  MG/DL


 


Corrected Calcium 8.9     8.5-10.1  MG/DL


 


Magnesium Level 2.0     1.6-2.4  MG/DL


 


Total Bilirubin 0.4     0.1-1.0  MG/DL


 


Aspartate Amino Transf


(AST/SGOT) 23 


 


 


 


 5-34  U/L





 


Alanine Aminotransferase


(ALT/SGPT) 16 


 


 


 


 0-55  U/L





 


Alkaline Phosphatase 79       U/L


 


Troponin I 1.23 *H    <0.30  NG/ML


 


Pro-B-Type Natriuretic Peptide 5515.0 H    <125.0  PG/ML


 


Total Protein 5.3 L    6.4-8.2  GM/DL


 


Albumin 3.0 L    3.2-4.5  GM/DL


 


Urine Color  YELLOW     


 


Urine Clarity  TURBID     


 


Urine pH  6.5    5-9  


 


Urine Specific Gravity  >=1.030    1.016-1.022  


 


Urine Protein  2+ H   NEGATIVE  


 


Urine Glucose (UA)  1+ H   NEGATIVE  


 


Urine Ketones  NEGATIVE    NEGATIVE  


 


Urine Nitrite  NEGATIVE    NEGATIVE  


 


Urine Bilirubin  NEGATIVE    NEGATIVE  


 


Urine Urobilinogen  0.2    < = 1.0  MG/DL


 


Urine Leukocyte Esterase  NEGATIVE    NEGATIVE  


 


Urine RBC (Auto)  1+ H   NEGATIVE  


 


Urine RBC  2-5 H    /HPF


 


Urine WBC  10-25 H    /HPF


 


Urine Squamous Epithelial


Cells 


 NONE 


 


 


  /HPF





 


Urine Crystals  PRESENT H    /LPF


 


Urine Amorphous Sediment


 


 FEW JOSAFAT


URATES H 


 


  /LPF





 


Urine Bacteria  FEW H    /HPF


 


Urine Casts  PRESENT     /LPF


 


Urine Hyaline Casts  >50 H    /LPF


 


Urine Granular Casts  10-25 H    /LPF


 


Urine Mucus  MODERATE H    /LPF


 


Urine Culture Indicated  YES     


 


Influenza Type A (RT-PCR)   Not Detected   Not Detecte  


 


Influenza Type B (RT-PCR)   Not Detected   Not Detecte  


 


SARS-CoV-2 RNA (RT-PCR)   Not Detected   Not Detecte  


 


Test


 2/3/23


10:54 2/3/23


13:05 


 


 Range/Units


 


 


Lactic Acid Level


 6.78 *H


 


 


 


 0.50-2.00


MMOL/L


 


Troponin I 4.482 *H    <0.028  NG/ML








Radiology


NAME:   JAS ROCK


MED REC#:   [id number]


ACCOUNT#:   [id number]


PT STATUS:   REG ER


:   1960


PHYSICIAN:   GALO MONTOAY DO


ADMIT DATE:   23/ER FS


***Signed***


Date of Exam:23





CHEST 1 VIEW AP/PA ONLY








INDICATION: Respiratory distress





Portable chest 8:27 AM





There is an ET tube projecting over the trachea. NG tube appears


to enter the stomach. There are faint alveolar infiltrates in


both lungs. There is interstitial prominence. There are no


effusions or pneumothoraces.





IMPRESSION: Improving aeration of the lungs compared to


2023. There continues to be some faint groundglass


infiltrate and interstitial prominence in the lungs.





Dictated by: 





  Dictated on workstation # [id number]








Dict:   [id number]


Trans:   [id number]


AN [id number]





Interpreted by:     PARAS ZAPATA MD


Electronically signed by: PARAS ZAPATA MD [id number]





Physical Exam-([facility])


Physical Exam


Vital Signs





                          VS - Last 72 Hours, by Label








 2/3/23 2/3/23 2/3/23 2/3/23





 08:05 08:05 09:15 10:30


 


Temp 35.1   35.9


 


Pulse 123  87 96


 


Resp 22  19 22


 


B/P (MAP) 89/51 (64)  165/64 105/74 (84)


 


Pulse Ox 96  100 100


 


O2 Delivery Mechanical Ventilator Mechanical Ventilator Room Air Mechanical 

Ventilator


 


O2 Flow Rate  40.00  88.00


 


    





 2/3/23 2/3/23 2/3/23 2/3/23





 10:49 11:00 12:00 12:57


 


Temp   33.0 


 


Pulse 96 96  87


 


Resp  24  


 


B/P (MAP)  137/111 (123)  


 


Pulse Ox  100  


 


O2 Delivery  Mechanical Ventilator  


 


O2 Flow Rate  88.00  





Capillary Refill : Less Than 3 Seconds


General Appearance:  other (intubated, somnolent (not on sedation at time of 

exam))


Respiratory:  normal breath sounds, accessory muscle use


Cardiovascular:  tachycardia


Peripheral Pulses:  2+ Radial Pulses (R), 2+ Radial Pulses (L)


Gastrointestinal:  normal bowel sounds, soft


Extremities:  no pedal edema


Neurologic/Psychiatric:  other (is not on sedation at time of exam- does not 

respond to voice, does withdraw hand from pain, having shuddering possible 

seizure like movements occasionally of entire left side that start at face and 

go down to foot)


Skin:  cool





Assessment/Plan


Assessment/Plan


Admission Status:  Inpatient Order (span 2 midnights)


Reason for Inpatient Admission:  


Cardiac arrest





(1) Respiratory arrest


Status:  Acute


Assessment & Plan:  Unclear based on history whether respiratory or cardiac prec

ipitator, regardless, is currently intubated, and repeat ABG shows slightly 

improved pH and hypercapnia. Appreciate [facility] ICU ventilator management.





(2) Cardiac arrest


Status:  Acute


Assessment & Plan:  s/p resuscitation in the field by EMS. Is breathing 

spontaneously and withdraws from pain, no other clear responsiveness at this 

time. [facility]osin ICU physician following, appreciate recommendations.





(3) Respiratory acidosis


Status:  Acute


(4) Lactic acid acidosis


Status:  Acute


Assessment & Plan:  Uncertain if this represents septic shock, suspect it is 

related to tissue hypoxia from arrest rather than infection. Trended down on 

recheck. Started on cefepime for possible underlying infection given elevated 

WBC and severity of presentation, but no clear source of infection yet noted. 

Received IV bolus in ER. History of CHF and CXR with some findings of edema, 

fluid balance may be challenging.





(5) Troponin level elevated


Status:  Acute


(6) Mixed hyperlipidemia


Status:  Chronic


(7) Primary hypertension


Status:  Chronic


(8) History of cerebrovascular accident


Status:  Chronic


Assessment & Plan:  With right sided weakness at baseline





(9) Coronary artery disease


Status:  Chronic


Qualifiers:  


   


(10) Peripheral vascular disease


Status:  Chronic


(11) Heavy alcohol use


Status:  Chronic


Assessment & Plan:  Consider contribution of alcohol withdrawal, particularly as

time progresses, however, per family has been drinking significantly less for at

least a couple of weeks. Monitor closely.





(12) COPD (chronic obstructive pulmonary disease)


Status:  Chronic


Assessment & Plan:  IV solumedrol for possible COPD exacerbation contribution to

respiratory failure.





(13) DVT prophylaxis


Status:  Acute


Assessment & Plan:  Enoxaparin





(14) Goals of care, counseling/discussion


Status:  Acute


Assessment & Plan:  Family at bedside note they do not believe he would want to 

be maintained on ventilator for a long time if neurologic recovery is unlikely. 

Discussed gravity of current situation and as of yet undetermined neurologic 

status. Discussed if he had recurrent cardiac/respiratory arrest, outcome would 

be likely further deteriorated, and they express understanding. At this time 

remains full code while further evaluation progresses.














UMAIR RUTLEDGE MD              Feb 3, 2023 13:06

## 2023-02-03 NOTE — CONSULTATION - SURGERY
History of Present Illness


History of Present Illness


Patient Consulted On(elroy/time)


2/3/23


 17:58


Time Seen by Provider:  17:31


History of Present Illness


Surgery asked to consult regarding Hypotension and Venous insufficiency.





HPI per ED:  Patient is a 62-year-old male with history of COPD with recent 8-

day hospitalization at [facility] for respiratory failure requiring 

ventilation who presents status post witnessed cardiac arrest.  Patient was 

downstairs when he yelled for breath stating he was short of breath.  The 

patient does not require oxygen at home and does not wear CPAP at night.  The 

patient then attempted to stand and collapsed.  He was unresponsive and upon 

family members arrival was apneic and pulseless.  Chest compressions were 

started and on EMS arrival, the patient was apneic, pulseless and found to be in

V. fib and was defibrillated once.  The patient had approximately 10 minutes of 

chest compressions, received multiple doses of bicarb and epinephrine prior to 

return of his spontaneous circulation.  An IO was placed and the patient was 

intubated without sedation.  History is limited due to the patient's clinical 

condition.





When I saw pt he was intubated and sedated, no family at bedside.  All info 

obtained from chart.  I did talk to nurse who stated they could not keep his BP 

and started him on Levo, needs central access.





Allergies and Home Medications


Allergies


Coded Allergies:  


     No Known Drug Allergies (Unverified , 5/30/22)





Patient Home Medication List


Home Medication List Reviewed:  Yes


Amlodipine Besylate (Amlodipine Besylate) 5 Mg Tablet, 5 MG PO DAILY


   Prescribed by: CANDIS WONG on 1/25/23 0943


   Last Action: Reviewed


Aspirin (Aspirin EC) 81 Mg Tablet.dr, 81 MG PO DAILY, (Reported)


   Entered as Reported by: DAYLIN JAY on 1/19/23 1257


   Last Action: Reviewed


Atorvastatin Calcium (Atorvastatin Calcium) 20 Mg Tablet, 20 MG PO DAILY, 

(Reported)


   Entered as Reported by: DAYLIN JAY on 1/19/23 1257


   Last Action: Reviewed


Clopidogrel Bisulfate (Clopidogrel) 75 Mg Tablet, 75 MG PO DAILY, (Reported)


   Entered as Reported by: DAYLIN JAY on 1/19/23 1257


   Last Action: Reviewed


Folic Acid (Folic Acid) 1 Mg Tablet, 1 MG PO DAILY


   Prescribed by: CANDIS WONG on 1/25/23 0944


   Last Action: Reviewed


Ipratropium/Albuterol Sulfate (Iprat-Albut 0.5-3(2.5) mg/3 ml) 0.5 Mg-3 Mg (2.5 

Mg Base)/3 Ml Ampul.neb, 3 ML IH Q6H PRN for SHORTNESS OF BREATH, (Reported)


   Entered as Reported by: DAYLIN JAY on 1/19/23 1257


   Last Action: Reviewed


Metoprolol Succinate (Metoprolol Succinate) 50 Mg Tab.er.24h, 50 MG PO DAILY, 

(Reported)


   Entered as Reported by: DAYLIN JAY on 1/19/23 1257


   Last Action: Reviewed


Oxycodone Hcl (Oxyir Tablet) 5 Mg Tab, 5 MG PO Q4HR PRN for PAIN-SEE DOSE 

INSTRUCTIONS


   Prescribed by: CANDIS WONG on 1/25/23 0945


   Last Action: Reviewed


Pantoprazole Sodium (Pantoprazole Sodium) 40 Mg Tablet.dr, 40 MG PO DAILY, 

(Reported)


   Entered as Reported by: DAYLIN JAY on 1/19/23 1257


   Last Action: Reviewed


Sertraline HCl (Sertraline HCl) 50 Mg Tablet, 50 MG PO DAILY, (Reported)


   Entered as Reported by: DAYLIN JAY on 1/19/23 1257


   Last Action: Reviewed


Thiamine HCl (Vitamin B-1) 100 Mg Tablet, 100 MG PO DAILY@0700


   Prescribed by: CANDIS WONG on 1/25/23 0944


   Last Action: Reviewed





Past Medical-Social-Family Hx


Patient Social History


Smoking Status:  Current Everyday Smoker


Alcohol Use?:  Unable to obtain


Have you traveled recently?:  No





Surgeries


History of Surgeries:  Yes


Surgeries:  Coronary Stent, Vascular Surgery





Respiratory


Respiratory Disorders:  Pneumonia, COPD





Cardiovascular


Cardiac Disorders:  High Cholesterol, Hypertension





Neurological


Neurological Disorders:  Stroke





Family Medical History


Significant Family History:  Other Conditions/Hx (unable to obtain)





Review of Systems-General


ROS-Unable to Obtain:  pt sedated and intubated





Physical Exam-General Problems


Physical Exam


Vital Signs





Vital Signs - First Documented








 2/3/23 2/3/23





 08:05 10:00


 


Temp 35.1 


 


Pulse 123 


 


Resp 22 


 


B/P (MAP) 89/51 (64) 


 


Pulse Ox 96 


 


O2 Delivery Mechanical Ventilator 


 


O2 Flow Rate 40.00 


 


FiO2  88





Capillary Refill : Less Than 3 Seconds


General Appearance:  other (sedated and inbutated)


Respiratory:  lungs clear, no respiratory distress, no accessory muscle use


Cardiovascular:  no gallop, tachycardia


Gastrointestinal:  soft, no organomegaly


Extremities:  no pedal edema


Neurologic/Psychiatric:  other (intubated and sedated)


Lymphatic:  no adenopathy (neck or axilla)





Data Review


Labs


Laboratory Tests


2/3/23 08:18: 


White Blood Count 16.0H, Red Blood Count 3.80L, Hemoglobin 12.6L, Hematocrit 38L

, Mean Corpuscular Volume 100H, Mean Corpuscular Hemoglobin 33, Mean Corpuscular

Hemoglobin Concent 33, Red Cell Distribution Width 12.6, Platelet Count 193, 

Mean Platelet Volume 10.0, Immature Granulocyte % (Auto) 2, Neutrophils (%) 

(Auto) 79H, Lymphocytes (%) (Auto) 10L, Monocytes (%) (Auto) 9, Eosinophils (%) 

(Auto) 0, Basophils (%) (Auto) 0, Neutrophils # (Auto) 12.6H, Lymphocytes # 

(Auto) 1.6, Monocytes # (Auto) 1.5H, Eosinophils # (Auto) 0.0, Basophils # 

(Auto) 0.1, Immature Granulocyte # (Auto) 0.2H, Neutrophils % (Manual) 71, 

Lymphocytes % (Manual) 9, Monocytes % (Manual) 7, Myelocytes % 2, Band 

Neutrophils 11, Platelet Estimate NORMAL, Macrocytosis SLIGHT, Blood Gas 

Puncture Site L RADIAL, Blood Gas Patient Temperature NA, Arterial Blood pH 

7.05*L, Arterial Blood Partial Pressure CO2 67H, Arterial Blood Partial Pressure

O2 71L, Arterial Blood HCO3 19L, Arterial Blood Total CO2 20.6L, Arterial Blood 

Oxygen Saturation 84L, Arterial Blood Base Excess -12.6L, Hansel Test NA, Blood 

Gas Ventilator Setting YES, Blood Gas Inspired Oxygen NA, Sodium Level 140, 

Potassium Level 3.6, Chloride Level 102, Carbon Dioxide Level 21, Anion Gap 17H,

Blood Urea Nitrogen 5L, Creatinine 0.81, Estimat Glomerular Filtration Rate 100,

BUN/Creatinine Ratio 6, Glucose Level 213H, Lactic Acid Level 8.10*H, Calcium 

Level 8.1L, Corrected Calcium 8.9, Magnesium Level 2.0, Total Bilirubin 0.4, 

Aspartate Amino Transf (AST/SGOT) 23, Alanine Aminotransferase (ALT/SGPT) 16, A

lkaline Phosphatase 79, Troponin I 1.23*H, Pro-B-Type Natriuretic Peptide 

5515.0H, Total Protein 5.3L, Albumin 3.0L


2/3/23 08:20: 


Urine Color YELLOW, Urine Clarity TURBID, Urine pH 6.5, Urine Specific Gravity 

>=1.030, Urine Protein 2+H, Urine Glucose (UA) 1+H, Urine Ketones NEGATIVE, 

Urine Nitrite NEGATIVE, Urine Bilirubin NEGATIVE, Urine Urobilinogen 0.2, Urine 

Leukocyte Esterase NEGATIVE, Urine RBC (Auto) 1+H, Urine RBC 2-5H, Urine WBC 10-

25H, Urine Squamous Epithelial Cells NONE, Urine Crystals PRESENTH, Urine 

Amorphous Sediment FEW JOSAFAT URATESH, Urine Bacteria FEWH, Urine Casts PRESENT, 

Urine Hyaline Casts >50H, Urine Granular Casts 10-25H, Urine Mucus MODERATEH, 

Urine Culture Indicated YES


2/3/23 08:43: 


Influenza Type A (RT-PCR) Not Detected, Influenza Type B (RT-PCR) Not Detected, 

SARS-CoV-2 RNA (RT-PCR) Not Detected


2/3/23 10:39: 


Blood Gas Puncture Site LEFT RAD, Blood Gas Patient Temperature 36.1, Arterial 

Blood pH 7.17*L, Arterial Blood Partial Pressure CO2 64H, Arterial Blood Partial

Pressure O2 64L, Arterial Blood HCO3 23, Arterial Blood Total CO2 24.9, Arterial

Blood Oxygen Saturation 87L, Arterial Blood Base Excess -4.8L, Hansel Test YES-

POS, Blood Gas Ventilator Setting YES, Blood Gas Inspired Oxygen 20


2/3/23 10:54: 


Lactic Acid Level 6.78*H, Troponin I 4.482*H


2/3/23 14:07: 


White Blood Count 16.4H, Red Blood Count 4.16L, Hemoglobin 13.6, Hematocrit 41, 

Mean Corpuscular Volume 98, Mean Corpuscular Hemoglobin 33, Mean Corpuscular 

Hemoglobin Concent 34, Red Cell Distribution Width 12.5, Platelet Count 178, 

Mean Platelet Volume 9.8, Sodium Level 140, Potassium Level 3.6, Chloride Level 

102, Carbon Dioxide Level 22, Anion Gap 16H, Blood Urea Nitrogen 7, Creatinine 

0.78, Estimat Glomerular Filtration Rate 101, BUN/Creatinine Ratio 9, Glucose 

Level 154H, Calcium Level 8.2L, Magnesium Level 1.6, Triglycerides Level 85


2/3/23 16:09: Glucometer 165H


2/3/23 16:30: 


Blood Gas Puncture Site LEFT RAD, Blood Gas Patient Temperature 36.4, Arterial 

Blood pH 7.34*L, Arterial Blood Partial Pressure CO2 46H, Arterial Blood Partial

Pressure O2 33*L, Arterial Blood HCO3 24, Arterial Blood Total CO2 25.9, 

Arterial Blood Oxygen Saturation 58L, Arterial Blood Base Excess -0.7, Hansel 

Test YES-POS, Blood Gas Ventilator Setting YES, Blood Gas Inspired Oxygen 60%





Assessment/Plan


Hypotension


Venous Insufficiency


S/P Cardiac and Respiratory arrest








Plan to place central line for pressor support.  Consent obtained from Daughter.











GURPREET GONZALEZ PHUONG DO                Feb 3, 2023 18:02

## 2023-02-03 NOTE — TELE-ICU CONSULT
History of Present Illness


History of Present Illness


Date Seen by Provider:  Feb 3, 2023


Time Seen by Provider:  10:37


Date of Admission





History of Present Illness





(Tele-ICU Physician ,   consultation as per request of PCP 


Service provided via interactive audio and video telecommunHumble Bundle  E-CARE 

system to a patient admitted to ICU bed in [facility].








Available chart/ vitals / labs / Images reviewed


H&P is from ER notes


Patient's information available about PMH, Shx, Fhx   allergy reviewed inEMR.


ROS as per chart and RN report





Now in ICU, hemodynamically stable


Video assessment done using   teleICU camera, rest of exam as per RN


Discussed with RN.





VENT SETTINGS and ABG   reviewed


NOT CANDIDATE   for SBTreviewed  possible contraindications including 

Cardiovascular Stability /Sedation Score / FI02/PEEP / ABG / CXR/ secretions 





Sedation, discussed with RN,   RASS  on 








Consultants:


Hospital course:


RECENT 1/17/23 ICU stay with intubation for AECOP/PNA 2 days , extuv=bated --> 

puiln edema , VT 


2/3/23 - status post witnessed cardiac arrest at home , Intubated 








A/P


status post witnessed cardiac arrest at home - ASLC 10 min till ROSC


- V. fib and was defibrillated once by paramedics - in afib in ER 


- repeat EKG 


-- ECHO 1/20/23 EF 65% , gr II dst dsf 


- as per cards





Encephalopathy  post arrest 


- received verced for transfer


- not follow exams - will hold any sedation for now - re-eval if improving 


- CTH if no improvement 





Acute resp failure ,


- Intubated 2/3/23 with arrest 


- cxr checked -  abg pending 








 leukocytosis


- probably reactive  NEG flu , covid) 


-( recent PNA  CAP ,  RML - cxr  IMPROVED, almost resolved 


- abx initiated  in ER empirically , sputum cx - follow 





AECOPD?


- nebs 


 -peak airway  pressures are low , most likely does not need high dose of 

steroids -  as per PCP based on exam 





CAD


- s/p stenting  in past 


-EF reported 65% 2023








Pulm HTN 


- ECHO  RVSP 50 mmGg, received 2 L LR in ER - vitals stable 


-decrease  IVF  ( elev lactate is post arrest , does not need sepsis boluses ) 








Nutrition 


- TF to start tomorrow





S/p CVA 








Lines :      , (Central Line Necessity Reviewed)


Pizano: +


OG:


Nutrition:


Analgesia:


Anxiety/ delirium








VTE Prophylaxis: lov 


Stress Ulcer Prophylaxis: ppi 











Plans in collaboration with   bedside consultants and IM MDs.


Discussed with RN to reach out if any questions or concerns


A total of 33 minutes of critical care time was devoted to this patient today, 

required to treat and/or prevent further deterioration of  critical care 

condition ( as above ) .





I am remotely monitoring this patient from another state.  I am unable to do the

bedside exam, and history/physical and pertinent information is taken from other

notes in the computer and bedside staff. .





Allergies and Home Medications


Allergies


Coded Allergies:  


     No Known Drug Allergies (Unverified , 5/30/22)





Home Medications


Amlodipine Besylate 5 Mg Tablet, 5 MG PO DAILY


   Prescribed by: CANDIS WONG on 1/25/23 0943


Aspirin 81 Mg Tablet.dr, 81 MG PO DAILY, (Reported)


Atorvastatin Calcium 20 Mg Tablet, 20 MG PO DAILY, (Reported)


   LAST FILLED 10- #30/30 DAY SUPPLY 


Clopidogrel Bisulfate 75 Mg Tablet, 75 MG PO DAILY, (Reported)


Folic Acid 1 Mg Tablet, 1 MG PO DAILY


   Prescribed by: CANDIS WONG on 1/25/23 0944


Ipratropium/Albuterol Sulfate 0.5 Mg-3 Mg (2.5 Mg Base)/3 Ml Ampul.neb, 3 ML IH 

Q6H PRN for SHORTNESS OF BREATH, (Reported)


Metoprolol Succinate 50 Mg Tab.er.24h, 50 MG PO DAILY, (Reported)


Oxycodone Hcl 5 Mg Tab, 5 MG PO Q4HR PRN for PAIN-SEE DOSE INSTRUCTIONS


   Prescribed by: CANDIS WONG on 1/25/23 0945


Pantoprazole Sodium 40 Mg Tablet.dr, 40 MG PO DAILY, (Reported)


Sertraline HCl 50 Mg Tablet, 50 MG PO DAILY, (Reported)


Thiamine HCl 100 Mg Tablet, 100 MG PO DAILY@0700


   Prescribed by: CANDIS WONG on 1/25/23 0944





Past Medical/Social/Family Hx


Patient Social History


Tobacco Use?:  Yes


Tobacco type used:  Cigarettes


Smoking Status:  Current Everyday Smoker


Substance use?:  Unable to obtain


Alcohol Use?:  Unable to obtain


Pt stated abuse/neglect:  Unable to obtain





Immunizations Up To Date


First/Initial COVID19 Vaccinat:  7/27/21


Second COVID19 Vaccination Jeyson:  7/27/21


Tetanus Booster (TDap):  Unknown


Hepatitis A:  No


Hepatitis B:  No


TB Skin Test:  None





Current Status


Advance Directives:  No


Communicates:  Verbally


Primary Language:  English


Preferred Spoken Language:  English





Review of Systems


Constitutional:  see HPI





Focused Exam


Lactate Level


2/3/23 08:18: Lactic Acid Level 8.10*H


Height, Weight, BMI


Height: '"


Weight: lbs. oz. kg; 20.00 BMI


Method:


Lactic Acid Level





Laboratory Tests








Test


 2/3/23


08:18


 


Lactic Acid Level


 8.10 MMOL/L


(0.50-2.00)  *H











Exam


Exam


Patient acknowledged, consented, and participated in this virtual visit which 

was conducted using real time audio/video


Vital Signs








  Date Time  Temp Pulse Resp B/P (MAP) Pulse Ox O2 Delivery O2 Flow Rate FiO2


 


2/3/23 10:30 35.9 96 22 105/74 (84) 100 Mechanical Ventilator 88.00 


 


2/3/23 09:15  87 19 165/64 100 Room Air  


 


2/3/23 08:05      Mechanical Ventilator 40.00 


 


2/3/23 08:05 35.1 123 22 89/51 (64) 96 Mechanical Ventilator  








Height & Weight


Height: '"


Weight: lbs. oz. kg; 20.00 BMI


Method:


General Appearance:  No Apparent Distress, WD/WN, Other (Unresponsive, intubated

without sedation, manual ventilation via bag-valve-mask.)


HEENT:  Other (Pupils sluggishly reactive, endotracheal tube in place 21 at the 

lip.)


Neck:  Non Tender, Supple


Respiratory:  Decreased Breath Sounds, Rhonci, Other (Tachypneic, overbreathing 

vent)


Cardiovascular:  Extra Beats, Tachycardia


Capillary Refill:  Less Than 3 Seconds


Neurologic/Psychiatric:  Other (Unresponsive)





Results


Lab


Laboratory Tests


2/3/23 08:18











Assessment/Plan


Assessment/Plan


1











OTN MOHAMUD MD          Feb 3, 2023 10:38

## 2023-02-03 NOTE — PHYSICAL THERAPY PROGRESS NOTE
Therapy Progress Note


Patient currently intubated and sedated.  PT will monitor patient's status and 

initiate treatment when patient is medically stable and able to actively 

participate with skilled therapy.











MARICEL WILSON PT               Feb 3, 2023 10:54

## 2023-02-03 NOTE — CONSULTATION-CARDIOLOGY
HPI-Cardiology


Cardiology Consultation:


Date of Consultation


2/3/23


Time Seen by a Provider:  13:20


Date of Admission





Attending Physician


Lilliam Little


Admitting Physician


Admitting Physician:


Ria Harris MD 








Attending Physician:


Traci Wong DO


Consulting Physician


PEACE ADAN MD, MA, FACP, FACC, [facility], CCDS





Physician requesting consult: Dr Harris





HPI:


Chief Complaint:


Reason for Card consult: Cardio-resp arrest





Mr. Gamez is a 62 yr old male admitted to ICU 8 from the ED respiratory 

failure, post code on the ventilator.  He is unable to provide any information. 

Information regarding event is as per chart review and as per our conversation 

with Dr Harris who had requested this consult  Patient was downstairs when he 

yelled for breath stating he was short of breath. The patient then attempted to 

stand and collapsed.  He was unresponsive and upon family members arrival was 

apneic and pulseless.  Chest compressions were started and on EMS arrival, the 

patient was apneic, pulseless and found to be in V. fib and was defibrillated 

once.  The patient had approximately 10 minutes of chest compressions, received 

multiple doses of bicarb and epinephrine prior to return of his spontaneous 

circulation.





Review of Systems-Cardiology


Review of Systems


Constitutional:  other (pt intubated, on mech vent, with intermittent seizure 

activity)





All Other Systems Reviewed


Negative Unless Noted:  No





PMH-Social-Family Hx


Patient Social History


Smoking Status:  Current Everyday Smoker


Have you traveled recently?:  No


Alcohol Use?:  Unable to obtain


Pt feels they are or have been:  Unable to obtain


Tobacco type used:  Cigarettes





Past Medical History


PMH


As described under Assessment.





Family Medical History


Family Medical History:  


The patient does not know of any family history of premature coronary


artery disease in first-degree relatives.  (This is per chart review)





Allergies and Home Medications


Allergies


Coded Allergies:  


     No Known Drug Allergies (Unverified , 5/30/22)





Patient Home Medication List


Home Medication List Reviewed:  Yes


Amlodipine Besylate (Amlodipine Besylate) 5 Mg Tablet, 5 MG PO DAILY


   Prescribed by: TRACI WONG on 1/25/23 0943


   Last Action: Reviewed


Aspirin (Aspirin EC) 81 Mg Tablet., 81 MG PO DAILY, (Reported)


   Entered as Reported by: DAYLIN JAY on 1/19/23 1257


   Last Action: Reviewed


Atorvastatin Calcium (Atorvastatin Calcium) 20 Mg Tablet, 20 MG PO DAILY, 

(Reported)


   Entered as Reported by: DAYLIN JAY on 1/19/23 1257


   Last Action: Reviewed


Clopidogrel Bisulfate (Clopidogrel) 75 Mg Tablet, 75 MG PO DAILY, (Reported)


   Entered as Reported by: DAYLIN JAY on 1/19/23 1257


   Last Action: Reviewed


Folic Acid (Folic Acid) 1 Mg Tablet, 1 MG PO DAILY


   Prescribed by: TRACI WONG on 1/25/23 0944


   Last Action: Reviewed


Ipratropium/Albuterol Sulfate (Iprat-Albut 0.5-3(2.5) mg/3 ml) 0.5 Mg-3 Mg (2.5 

Mg Base)/3 Ml Ampul.neb, 3 ML IH Q6H PRN for SHORTNESS OF BREATH, (Reported)


   Entered as Reported by: DAYLIN JAY on 1/19/23 1257


   Last Action: Reviewed


Metoprolol Succinate (Metoprolol Succinate) 50 Mg Tab.er.24h, 50 MG PO DAILY, 

(Reported)


   Entered as Reported by: DAYLIN JAY on 1/19/23 1257


   Last Action: Reviewed


Oxycodone Hcl (Oxyir Tablet) 5 Mg Tab, 5 MG PO Q4HR PRN for PAIN-SEE DOSE 

INSTRUCTIONS


   Prescribed by: TRACI WONG on 1/25/23 0945


   Last Action: Reviewed


Pantoprazole Sodium (Pantoprazole Sodium) 40 Mg Tablet.dr, 40 MG PO DAILY, 

(Reported)


   Entered as Reported by: DAYLIN JAY on 1/19/23 1257


   Last Action: Reviewed


Sertraline HCl (Sertraline HCl) 50 Mg Tablet, 50 MG PO DAILY, (Reported)


   Entered as Reported by: DAYLIN JAY on 1/19/23 1257


   Last Action: Reviewed


Thiamine HCl (Vitamin B-1) 100 Mg Tablet, 100 MG PO DAILY@0700


   Prescribed by: TRACI WONG on 1/25/23 0944


   Last Action: Reviewed





Physical Exam-Cardiology


Physical Exam


Vital Signs/I&O











 2/3/23 2/3/23 2/3/23 2/3/23





 08:05 08:05 09:15 10:25


 


Temp 35.1   


 


Pulse 123  87 


 


Resp 22  19 


 


B/P (MAP) 89/51 (64)  165/64 


 


Pulse Ox 96  100 96


 


O2 Delivery Mechanical Ventilator Mechanical Ventilator Room Air Mechanical 

Ventilator


 


O2 Flow Rate  40.00  


 


FiO2    100


 


    





 2/3/23 2/3/23 2/3/23 2/3/23





 10:30 10:49 11:00 12:00


 


Temp 35.9   33.0


 


Pulse 96 96 96 


 


Resp 22  24 


 


B/P (MAP) 105/74 (84)  137/111 (123) 


 


Pulse Ox 100  100 


 


O2 Delivery Mechanical Ventilator  Mechanical Ventilator 


 


O2 Flow Rate 88.00  88.00 


 


    





 2/3/23 2/3/23 2/3/23 2/3/23





 12:00 12:15 12:57 13:00


 


Pulse 85  87 110


 


Resp 28   25


 


B/P (MAP) 109/45 (90)   123/82 (92)


 


Pulse Ox 100 100  100


 


O2 Delivery Mechanical Ventilator Mechanical Ventilator  Mechanical Ventilator


 


O2 Flow Rate 88.00   88.00





 2/3/23 2/3/23 2/3/23 2/3/23





 14:00 14:46 15:00 15:00


 


Pulse 90  96 96


 


Resp 25   25


 


B/P (MAP) 120/108 (112)  108/94 108/94 (99)


 


Pulse Ox 100   100


 


O2 Delivery Mechanical Ventilator Mechanical Ventilator  Mechanical Ventilator


 


O2 Flow Rate 88.00 60.00  60.00





Capillary Refill : Less Than 3 Seconds


Constitutional:  other (Intubated and sedated)


HEENT:  PERRL


Neck:  No carotid bruit; carotid pulses are 2 + bilaterally


Respiratory:  No accessory muscle use, No respiratory distress; other (coarse 

breath sounds throughout; currently on ventilator)


Cardiovascular:  regular rate-rhythm; No JVD; S1 and S2, systolic murmur


Gastrointestinal:  audible bowel sounds (NG tube in place)


Extremities:  no lower extremity edema bilateral


Neurologic/Psychiatric:  other (unable to participate in neuro exam d/t sed

ation)


Skin:  No rash on exposed areas, No ulcerations on exposed areas





Data Review


Labs


Laboratory Tests


2/3/23 08:18: 


White Blood Count 16.0H, Red Blood Count 3.80L, Hemoglobin 12.6L, Hematocrit 38L

, Mean Corpuscular Volume 100H, Mean Corpuscular Hemoglobin 33, Mean Corpuscular

Hemoglobin Concent 33, Red Cell Distribution Width 12.6, Platelet Count 193, Yady

n Platelet Volume 10.0, Immature Granulocyte % (Auto) 2, Neutrophils (%) (Auto) 

79H, Lymphocytes (%) (Auto) 10L, Monocytes (%) (Auto) 9, Eosinophils (%) (Auto) 

0, Basophils (%) (Auto) 0, Neutrophils # (Auto) 12.6H, Lymphocytes # (Auto) 1.6,

Monocytes # (Auto) 1.5H, Eosinophils # (Auto) 0.0, Basophils # (Auto) 0.1, 

Immature Granulocyte # (Auto) 0.2H, Neutrophils % (Manual) 71, Lymphocytes % 

(Manual) 9, Monocytes % (Manual) 7, Myelocytes % 2, Band Neutrophils 11, 

Platelet Estimate NORMAL, Macrocytosis SLIGHT, Blood Gas Puncture Site L RADIAL,

Blood Gas Patient Temperature NA, Arterial Blood pH 7.05*L, Arterial Blood 

Partial Pressure CO2 67H, Arterial Blood Partial Pressure O2 71L, Arterial Blood

HCO3 19L, Arterial Blood Total CO2 20.6L, Arterial Blood Oxygen Saturation 84L, 

Arterial Blood Base Excess -12.6L, Hansel Test NA, Blood Gas Ventilator Setting 

YES, Blood Gas Inspired Oxygen NA, Sodium Level 140, Potassium Level 3.6, 

Chloride Level 102, Carbon Dioxide Level 21, Anion Gap 17H, Blood Urea Nitrogen 

5L, Creatinine 0.81, Estimat Glomerular Filtration Rate 100, BUN/Creatinine 

Ratio 6, Glucose Level 213H, Lactic Acid Level 8.10*H, Calcium Level 8.1L, 

Corrected Calcium 8.9, Magnesium Level 2.0, Total Bilirubin 0.4, Aspartate Amino

Transf (AST/SGOT) 23, Alanine Aminotransferase (ALT/SGPT) 16, Alkaline 

Phosphatase 79, Troponin I 1.23*H, Pro-B-Type Natriuretic Peptide 5515.0H, Total

Protein 5.3L, Albumin 3.0L


2/3/23 08:20: 


Urine Color YELLOW, Urine Clarity TURBID, Urine pH 6.5, Urine Specific Gravity 

>=1.030, Urine Protein 2+H, Urine Glucose (UA) 1+H, Urine Ketones NEGATIVE, 

Urine Nitrite NEGATIVE, Urine Bilirubin NEGATIVE, Urine Urobilinogen 0.2, Urine 

Leukocyte Esterase NEGATIVE, Urine RBC (Auto) 1+H, Urine RBC 2-5H, Urine WBC 10-

25H, Urine Squamous Epithelial Cells NONE, Urine Crystals PRESENTH, Urine 

Amorphous Sediment FEW JOSAFAT URATESH, Urine Bacteria FEWH, Urine Casts PRESENT, 

Urine Hyaline Casts >50H, Urine Granular Casts 10-25H, Urine Mucus MODERATEH, 

Urine Culture Indicated YES


2/3/23 08:43: 


Influenza Type A (RT-PCR) Not Detected, Influenza Type B (RT-PCR) Not Detected, 

SARS-CoV-2 RNA (RT-PCR) Not Detected


2/3/23 10:39: 


Blood Gas Puncture Site LEFT RAD, Blood Gas Patient Temperature 36.1, Arterial 

Blood pH 7.17*L, Arterial Blood Partial Pressure CO2 64H, Arterial Blood Partial

Pressure O2 64L, Arterial Blood HCO3 23, Arterial Blood Total CO2 24.9, Arterial

Blood Oxygen Saturation 87L, Arterial Blood Base Excess -4.8L, Hansel Test YES-

POS, Blood Gas Ventilator Setting YES, Blood Gas Inspired Oxygen 20


2/3/23 10:54: 


Lactic Acid Level 6.78*H, Troponin I 4.482*H


2/3/23 14:07: 


White Blood Count 16.4H, Red Blood Count 4.16L, Hemoglobin 13.6, Hematocrit 41, 

Mean Corpuscular Volume 98, Mean Corpuscular Hemoglobin 33, Mean Corpuscular 

Hemoglobin Concent 34, Red Cell Distribution Width 12.5, Platelet Count 178, 

Mean Platelet Volume 9.8, Sodium Level 140, Potassium Level 3.6, Chloride Level 

102, Carbon Dioxide Level 22, Anion Gap 16H, Blood Urea Nitrogen 7, Creatinine 

0.78, Estimat Glomerular Filtration Rate 101, BUN/Creatinine Ratio 9, Glucose 

Level 154H, Calcium Level 8.2L, Magnesium Level 1.6, Triglycerides Level 85








A/P-Cardiology


Assessment/Admission Diagnosis


Cardioresp arrest on 2-3-22


- witnessed collapse per family - CPR started - EMS reports presenting rhythm as

v-fib (no strips available) - defibrillated x1 per EMS restoring SR (no 

documentation available)





Post arrest ECG without any ST elevation


- elevated troponin: NSTEMI vs type 2 MI due to transient anoxia





Aute respiratory failure requiring ventilation - likely multi-factorial


- Acute on chronic exacerbation of COPD; acute on chronic diastolic CHF





Sepsis


- UTI - management per medical services





Coronary artery disease


- Cardiac catheterization was done on June 6, 2022 by Dr. Posey, calcified 

coronary system with significant stenosis in the mid LAD, status post stenting 

using alma point stent 3 x 23 mm expanded to 3.1 mm with excellent results,


- Continue on dual antiplatelet therapy





Aute on Chronic diastolic CHF


- Echocardigoram of 1-20-23 showed LVEF 60-65%.  Grade 2 diastolic dysfunction. 

Mod MR.  Mild to mod aortic stenosis with mod AoR.  PASP 45-50 mmHg


- Echo on 2-3-23 (post-code): LVEF 55-60%, mild to mod AS, mild AI





H/O Left thigh pain, has been complaining of left leg pain


- BLE arterial duplex done 6/7/22 by Dr. Posey showing  bilateral hemodynamic 

significant stenoses on the left at the level of the common femoral and on the 

right at the level of the popliteal artery. 





Hypertension





Hyperlipidemia





History of CVA with right hemiparesis





Tobaccoism


- still an active smoker despite multiple advice to quit





Discussion and Recomendations





Post code - witnessed collapse at home - EMS reports v-fib at time of 

presentation which he was defibrillated x 1


- NSTEMI vs type 2 MI. No evidence of STEMI


- start DAPT (recent coronary intervention in June 2022 by Dr. Posey)


- Add BB as BP will allow


Sepsis - likely secondary to UTI - management per medical services


Acute resp failure


- likely multi-factorial d/t acute on chronic exacerbation of COPD, acute on 

chronic diastolic CHF


Acute on chronic diastolic CHF


- treat with diuretics as tolerated/indicated


Monitor lab closely


Replace electrolytes as indicated


Further recs will be based on his hospital course


We would like to thank Medical services for this consult











PEACE ADAN MD FACP FAC CCDS    Feb 3, 2023 16:12

## 2023-02-03 NOTE — PROGRESS NOTE-POST OPERATIVE
Post-Operative Progess Note


Surgeon (s)/Assistant (s)


Surgeon


GURPREET GONZALEZ DO


Assistant:  none





Pre-Operative Diagnosis


Hypotension, Venous Insufficiency, S/P Cardiac and Respiratory arrest





Post-Operative Diagnosis





Hypotension


Venous Insufficiency


S/P Cardiac and Respiratory arrest





Procedure & Operative Findings


Date of Procedure


2/3/23


Procedure Performed/Findings


Central line placement with US guidance











The patient was in their bed in the ICU, was prepped and draped in the 


sterile fashion. A surgical pause was performed. Ultrasound was used to 


locate the internal jugular vein.  Using an 18 gauge finder needle and 


watching with the US; the left internal jugular vein was accessed. Dark 


nonpulsatile blood was withdrawn. The wire was inserted.  US assured


proper placement. The needle was removed.  A [#11] blade scalpel was used 


to make a stab incision along the guidewire. Dilator sheath was then advanced 


over the wire using Seldinger technique and the dilator was removed. The


Groshong catheter was inserted over the guide wire using the Seldinger 


technique. The Groshong wire was removed. The catheter was then accessed 


in all three ports without difficulty. Good flash of blood was seen and it 


was then flushed with saline. The catheter was sutured in place with 3-0 


silk. The areas were then washed and dried. Sterile dressing was placed 


over incision. The patient tolerated the procedure well without complication.


Anesthesia Type


Pt sedated on vent





Estimated Blood Loss


Estimated blood loss (mL):  scant





Specimens/Packing


Specimens Removed


none











GURPREET GONZALEZ DO                Feb 3, 2023 18:05

## 2023-02-03 NOTE — DIAGNOSTIC IMAGING REPORT
INDICATION: Hypoxemia.



EXAMINATION: Portable chest at 04:46 p.m.



FINDINGS: There are emphysematous changes in the lungs. There is

some increased density at the left lung base that may be some

developing infiltrate. This was not apparent on the image from

earlier in the morning.



IMPRESSION: COPD. Questionable developing infiltrate at the left

lung base.



Dictated by: 



  Dictated on workstation # [id number]

## 2023-02-03 NOTE — CONSULTATION-CARDIOLOGY
HPI-Cardiology


Cardiology Consultation:


Date of Consultation


2/3/23


Time Seen by a Provider:  10:35


Date of Admission


2-3-23


Attending Physician


Lilliam Little


Admitting Physician


Admitting Physician:


Ria Harris MD 








Attending Physician:


Ria Harris MD


Consulting Physician


Cailin Alarcon MD


 


Primary Cardiologist:  Dr. Posey





HPI:


Chief Complaint:


Post Code


Mr. Rock is a 62 yr old male admitted to ICU 8 from the ED respiratory 

failure, post code on the ventilator.  He is unable to provide any information. 

There is no family present at this time.  Information regarding event is as per 

chart review:  Patient was downstairs when he yelled for breath stating he was 

short of breath. The patient then attempted to stand and collapsed.  He was 

unresponsive and upon family members arrival was apneic and pulseless.  Chest 

compressions were started and on EMS arrival, the patient was apneic, pulseless 

and found to be in V. fib and was defibrillated once.  The patient had 

approximately 10 minutes of chest compressions, received multiple doses of 

bicarb and epinephrine prior to return of his spontaneous circulation.





Review of Systems-Cardiology


Review of Systems


Other comments


Unable to obtain d/t sedation and intubation





All Other Systems Reviewed


Negative Unless Noted:  No





PMH-Social-Family Hx


Patient Social History


Smoking Status:  Current Everyday Smoker


Have you traveled recently?:  No


Alcohol Use?:  Unable to obtain


Pt feels they are or have been:  Unable to obtain


Tobacco type used:  Cigarettes





Past Medical History


PMH


As described under Assessment.





Family Medical History


Family Medical History:  


The patient does not know of any family history of premature coronary


artery disease in first-degree relatives.  (This is per chart review)





Allergies and Home Medications


Allergies


Coded Allergies:  


     No Known Drug Allergies (Unverified , 22)





Patient Home Medication List


Amlodipine Besylate (Amlodipine Besylate) 5 Mg Tablet, 5 MG PO DAILY


   Prescribed by: CANDIS WONG on 23 0943


   Last Action: Reviewed


Aspirin (Aspirin EC) 81 Mg Tablet.dr, 81 MG PO DAILY, (Reported)


   Entered as Reported by: DAYLIN JAY on 23 1257


   Last Action: Reviewed


Atorvastatin Calcium (Atorvastatin Calcium) 20 Mg Tablet, 20 MG PO DAILY, 

(Reported)


   Entered as Reported by: DAYLIN JAY on 23 1257


   Last Action: Reviewed


Clopidogrel Bisulfate (Clopidogrel) 75 Mg Tablet, 75 MG PO DAILY, (Reported)


   Entered as Reported by: DAYLIN JAY on 23 1257


   Last Action: Reviewed


Folic Acid (Folic Acid) 1 Mg Tablet, 1 MG PO DAILY


   Prescribed by: CANDIS WONG on 23 0944


   Last Action: Reviewed


Ipratropium/Albuterol Sulfate (Iprat-Albut 0.5-3(2.5) mg/3 ml) 0.5 Mg-3 Mg (2.5 

Mg Base)/3 Ml Ampul.neb, 3 ML IH Q6H PRN for SHORTNESS OF BREATH, (Reported)


   Entered as Reported by: DAYLIN JAY on 23 1257


   Last Action: Reviewed


Metoprolol Succinate (Metoprolol Succinate) 50 Mg Tab.er.24h, 50 MG PO DAILY, 

(Reported)


   Entered as Reported by: DAYLIN JAY on 23 1257


   Last Action: Reviewed


Oxycodone Hcl (Oxyir Tablet) 5 Mg Tab, 5 MG PO Q4HR PRN for PAIN-SEE DOSE 

INSTRUCTIONS


   Prescribed by: CANDIS WONG on 23 0945


   Last Action: Reviewed


Pantoprazole Sodium (Pantoprazole Sodium) 40 Mg Tablet.dr, 40 MG PO DAILY, 

(Reported)


   Entered as Reported by: DAYLIN JAY on 23 1257


   Last Action: Reviewed


Sertraline HCl (Sertraline HCl) 50 Mg Tablet, 50 MG PO DAILY, (Reported)


   Entered as Reported by: DAYLIN JAY on 23 1257


   Last Action: Reviewed


Thiamine HCl (Vitamin B-1) 100 Mg Tablet, 100 MG PO DAILY@0700


   Prescribed by: CANDIS WONG on 23 0944


   Last Action: Reviewed





Physical Exam-Cardiology


Physical Exam


Vital Signs/I&O











 23





 21:04 21:30 21:37 22:00


 


Pulse 105 105  96


 


Resp 18 18  18


 


B/P (MAP) 175/84 (128) 177/85 (128)  170/84 (120)


 


Pulse Ox 94 95  95


 


O2 Delivery Mechanical Ventilator Mechanical Ventilator  Mechanical Ventilator


 


O2 Flow Rate 25.00 25.00  25.00


 


FiO2   25 





 23





 22:30 22:36 22:54 23:00


 


Temp  36.8  


 


Pulse 92  90 90


 


Resp 18  16 18


 


B/P (MAP) 168/83 (126)   167/84 (126)


 


Pulse Ox 94  93 94


 


O2 Delivery Mechanical Ventilator   Mechanical Ventilator


 


O2 Flow Rate 25.00   25.00


 


FiO2   25 


 


    





 23





 23:12 23:59 00:00 01:00


 


Temp 36.9   


 


Pulse   92 90


 


Resp   18 18


 


B/P (MAP)   167/83 (111) 167/81 (128)


 


Pulse Ox  95 93 93


 


O2 Delivery   Mechanical Ventilator Mechanical Ventilator


 


O2 Flow Rate   25.00 25.00


 


FiO2  25  


 


    





 23





 01:00 01:37 02:00 02:30


 


Pulse 90  90 92


 


Resp   18 17


 


B/P (MAP)   169/77 (118) 


 


Pulse Ox   92 93


 


O2 Delivery   Mechanical Ventilator 


 


O2 Flow Rate   25.00 


 


FiO2  25  25





 23





 03:00 03:53 04:00 04:03


 


Temp  37.3  


 


Pulse 94  98 


 


Resp 18  18 


 


B/P (MAP) 161/84 (123)  162/79 (119) 


 


Pulse Ox 91  91 95


 


O2 Delivery Mechanical Ventilator  Mechanical Ventilator 


 


O2 Flow Rate 25.00  25.00 


 


FiO2    25


 


    





 23





 05:00 05:25 06:00 06:00


 


Pulse 93  93 93


 


Resp 18  24 18


 


B/P (MAP) 161/83 (126)  168/77 (107) 168/77 (107)


 


Pulse Ox 93  93 93


 


O2 Delivery Mechanical Ventilator  Mechanical Ventilator Mechanical Ventilator


 


O2 Flow Rate 25.00  25.00 25.00


 


FiO2  25  





 23





 06:40 07:00 07:30 08:00


 


Temp    36.7


 


Pulse 93 93 99 


 


Resp 16 15  


 


B/P (MAP)  167/81 (109)  


 


Pulse Ox 93 93  


 


O2 Delivery  Mechanical Ventilator  


 


O2 Flow Rate  25.00  


 


FiO2 25   














 23





 23:59


 


Intake Total 1150 ml


 


Output Total 525 ml


 


Balance 625 ml





Capillary Refill : Less Than 3 Seconds


Constitutional:  other (Intubated and sedated)


HEENT:  PERRL


Neck:  No carotid bruit; carotid pulses are 2 + bilaterally


Respiratory:  No accessory muscle use, No respiratory distress; other (coarse 

breath sounds throughout; currently on ventilator)


Cardiovascular:  regular rate-rhythm; No JVD; S1 and S2, systolic murmur


Gastrointestinal:  audible bowel sounds (NG tube in place)


Extremities:  no lower extremity edema bilateral


Neurologic/Psychiatric:  other (unable to participate in neuro exam d/t 

sedation)


Skin:  No rash on exposed areas, No ulcerations on exposed areas





Data Review


Labs


Laboratory Tests


23 10:43: B-Type Natriuretic Peptide 366.8H


23 12:02: Glucometer 136H


23 18:06: Glucometer 139H


23 23:30: Glucometer 132H


23 03:25: 


White Blood Count 8.3, Red Blood Count 3.34L, Hemoglobin 10.8L, Hematocrit 32L, 

Mean Corpuscular Volume 96, Mean Corpuscular Hemoglobin 32, Mean Corpuscular 

Hemoglobin Concent 34, Red Cell Distribution Width 12.9, Platelet Count 117L, 

Mean Platelet Volume 10.4, Immature Granulocyte % (Auto) 1, Neutrophils (%) 

(Auto) 91H, Lymphocytes (%) (Auto) 5L, Monocytes (%) (Auto) 3, Eosinophils (%) 

(Auto) 0, Basophils (%) (Auto) 0, Neutrophils # (Auto) 7.6, Lymphocytes # (Auto)

0.4L, Monocytes # (Auto) 0.3, Eosinophils # (Auto) 0.0, Basophils # (Auto) 0.0, 

Immature Granulocyte # (Auto) 0.0, Percent Immature Platelet Fraction 3.8, Blood

Gas Puncture Site L RAD, Blood Gas Patient Temperature 37.3, Arterial Blood pH 

7.49H, Arterial Blood Partial Pressure CO2 38, Arterial Blood Partial Pressure 

O2 46L, Arterial Blood HCO3 29H, Arterial Blood Total CO2 29.7, Arterial Blood 

Oxygen Saturation 80L, Arterial Blood Base Excess 5.1H, Hansel Test YES-POS, 

Blood Gas Ventilator Setting YES, Blood Gas Inspired Oxygen 25%, Sodium Level 

142, Potassium Level 3.4L, Chloride Level 107, Carbon Dioxide Level 25, Anion 

Gap 10, Blood Urea Nitrogen 20H, Creatinine 0.65, Estimat Glomerular Filtration 

Rate 107, BUN/Creatinine Ratio 31, Glucose Level 126H, Calcium Level 8.2L, 

Corrected Calcium 8.9, Phosphorus Level 3.1, Magnesium Level 2.2, Total 

Bilirubin 0.6, Aspartate Amino Transf (AST/SGOT) 35H, Alanine Aminotransferase 

(ALT/SGPT) 25, Alkaline Phosphatase 51, Total Protein 5.5L, Albumin 3.1L





Microbiology


2/3/23 MRSA Screen - Final, Complete


         MRSA not isolated


2/3/23 Urine Culture - Final, Complete


         NO GROWTH


2/3/23 Blood Culture - Preliminary, Resulted


         No growth








Radiology


NAME:   JAS ROCK


MED REC#:   [id number]


ACCOUNT#:   [id number]


PT STATUS:   REG ER


:   1960


PHYSICIAN:   GALO MONTOYA DO


ADMIT DATE:   23/ER FS


***Signed***


Date of Exam:23





CHEST 1 VIEW AP/PA ONLY








INDICATION: Respiratory distress





Portable chest 8:27 AM





There is an ET tube projecting over the trachea. NG tube appears


to enter the stomach. There are faint alveolar infiltrates in


both lungs. There is interstitial prominence. There are no


effusions or pneumothoraces.





IMPRESSION: Improving aeration of the lungs compared to


2023. There continues to be some faint groundglass


infiltrate and interstitial prominence in the lungs.





Dictated by: 





  Dictated on workstation # [id number]








Dict:   [id number]


Trans:   23 [id number]


AN [id number]





Interpreted by:     PARAS ZAPATA MD


Electronically signed by: PARAS ZAPATA MD [id number]





ECG Impression


ECG


Comment


SR with BBB





A/P-Cardiology


Assessment/Admission Diagnosis


Post code on 2-3-22


- witnessed collapse per family - CPR started - EMS reports presenting rhythm as

v-fib (no strips available) - defibrillated x1 per EMS restoring SR





NSTEMI 





Aute respiratory failure requiring ventilation - likely multi-factorial


- Acute on chronic exacerbation of COPD; acute on chronic diastolic CHF





Sepsis


- UTI - management per medical services





Coronary artery disease


- Cardiac catheterization was done on 2022 by Dr. Posey, calcified 

coronary system with severe stenosis in the mid LAD, status post stenting using 

alma point stent 3 x 23 mm expanded to 3.1 mm with excellent results,


- Continue on dual antiplatelet therapy





Aute on Chronic diastolic CHF


- Echocardigoram of 23 showed LVEF 60-65%.  Grade 2 diastolic dysfunction. 

Mod MR.  Mild to mod aortic stenosis with mod AoR.  PASP 45-50 mmHg





H/O Left thigh pain, has been complaining of left leg pain


- BLE arterial duplex done 22 by Dr. Posey showing  bilateral hemodynamic 

significant stenoses on the left at the level of the common femoral and on the 

right at the level of the popliteal artery. 





Hypertension





Hyperlipidemia





History of CVA with right hemiparesis





Tobaccoism


- still an active smoker


- cessation advised





Discussion and Recomendations


Post code - witnessed collapse at home - EMS reports v-fib at time of 

presentation which he was defibrillated x 1


- NSTEMI


- start DAPT (recent coronary intervention in 2022 by Dr. Posey)


- Add BB as BP will allow


Sepsis - likely secondary to UTI - management per medical services


Acute resp failure


- likely multi-factorial d/t acute on chronic exacerbation of COPD, acute on 

chronic diastolic CHF


Acute on chronic diastolic CHF


- treat with diuretics as tolerated/indicated


Monitor lab closely


Replace electrolytes as indicated


Further recs will be based on his hospital course


We would like to thank medical services for this consult











MANJINDER MCKEON            Feb 3, 2023 10:49

## 2023-02-03 NOTE — OCC THERAPY PROGRESS NOTE
Therapy Progress Note


Patient currently intubated and sedated.  OT will monitor patient's status and 

initiate treatment when patient is medically stable and able to actively 

participate with skilled therapy.











DRINNEN,MICHELLE OT             Feb 3, 2023 13:24

## 2023-02-03 NOTE — DIAGNOSTIC IMAGING REPORT
HISTORY: Endotracheal tube position.



COMPARISON: 02/03/2023.



TECHNIQUE: Frontal view of the chest.



FINDINGS: The alia is difficult to visualize, but the

endotracheal tube is estimated to be about 6.3 cm above the

alia. An enteric tube crosses the field-of-view. The left

central line tip projects over the low SVC. There is a small left

pleural effusion. There are mild airspace opacities in the left

lung base. No pneumothorax is seen. Lung volumes are mildly

large. The cardiac silhouette is stable in size.



IMPRESSION:

1. The alia is difficult to visualize on this exam, but the

endotracheal tube is estimated to be about 6.3 cm above the

alia.

2. Small left pleural effusion. Left basilar airspace opacity may

represent atelectasis or infiltrate.



Dictated by: 



  Dictated on workstation # [id number]

## 2023-02-04 VITALS — DIASTOLIC BLOOD PRESSURE: 61 MMHG | SYSTOLIC BLOOD PRESSURE: 99 MMHG

## 2023-02-04 VITALS — SYSTOLIC BLOOD PRESSURE: 121 MMHG | DIASTOLIC BLOOD PRESSURE: 68 MMHG

## 2023-02-04 VITALS — DIASTOLIC BLOOD PRESSURE: 65 MMHG | SYSTOLIC BLOOD PRESSURE: 105 MMHG

## 2023-02-04 VITALS — DIASTOLIC BLOOD PRESSURE: 69 MMHG | SYSTOLIC BLOOD PRESSURE: 126 MMHG

## 2023-02-04 VITALS — SYSTOLIC BLOOD PRESSURE: 117 MMHG | DIASTOLIC BLOOD PRESSURE: 69 MMHG

## 2023-02-04 LAB
ALBUMIN SERPL-MCNC: 3 GM/DL (ref 3.2–4.5)
ALP SERPL-CCNC: 69 U/L (ref 40–136)
ALT SERPL-CCNC: 41 U/L (ref 0–55)
ARTERIAL PATENCY WRIST A: (no result)
BASE EXCESS STD BLDA CALC-SCNC: -1.8 MMOL/L (ref -2.5–2.5)
BASOPHILS # BLD AUTO: 0 10^3/UL (ref 0–0.1)
BASOPHILS NFR BLD AUTO: 0 % (ref 0–10)
BDY SITE: (no result)
BILIRUB SERPL-MCNC: 0.5 MG/DL (ref 0.1–1)
BODY TEMPERATURE: 37.4
BUN/CREAT SERPL: 12
CALCIUM SERPL-MCNC: 8 MG/DL (ref 8.5–10.1)
CHLORIDE SERPL-SCNC: 105 MMOL/L (ref 98–107)
CO2 BLDA CALC-SCNC: 22.6 MMOL/L (ref 21–31)
CO2 SERPL-SCNC: 17 MMOL/L (ref 21–32)
CREAT SERPL-MCNC: 1.18 MG/DL (ref 0.6–1.3)
EOSINOPHIL # BLD AUTO: 0 10^3/UL (ref 0–0.3)
EOSINOPHIL NFR BLD AUTO: 0 % (ref 0–10)
GFR SERPLBLD BASED ON 1.73 SQ M-ARVRAT: 70 ML/MIN
GLUCOSE SERPL-MCNC: 224 MG/DL (ref 70–105)
HCT VFR BLD CALC: 36 % (ref 40–54)
HGB BLD-MCNC: 12.2 G/DL (ref 13.3–17.7)
INHALED O2 FLOW RATE: (no result) L/MIN
LYMPHOCYTES # BLD AUTO: 0.9 10^3/UL (ref 1–4)
LYMPHOCYTES NFR BLD AUTO: 4 % (ref 12–44)
MAGNESIUM SERPL-MCNC: 1.3 MG/DL (ref 1.6–2.4)
MANUAL DIFFERENTIAL PERFORMED BLD QL: NO
MCH RBC QN AUTO: 33 PG (ref 25–34)
MCHC RBC AUTO-ENTMCNC: 34 G/DL (ref 32–36)
MCV RBC AUTO: 97 FL (ref 80–99)
MONOCYTES # BLD AUTO: 0.8 10^3/UL (ref 0–1)
MONOCYTES NFR BLD AUTO: 4 % (ref 0–12)
NEUTROPHILS # BLD AUTO: 19.1 10^3/UL (ref 1.8–7.8)
NEUTROPHILS NFR BLD AUTO: 91 % (ref 42–75)
PCO2 BLDA: 32 MMHG (ref 35–45)
PH BLDA: 7.44 [PH] (ref 7.37–7.43)
PHOSPHATE SERPL-MCNC: 3.7 MG/DL (ref 2.3–4.7)
PLATELET # BLD: 196 10^3/UL (ref 130–400)
PMV BLD AUTO: 10.3 FL (ref 9–12.2)
PO2 BLDA: 78 MMHG (ref 79–93)
POTASSIUM SERPL-SCNC: 4 MMOL/L (ref 3.6–5)
PROT SERPL-MCNC: 6.2 GM/DL (ref 6.4–8.2)
SAO2 % BLDA FROM PO2: 97 % (ref 94–100)
SODIUM SERPL-SCNC: 141 MMOL/L (ref 135–145)
VENTILATION MODE VENT: YES
WBC # BLD AUTO: 21 10^3/UL (ref 4.3–11)

## 2023-02-04 RX ADMIN — METHYLPREDNISOLONE SODIUM SUCCINATE SCH MG: 125 INJECTION, POWDER, FOR SOLUTION INTRAMUSCULAR; INTRAVENOUS at 11:16

## 2023-02-04 RX ADMIN — LORAZEPAM SCH MLS/HR: 2 INJECTION INTRAMUSCULAR; INTRAVENOUS at 00:02

## 2023-02-04 RX ADMIN — INSULIN ASPART SCH UNIT: 100 INJECTION, SOLUTION INTRAVENOUS; SUBCUTANEOUS at 11:28

## 2023-02-04 RX ADMIN — SODIUM CHLORIDE SCH MLS/HR: 900 INJECTION INTRAVENOUS at 05:12

## 2023-02-04 RX ADMIN — METHYLPREDNISOLONE SODIUM SUCCINATE SCH MG: 125 INJECTION, POWDER, FOR SOLUTION INTRAMUSCULAR; INTRAVENOUS at 05:12

## 2023-02-04 RX ADMIN — ASPIRIN 81 MG CHEWABLE TABLET SCH MG: 81 TABLET CHEWABLE at 08:24

## 2023-02-04 RX ADMIN — SODIUM CHLORIDE SCH MLS/HR: 900 INJECTION INTRAVENOUS at 17:08

## 2023-02-04 RX ADMIN — PROPOFOL SCH MLS/HR: 10 INJECTION, EMULSION INTRAVENOUS at 15:04

## 2023-02-04 RX ADMIN — MAGNESIUM SULFATE IN DEXTROSE SCH MLS/HR: 10 INJECTION, SOLUTION INTRAVENOUS at 06:44

## 2023-02-04 RX ADMIN — ENOXAPARIN SODIUM SCH MG: 100 INJECTION SUBCUTANEOUS at 10:10

## 2023-02-04 RX ADMIN — LORAZEPAM SCH MLS/HR: 2 INJECTION INTRAMUSCULAR; INTRAVENOUS at 10:08

## 2023-02-04 RX ADMIN — PANTOPRAZOLE SODIUM SCH MG: 40 INJECTION, POWDER, FOR SOLUTION INTRAVENOUS at 08:24

## 2023-02-04 RX ADMIN — INSULIN ASPART SCH UNIT: 100 INJECTION, SOLUTION INTRAVENOUS; SUBCUTANEOUS at 23:30

## 2023-02-04 RX ADMIN — ALBUTEROL SULFATE SCH MG: 2.5 SOLUTION RESPIRATORY (INHALATION) at 10:35

## 2023-02-04 RX ADMIN — MAGNESIUM SULFATE IN DEXTROSE SCH MLS/HR: 10 INJECTION, SOLUTION INTRAVENOUS at 04:39

## 2023-02-04 RX ADMIN — METHYLPREDNISOLONE SODIUM SUCCINATE SCH MG: 125 INJECTION, POWDER, FOR SOLUTION INTRAMUSCULAR; INTRAVENOUS at 23:32

## 2023-02-04 RX ADMIN — ALBUTEROL SULFATE SCH MG: 2.5 SOLUTION RESPIRATORY (INHALATION) at 21:58

## 2023-02-04 RX ADMIN — SODIUM CHLORIDE, SODIUM LACTATE, POTASSIUM CHLORIDE, AND CALCIUM CHLORIDE SCH MLS/HR: 600; 310; 30; 20 INJECTION, SOLUTION INTRAVENOUS at 08:23

## 2023-02-04 RX ADMIN — MAGNESIUM SULFATE IN DEXTROSE SCH MLS/HR: 10 INJECTION, SOLUTION INTRAVENOUS at 04:06

## 2023-02-04 RX ADMIN — MAGNESIUM SULFATE IN DEXTROSE SCH MLS/HR: 10 INJECTION, SOLUTION INTRAVENOUS at 06:13

## 2023-02-04 RX ADMIN — FUROSEMIDE SCH MG: 10 INJECTION, SOLUTION INTRAVENOUS at 08:24

## 2023-02-04 RX ADMIN — POTASSIUM CHLORIDE SCH MLS/HR: 200 INJECTION, SOLUTION INTRAVENOUS at 04:06

## 2023-02-04 RX ADMIN — METHYLPREDNISOLONE SODIUM SUCCINATE SCH MG: 125 INJECTION, POWDER, FOR SOLUTION INTRAMUSCULAR; INTRAVENOUS at 17:07

## 2023-02-04 RX ADMIN — PROPOFOL SCH MLS/HR: 10 INJECTION, EMULSION INTRAVENOUS at 01:50

## 2023-02-04 RX ADMIN — LORAZEPAM SCH MLS/HR: 2 INJECTION INTRAMUSCULAR; INTRAVENOUS at 18:35

## 2023-02-04 RX ADMIN — METOPROLOL TARTRATE SCH MG: 1 INJECTION, SOLUTION INTRAVENOUS at 11:16

## 2023-02-04 RX ADMIN — ALBUTEROL SULFATE SCH MG: 2.5 SOLUTION RESPIRATORY (INHALATION) at 07:20

## 2023-02-04 RX ADMIN — INSULIN ASPART SCH UNIT: 100 INJECTION, SOLUTION INTRAVENOUS; SUBCUTANEOUS at 01:05

## 2023-02-04 RX ADMIN — METOPROLOL TARTRATE SCH MG: 1 INJECTION, SOLUTION INTRAVENOUS at 05:12

## 2023-02-04 RX ADMIN — METOPROLOL TARTRATE SCH MG: 25 TABLET, FILM COATED ORAL at 20:50

## 2023-02-04 RX ADMIN — ALBUTEROL SULFATE SCH MG: 2.5 SOLUTION RESPIRATORY (INHALATION) at 18:16

## 2023-02-04 RX ADMIN — CLOPIDOGREL BISULFATE SCH MG: 75 TABLET, FILM COATED ORAL at 08:24

## 2023-02-04 RX ADMIN — POTASSIUM CHLORIDE SCH MEQ: 1500 TABLET, EXTENDED RELEASE ORAL at 04:06

## 2023-02-04 RX ADMIN — ALBUTEROL SULFATE SCH MG: 2.5 SOLUTION RESPIRATORY (INHALATION) at 02:31

## 2023-02-04 RX ADMIN — Medication SCH MLS/HR: at 11:28

## 2023-02-04 RX ADMIN — INSULIN ASPART SCH UNIT: 100 INJECTION, SOLUTION INTRAVENOUS; SUBCUTANEOUS at 17:40

## 2023-02-04 RX ADMIN — SODIUM CHLORIDE SCH MLS/HR: 900 INJECTION INTRAVENOUS at 23:32

## 2023-02-04 RX ADMIN — INSULIN ASPART SCH UNIT: 100 INJECTION, SOLUTION INTRAVENOUS; SUBCUTANEOUS at 05:13

## 2023-02-04 RX ADMIN — SODIUM CHLORIDE SCH MLS/HR: 900 INJECTION INTRAVENOUS at 11:16

## 2023-02-04 RX ADMIN — ALBUTEROL SULFATE SCH MG: 2.5 SOLUTION RESPIRATORY (INHALATION) at 15:20

## 2023-02-04 NOTE — PHYSICAL THERAPY PROGRESS NOTE
Therapy Progress Note


Patient currently intubated and sedated.  PT will monitor patient's status and 

initiate treatment when patient is medically stable and able to actively 

participate with skilled therapy.











BENITEZ CAMACHO PT                  Feb 4, 2023 07:51

## 2023-02-04 NOTE — CARDIOLOGY PROGRESS NOTE
Subjective


Date Seen by Provider:  Feb 4, 2023


Time Seen by Provider:  14:00


Subjective/Events-last exam


Pt remains intubated and sedated. ON keppra for non-purposeful movements 

twitching- concern for myoclonic jerking (anoxic brain injury) vs seizures. Did 

not wake up after sedation holiday





Focused Exam


Lactate Level


2/3/23 08:18: Lactic Acid Level 8.10*H


2/3/23 10:54: Lactic Acid Level 6.78*H








Objective-Cardiology


Exam


Last Set of Vital Signs





Vital Signs








 2/4/23 2/4/23 2/4/23 2/4/23 2/4/23





 15:00 15:21 15:36 15:40 16:00


 


Temp     37.8


 


Pulse    88 


 


Resp  20   


 


B/P (MAP)    97/63 


 


Pulse Ox   100  


 


O2 Delivery   Mechanical Ventilator  


 


O2 Flow Rate 30.00    


 


FiO2   30  








I&O











Intake and Output 


 


 2/4/23





 00:00


 


Intake Total 340 ml


 


Output Total 2685 ml


 


Balance -2345 ml


 


 


 


Intake Oral 0 ml


 


IV Total 310 ml


 


Other 30 ml


 


Output Urine Total 2535 ml


 


Gastric Drainage Total 150 ml


 


Daily Weight Change No








General:  Other (intubated sedated)


Lungs:  Clear to Auscultation, Normal Air Movement


Heart:  Regular Rate, Normal S1, Normal S2, No Murmurs


Extremities:  No Clubbing, No Cyanosis


Skin:  No Rashes, No Significant Lesion





Results


Lab


Laboratory Tests


2/4/23 03:20














A/P-Cardiology


Assessment/Plan


Assessment and Plan: 





## Cardioresp arrest on 2-3-22 s/p DCCVx 1 by EMS; unknown time down


- pt now without purposeful movement; concern for myoclonic jerking vs seizures


- we will cont to follow along but not indication for cardiac catheterization 

particularly in light of poor neurological recovery


- for now, cont asa, plavix and metop (transition to metop po- based on COMMIT 

study results)





## Post arrest ECG without any ST elevation


- elevated troponin: NSTEMI vs type 2 MI likely due to transient anoxia





## Aute respiratory failure requiring ventilation - likely multi-factoria


- no vent over breathing with sedation holiday


- cont to monitor





## Coronary artery disease s/p PCI to mLAD in June 6, 2022 by Dr. Posey, 

calcified coronary system with significant stenosis in the mid LAD, status post 

stenting using alma point stent 3 x 23 mm expanded to 3.1 mm


- Continue on dual antiplatelet therapy





## Aute on Chronic diastolic CHF


- Echocardigoram of 1-20-23 showed LVEF 60-65%.  Grade 2 diastolic dysfunction. 

Mod MR.  Mild to mod aortic stenosis with mod AoR.  PASP 45-50 mmHg


- Echo on 2-3-23 (post-code): LVEF 55-60%, mild to mod AS, mild AI; no regional 

wall motion abnormalities were noted





## PVD: U/S demonstrating hemodynamic significant stenoses on the left at the 

level of the common femoral and on the right at the level of the popliteal 

artery. 





## Hypertension





## Hyperlipidemia





## History of CVA with right hemiparesis





## Tobaccoism


- still an active smoker despite multiple advice to quit











KLAUDIA MODI MD           Feb 4, 2023 16:14

## 2023-02-04 NOTE — TELE-ICU PROGRESS NOTE
Subjective


Date Seen by a Provider:  2023


Subjective/Events-last exam


This virtual visit was conducted using real time audio/video.


Thank you for asking us to see this patient for respiratory insufficiency due to

vfib arrest 2/3. Also possible sepsis and myoclonus versus seizure activity.


No response last night when Propofol held.





PE: VSS.  O2 sat 100% on AC20/550/30%/+5





HEENT: No obvious masses, adenopathy or JVD.


              Chest: coarse on auscultation.


              CV: RRR S1 S2 No murmur or added sounds.


              Abd: Non-tender. Bowel sounds Y.


              : Unremarkable. [place]iz[place] Y.


              CNS/psychiatric: Grossly intact. No obvious focal findings.


              Extremities: No edema. Capillary refill < 3 seconds.


              Skin: unremarkable.





Results: Elevated WCC 21, , Trop 4482, BNP 5515.   Decreased Hb 12.2.  

AB.44/32/78 on 30%.    CXR: Hyperinflated, small L eff., mild congestion..


Available chart/ vitals / labs / images reviewed.


Video assessment done using teleICU camera, rest of exam as per RN.





A/P:  Respiratory insufficiency: Continue present management with vent., albut.,

ativan, medrol, prop. Daily sedation vacation to assess neuro status.


          


          Critical Care: critically ill patient. Cont. abx, PPI, Keppra, levo., 

lasix, plavix, ASA, SSI, lovenox.





Discussed with RNs Aixa/Vibha. Asked RN to reach out to eICU if any questions 

or concerns later. 


Time spent with patient/coordination of care with other health professionals 

(mins): 20





Sepsis Event


Evaluation


Height, Weight, BMI


Height: '"


Weight: lbs. oz. kg; 23.40 BMI


Method:





Focused Exam


Lactate Level


2/3/23 08:18: Lactic Acid Level 8.10*H


2/3/23 10:54: Lactic Acid Level 6.78*H





Exam


Exam


Patient acknowledged, consented, and participated in this virtual visit which 

was conducted using real time audio/video


Vital Signs








  Date Time  Temp Pulse Resp B/P (MAP) Pulse Ox O2 Delivery O2 Flow Rate FiO2


 


23 08:00 37.1       


 


23 08:00  89 12 109/63 (78) 97 Mechanical Ventilator 30.00 


 


23 07:20  85 20  99   30


 


23 07:00  86      


 


23 07:00  86 20 115/73 (89) 100 Mechanical Ventilator 30.00 


 


23 06:00  85 20 117/69 (85) 99 Mechanical Ventilator 30.00 


 


23 05:53    121/75    


 


23 05:53  90  121/75    


 


23 05:04        30


 


23 05:00  90 21 123/74 (90) 99 Mechanical Ventilator 30.00 


 


23 04:38  90  131/77    


 


23 04:00     100 Mechanical Ventilator  30


 


23 04:00  89  120/72    


 


23 04:00  89 16 120/72 (88) 100 Mechanical Ventilator 30.00 


 


23 03:21 37.4 87 20 117/66 (83) 100 Mechanical Ventilator 30.00 


 


23 03:00  86 20 109/67 (81) 99 Mechanical Ventilator 30.00 


 


23 02:38        30


 


23 02:38      Mechanical Ventilator 30.00 


 


23 02:36        30


 


23 02:29  84 20  100   40


 


23 02:00  87 20 115/75 (88) 100 Mechanical Ventilator 40.00 


 


23 01:51        40


 


23 01:50  86  107/68    


 


23 01:00  84      


 


23 01:00  84 21 106/62 (77) 100 Mechanical Ventilator 40.00 


 


23 00:02  84  101/61    


 


23 00:00  84 21 104/63 (77) 100 Mechanical Ventilator 40.00 


 


2/3/23 23:20     100 Mechanical Ventilator  40


 


2/3/23 23:15 37.6 91 20 113/71 (85) 100 Mechanical Ventilator 40.00 


 


2/3/23 23:00  89 20 126/75 (92) 100 Mechanical Ventilator 50.00 


 


2/3/23 22:00  92 35 106/74 (85) 100 Mechanical Ventilator 50.00 


 


2/3/23 22:00    110/71    


 


2/3/23 22:00  95  110/71    


 


2/3/23 21:51  96 23  100   50


 


2/3/23 21:47  95  145/79    


 


2/3/23 21:39        50


 


2/3/23 21:37  93  87/61    


 


2/3/23 21:00  97 27 109/71 (84) 100 Mechanical Ventilator 50.00 


 


2/3/23 20:51        50


 


2/3/23 20:34  96  154/91    


 


2/3/23 20:04  96  133/76    


 


2/3/23 20:00  96 18 133/76 (95) 99 Mechanical Ventilator 50.00 


 


2/3/23 20:00     100 Mechanical Ventilator  50


 


2/3/23 20:00 37.8       


 


2/3/23 19:10      Mechanical Ventilator 50.00 


 


2/3/23 19:03  81 34  100   60


 


2/3/23 19:00  81      


 


2/3/23 19:00  80 31 117/57 (77) 100 Mechanical Ventilator 50.00 


 


2/3/23 19:00      Mechanical Ventilator 60.00 


 


2/3/23 19:00        60


 


2/3/23 19:00      Mechanical Ventilator 50.00 


 


2/3/23 19:00  81  117/57    


 


2/3/23 18:31    97/59 (72)    


 


2/3/23 18:00  71 33 84/51 (60) 100 Mechanical Ventilator 60.00 


 


2/3/23 17:38  73  54/33    


 


2/3/23 17:09  90  98/73    


 


2/3/23 17:00  90 37 98/73 (81) 100 Mechanical Ventilator 60.00 


 


2/3/23 16:44  87 29  100   60


 


2/3/23 16:10     100 Mechanical Ventilator  60


 


2/3/23 16:00  89  96/70 (79) 100 Mechanical Ventilator 60.00 


 


2/3/23 16:00 36.3     Non Rebreather  


 


2/3/23 15:00  96 25 108/94 (99) 100 Mechanical Ventilator 60.00 


 


2/3/23 15:00  96  108/94    


 


2/3/23 14:46      Mechanical Ventilator 60.00 


 


2/3/23 14:00  90 25 120/108 (112) 100 Mechanical Ventilator 88.00 


 


2/3/23 13:00  110 25 123/82 (92) 100 Mechanical Ventilator 88.00 


 


2/3/23 12:57  87      


 


2/3/23 12:15     100 Mechanical Ventilator  


 


2/3/23 12:00  85 28 109/45 (90) 100 Mechanical Ventilator 88.00 


 


2/3/23 12:00 33.0       


 


2/3/23 11:00  96 24 137/111 (123) 100 Mechanical Ventilator 88.00 


 


2/3/23 10:49  96      


 


2/3/23 10:30 35.9 96 22 105/74 (84) 100 Mechanical Ventilator 88.00 


 


2/3/23 10:25     96 Mechanical Ventilator  100


 


2/3/23 10:00  94 30  100   88


 


2/3/23 09:15  87 19 165/64 100 Room Air  














I & O 


 


 23





 07:00


 


Intake Total 890 ml


 


Output Total 3095 ml


 


Balance -2205 ml








Height & Weight


Height: '"


Weight: lbs. oz. kg; 23.40 BMI


Method:


General Appearance:  No Apparent Distress, WD/WN, Other (Unresponsive, intubated

without sedation, manual ventilation via bag-valve-mask.)


HEENT:  Other (Pupils sluggishly reactive, endotracheal tube in place 21 at the 

lip.)


Neck:  Non Tender, Supple


Respiratory:  Decreased Breath Sounds, Rhonci, Other (Tachypneic, overbreathing 

vent)


Cardiovascular:  Extra Beats, Tachycardia


Capillary Refill:  Less Than 3 Seconds


Peripheral Pulses:  2+ Radial Pulses (R), 2+ Radial Pulses (L)


Gastrointestinal:  soft, no organomegaly


Neurologic/Psychiatric:  Other (Unresponsive)





Results


Lab


Laboratory Tests


2/3/23 08:18








2/3/23 14:07








23 03:20











Assessment/Plan


Assessment/Plan


See free text.


Critical Care:  Ventilator Management











KATE AGUILAR MD           2023 09:20

## 2023-02-04 NOTE — PROGRESS NOTE - HOSPITALIST
Subjective


HPI/CC On Admission


Date Seen by Provider:  Feb 4, 2023


Time Seen by Provider:  11:00


Subjective/Events-last exam


Patient remains on the ventilator


Anoxic brain injury suspected


Patient had severe COPD and was extubated last time which was surprising


Talk to daughter about DNR she will talk to her mother





Review of Systems


General:  Fatigue, Malaise





Focused Exam


Lactate Level


2/3/23 08:18: Lactic Acid Level 8.10*H


2/3/23 10:54: Lactic Acid Level 6.78*H








Objective


Exam


Vital Signs





Vital Signs








  Date Time  Temp Pulse Resp B/P (MAP) Pulse Ox O2 Delivery O2 Flow Rate FiO2


 


2/4/23 12:00  91 23 118/67 (89) 98 Mechanical Ventilator 30.00 


 


2/4/23 12:00 36.9       


 


2/4/23 11:57        30





Capillary Refill : Less Than 3 Seconds


General Appearance:  No Apparent Distress, WD/WN, Chronically ill, Other 

(Intubated and sedated)


Respiratory:  Lungs Clear, Normal Breath Sounds





Results/Procedures


Lab


Laboratory Tests


2/3/23 14:07








2/4/23 03:20








Patient resulted labs reviewed.





Assessment/Plan


Assessment and Plan


Assess & Plan/Chief Complaint


Assessment:


Cardiac arrest


Respiratory arrest


Severe COPD


Smoker


Alcohol use


Severe decline in status





Plan:


Suspected anoxic brain injury


Needs DNR











CANDIS WONG DO                 Feb 4, 2023 06:34

## 2023-02-05 VITALS — DIASTOLIC BLOOD PRESSURE: 66 MMHG | SYSTOLIC BLOOD PRESSURE: 125 MMHG

## 2023-02-05 VITALS — DIASTOLIC BLOOD PRESSURE: 69 MMHG | SYSTOLIC BLOOD PRESSURE: 126 MMHG

## 2023-02-05 VITALS — DIASTOLIC BLOOD PRESSURE: 73 MMHG | SYSTOLIC BLOOD PRESSURE: 150 MMHG

## 2023-02-05 VITALS — DIASTOLIC BLOOD PRESSURE: 73 MMHG | SYSTOLIC BLOOD PRESSURE: 140 MMHG

## 2023-02-05 VITALS — SYSTOLIC BLOOD PRESSURE: 164 MMHG | DIASTOLIC BLOOD PRESSURE: 76 MMHG

## 2023-02-05 VITALS — SYSTOLIC BLOOD PRESSURE: 130 MMHG | DIASTOLIC BLOOD PRESSURE: 72 MMHG

## 2023-02-05 VITALS — DIASTOLIC BLOOD PRESSURE: 68 MMHG | SYSTOLIC BLOOD PRESSURE: 125 MMHG

## 2023-02-05 LAB
ALBUMIN SERPL-MCNC: 2.9 GM/DL (ref 3.2–4.5)
ALP SERPL-CCNC: 56 U/L (ref 40–136)
ALT SERPL-CCNC: 33 U/L (ref 0–55)
ARTERIAL PATENCY WRIST A: (no result)
BASE EXCESS STD BLDA CALC-SCNC: 3 MMOL/L (ref -2.5–2.5)
BASOPHILS # BLD AUTO: 0 10^3/UL (ref 0–0.1)
BASOPHILS NFR BLD AUTO: 0 % (ref 0–10)
BDY SITE: (no result)
BILIRUB SERPL-MCNC: 0.5 MG/DL (ref 0.1–1)
BODY TEMPERATURE: 36.8
BUN/CREAT SERPL: 18
CALCIUM SERPL-MCNC: 7.8 MG/DL (ref 8.5–10.1)
CHLORIDE SERPL-SCNC: 105 MMOL/L (ref 98–107)
CO2 BLDA CALC-SCNC: 26.9 MMOL/L (ref 21–31)
CO2 SERPL-SCNC: 21 MMOL/L (ref 21–32)
CREAT SERPL-MCNC: 0.84 MG/DL (ref 0.6–1.3)
EOSINOPHIL # BLD AUTO: 0 10^3/UL (ref 0–0.3)
EOSINOPHIL NFR BLD AUTO: 0 % (ref 0–10)
GFR SERPLBLD BASED ON 1.73 SQ M-ARVRAT: 99 ML/MIN
GLUCOSE SERPL-MCNC: 172 MG/DL (ref 70–105)
HCT VFR BLD CALC: 32 % (ref 40–54)
HGB BLD-MCNC: 11.1 G/DL (ref 13.3–17.7)
INHALED O2 FLOW RATE: (no result) L/MIN
LYMPHOCYTES # BLD AUTO: 0.7 10^3/UL (ref 1–4)
LYMPHOCYTES NFR BLD AUTO: 6 % (ref 12–44)
MAGNESIUM SERPL-MCNC: 2.1 MG/DL (ref 1.6–2.4)
MANUAL DIFFERENTIAL PERFORMED BLD QL: NO
MCH RBC QN AUTO: 33 PG (ref 25–34)
MCHC RBC AUTO-ENTMCNC: 35 G/DL (ref 32–36)
MCV RBC AUTO: 95 FL (ref 80–99)
MONOCYTES # BLD AUTO: 0.8 10^3/UL (ref 0–1)
MONOCYTES NFR BLD AUTO: 6 % (ref 0–12)
NEUTROPHILS # BLD AUTO: 10.9 10^3/UL (ref 1.8–7.8)
NEUTROPHILS NFR BLD AUTO: 87 % (ref 42–75)
PCO2 BLDA: 32 MMHG (ref 35–45)
PH BLDA: 7.52 [PH] (ref 7.37–7.43)
PHOSPHATE SERPL-MCNC: 2.7 MG/DL (ref 2.3–4.7)
PLATELET # BLD: 129 10^3/UL (ref 130–400)
PMV BLD AUTO: 10.4 FL (ref 9–12.2)
PO2 BLDA: 80 MMHG (ref 79–93)
POTASSIUM SERPL-SCNC: 3.6 MMOL/L (ref 3.6–5)
PROT SERPL-MCNC: 5.5 GM/DL (ref 6.4–8.2)
SAO2 % BLDA FROM PO2: 98 % (ref 94–100)
SODIUM SERPL-SCNC: 139 MMOL/L (ref 135–145)
VENTILATION MODE VENT: YES
WBC # BLD AUTO: 12.4 10^3/UL (ref 4.3–11)

## 2023-02-05 RX ADMIN — METHYLPREDNISOLONE SODIUM SUCCINATE SCH MG: 125 INJECTION, POWDER, FOR SOLUTION INTRAMUSCULAR; INTRAVENOUS at 05:35

## 2023-02-05 RX ADMIN — FUROSEMIDE SCH MG: 10 INJECTION, SOLUTION INTRAVENOUS at 09:22

## 2023-02-05 RX ADMIN — SODIUM CHLORIDE SCH MLS/HR: 900 INJECTION INTRAVENOUS at 23:38

## 2023-02-05 RX ADMIN — INSULIN ASPART SCH UNIT: 100 INJECTION, SOLUTION INTRAVENOUS; SUBCUTANEOUS at 23:35

## 2023-02-05 RX ADMIN — ASPIRIN 81 MG CHEWABLE TABLET SCH MG: 81 TABLET CHEWABLE at 09:20

## 2023-02-05 RX ADMIN — POTASSIUM CHLORIDE SCH MLS/HR: 200 INJECTION, SOLUTION INTRAVENOUS at 03:28

## 2023-02-05 RX ADMIN — POTASSIUM CHLORIDE SCH MEQ: 1500 TABLET, EXTENDED RELEASE ORAL at 03:28

## 2023-02-05 RX ADMIN — LORAZEPAM SCH MLS/HR: 2 INJECTION INTRAMUSCULAR; INTRAVENOUS at 19:46

## 2023-02-05 RX ADMIN — ALBUTEROL SULFATE SCH MG: 2.5 SOLUTION RESPIRATORY (INHALATION) at 02:47

## 2023-02-05 RX ADMIN — POTASSIUM CHLORIDE SCH MLS/HR: 200 INJECTION, SOLUTION INTRAVENOUS at 03:37

## 2023-02-05 RX ADMIN — Medication SCH MLS/HR: at 07:16

## 2023-02-05 RX ADMIN — LORAZEPAM SCH MLS/HR: 2 INJECTION INTRAMUSCULAR; INTRAVENOUS at 09:46

## 2023-02-05 RX ADMIN — SODIUM CHLORIDE SCH MLS/HR: 900 INJECTION INTRAVENOUS at 05:35

## 2023-02-05 RX ADMIN — PROPOFOL SCH MLS/HR: 10 INJECTION, EMULSION INTRAVENOUS at 02:00

## 2023-02-05 RX ADMIN — METHYLPREDNISOLONE SODIUM SUCCINATE SCH MG: 125 INJECTION, POWDER, FOR SOLUTION INTRAMUSCULAR; INTRAVENOUS at 18:39

## 2023-02-05 RX ADMIN — ALBUTEROL SULFATE SCH MG: 2.5 SOLUTION RESPIRATORY (INHALATION) at 14:45

## 2023-02-05 RX ADMIN — PROPOFOL SCH MLS/HR: 10 INJECTION, EMULSION INTRAVENOUS at 13:37

## 2023-02-05 RX ADMIN — METOPROLOL TARTRATE SCH MG: 25 TABLET, FILM COATED ORAL at 09:20

## 2023-02-05 RX ADMIN — INSULIN ASPART SCH UNIT: 100 INJECTION, SOLUTION INTRAVENOUS; SUBCUTANEOUS at 18:39

## 2023-02-05 RX ADMIN — ENOXAPARIN SODIUM SCH MG: 100 INJECTION SUBCUTANEOUS at 09:22

## 2023-02-05 RX ADMIN — METHYLPREDNISOLONE SODIUM SUCCINATE SCH MG: 125 INJECTION, POWDER, FOR SOLUTION INTRAMUSCULAR; INTRAVENOUS at 11:54

## 2023-02-05 RX ADMIN — ALBUTEROL SULFATE SCH MG: 2.5 SOLUTION RESPIRATORY (INHALATION) at 07:26

## 2023-02-05 RX ADMIN — SODIUM CHLORIDE, SODIUM LACTATE, POTASSIUM CHLORIDE, AND CALCIUM CHLORIDE SCH MLS/HR: 600; 310; 30; 20 INJECTION, SOLUTION INTRAVENOUS at 02:00

## 2023-02-05 RX ADMIN — ALBUTEROL SULFATE SCH MG: 2.5 SOLUTION RESPIRATORY (INHALATION) at 10:30

## 2023-02-05 RX ADMIN — MAGNESIUM SULFATE IN DEXTROSE SCH MLS/HR: 10 INJECTION, SOLUTION INTRAVENOUS at 03:28

## 2023-02-05 RX ADMIN — SODIUM CHLORIDE SCH MLS/HR: 900 INJECTION INTRAVENOUS at 11:54

## 2023-02-05 RX ADMIN — INSULIN ASPART SCH UNIT: 100 INJECTION, SOLUTION INTRAVENOUS; SUBCUTANEOUS at 11:54

## 2023-02-05 RX ADMIN — INSULIN ASPART SCH UNIT: 100 INJECTION, SOLUTION INTRAVENOUS; SUBCUTANEOUS at 05:30

## 2023-02-05 RX ADMIN — ALBUTEROL SULFATE SCH MG: 2.5 SOLUTION RESPIRATORY (INHALATION) at 22:37

## 2023-02-05 RX ADMIN — METHYLPREDNISOLONE SODIUM SUCCINATE SCH MG: 125 INJECTION, POWDER, FOR SOLUTION INTRAMUSCULAR; INTRAVENOUS at 23:35

## 2023-02-05 RX ADMIN — ALBUTEROL SULFATE SCH MG: 2.5 SOLUTION RESPIRATORY (INHALATION) at 18:54

## 2023-02-05 RX ADMIN — SODIUM CHLORIDE, SODIUM LACTATE, POTASSIUM CHLORIDE, AND CALCIUM CHLORIDE SCH MLS/HR: 600; 310; 30; 20 INJECTION, SOLUTION INTRAVENOUS at 23:15

## 2023-02-05 RX ADMIN — PANTOPRAZOLE SODIUM SCH MG: 40 INJECTION, POWDER, FOR SOLUTION INTRAVENOUS at 09:20

## 2023-02-05 RX ADMIN — METOPROLOL TARTRATE SCH MG: 25 TABLET, FILM COATED ORAL at 21:29

## 2023-02-05 RX ADMIN — SODIUM CHLORIDE SCH MLS/HR: 900 INJECTION INTRAVENOUS at 18:39

## 2023-02-05 RX ADMIN — CLOPIDOGREL BISULFATE SCH MG: 75 TABLET, FILM COATED ORAL at 09:20

## 2023-02-05 NOTE — CARDIOLOGY PROGRESS NOTE
Subjective


Date Seen by Provider:  Feb 5, 2023


Time Seen by Provider:  09:45


Subjective/Events-last exam


No acute issues overnight. Sedation discontinued at 0230 this AM. Now at 1030AM,

no signs of neurologic activity noted.





Focused Exam


Lactate Level


2/3/23 08:18: Lactic Acid Level 8.10*H


2/3/23 10:54: Lactic Acid Level 6.78*H








Objective-Cardiology


Exam


Last Set of Vital Signs





Vital Signs








 2/5/23 2/5/23 2/5/23





 07:52 09:37 10:00


 


Temp 36.7  


 


Pulse   89


 


Resp   20


 


B/P (MAP)   141/70 (96)


 


Pulse Ox   96


 


O2 Delivery   Mechanical Ventilator


 


O2 Flow Rate   25.00


 


FiO2  30 








I&O











Intake and Output 


 


 2/5/23





 00:00


 


Intake Total 820 ml


 


Output Total 1810 ml


 


Balance -990 ml


 


 


 


Intake Oral 0 ml


 


IV Total 700 ml


 


Other 120 ml


 


Output Urine Total 1460 ml


 


Gastric Drainage Total 350 ml








General:  Other (intubated sedated)


Lungs:  Clear to Auscultation, Normal Air Movement


Heart:  Regular Rate, Normal S1, Normal S2, No Murmurs


Extremities:  No Clubbing, No Cyanosis


Skin:  No Rashes, No Significant Lesion





Results


Lab


Laboratory Tests


2/5/23 02:57














A/P-Cardiology


Assessment/Plan


Assessment and Plan: 





## Cardioresp arrest on 2-3-22 s/p DCCVx 1 by EMS; unknown time down


- pt now without purposeful movement; concern for myoclonic jerking vs seizures


- we will cont to follow along but no indication for cardiac catheterization 

particularly in light of poor neurological recovery


- for now, cont asa, plavix and metop





## Post arrest ECG without any ST elevation


- elevated troponin: NSTEMI vs type 2 MI likely due to transient anoxia





## Aute respiratory failure requiring ventilation - likely multi-factoria


- no vent over breathing with sedation holiday; concern for poor neurologic 

status


- cont to monitor





## Coronary artery disease s/p PCI to mLAD in June 6, 2022 by Dr. Posey, 

calcified coronary system with significant stenosis in the mid LAD, status post 

stenting using alma point stent 3 x 23 mm expanded to 3.1 mm


- Continue on dual antiplatelet therapy





## Aute on Chronic diastolic CHF


- Echocardigoram of 1-20-23 showed LVEF 60-65%.  Grade 2 diastolic dysfunction. 

Mod MR.  Mild to mod aortic stenosis with mod AoR.  PASP 45-50 mmHg


- Echo on 2-3-23 (post-code): LVEF 55-60%, mild to mod AS, mild AI; no regional 

wall motion abnormalities were noted





## Dispo: 


- family discussion necessary to define goals of care (GOC) at this point- poor 

neurologic recovery given no signs of neurologic activity despite sedation 

holiday for 8 hours. 


- hold on any cardiac intervention at this time.


- will consider signing off once GOC defined











KLAUDIA MODI MD           Feb 5, 2023 10:37

## 2023-02-05 NOTE — TELE-ICU PROGRESS NOTE
Subjective


Date Seen by a Provider:  2023


Subjective/Events-last exam


This virtual visit was conducted using real time audio/video.


Thank you for asking us to see this patient for respiratory insufficiency due to

vfib arrest 2/3. Also possible sepsis and myoclonus versus seizure activity.


No response today so far as Propofol held.





PE: VSS.  O2 sat 97% on AC20/550/30%/+5





HEENT: No obvious masses, adenopathy or JVD.


              Chest: coarse on auscultation.


              CV: RRR S1 S2 No murmur or added sounds.


              Abd: Non-tender. Bowel sounds Y.


              : Unremarkable. [place] Y.


              CNS/psychiatric: Grossly intact. No obvious focal findings.


              Extremities: No edema. Capillary refill < 3 seconds.


              Skin: unremarkable.





Results: Elevated WCC 12.4, improved, , Trop 4482, BNP 5515.   Decreased 

Hb 11.1.  AB.52/32/80 on 30%.    CXR: Hyperinflated, small L eff., mild 

congestion..


Available chart/ vitals / labs / images reviewed.


Video assessment done using teleICU camera, rest of exam as per RN.





A/P:  Respiratory insufficiency: Continue present management with vent., albut.,

ativan, medrol, prop. Daily sedation vacation to assess neuro status.


          


          Critical Care: critically ill patient. Cont. abx, PPI, Keppra, levo., 

lasix, plavix, ASA, SSI, lovenox, metop., statin.


   Possible support withdrawal in next 24-48 hours.





Discussed with RN Kay. Asked RN to reach out to eICU if any questions or 

concerns later. 


Time spent with patient/coordination of care with other health professionals 

(mins): 22





Sepsis Event


Evaluation


Height, Weight, BMI


Height: '"


Weight: lbs. oz. kg; 23.50 BMI


Method:





Focused Exam


Lactate Level


2/3/23 08:18: Lactic Acid Level 8.10*H


2/3/23 10:54: Lactic Acid Level 6.78*H





Exam


Exam


Patient acknowledged, consented, and participated in this virtual visit which 

was conducted using real time audio/video


Vital Signs








  Date Time  Temp Pulse Resp B/P (MAP) Pulse Ox O2 Delivery O2 Flow Rate FiO2


 


23 08:00  91 20 123/64 (89) 95 Mechanical Ventilator 25.00 


 


23 07:52 36.7       


 


23 07:00  89 16 125/66 (84) 97 Mechanical Ventilator 25.00 


 


23 07:00  88      


 


23 06:00  89 20 119/66 (83) 97 Mechanical Ventilator 30.00 


 


23 05:00        30


 


23 05:00  92 20 129/64 (85) 97 Mechanical Ventilator 30.00 


 


23 04:00  92 20 137/73 (94) 98 Mechanical Ventilator 30.00 


 


23 03:39 36.0       


 


23 03:30     99 Mechanical Ventilator  30


 


23 03:00  88 20 137/73 (94) 99 Mechanical Ventilator 30.00 


 


23 02:47  86 20  98   30


 


23 02:00        30


 


23 02:00  85  131/66    


 


23 02:00  86 16 115/65 (82) 98 Mechanical Ventilator 30.00 


 


23 01:00  86 22 118/66 (83) 97 Mechanical Ventilator 30.00 


 


23 01:00  86      


 


23 00:00  88 20 114/66 (82) 97 Mechanical Ventilator 30.00 


 


23 23:35     97 Mechanical Ventilator  30


 


23 23:30 36.7     Mechanical Ventilator 30.00 


 


23 23:28  89  131/66    


 


23 23:00  91 20 126/71 (89) 97 Mechanical Ventilator 30.00 


 


23 22:35  89  131/66    


 


23 22:00  90 21 123/68 (86) 99 Mechanical Ventilator 30.00 


 


23 21:59  90 20  99   30


 


23 21:24        30


 


23 21:00  91 20 116/64 (81) 99 Mechanical Ventilator 30.00 


 


23 20:00  86 20 131/69 (89) 98 Mechanical Ventilator 30.00 


 


23 20:00     98 Mechanical Ventilator  30


 


23 19:58  86  143/73    


 


23 19:43 36.4       


 


23 19:00  86 16 138/75 (96) 99 Mechanical Ventilator 30.00 


 


23 19:00  90      


 


23 18:16  84 20  99   30


 


23 18:00  84 31 128/67 (90) 99 Mechanical Ventilator 30.00 


 


23 17:12        30


 


23 17:00  86 20 111/68 (83) 98 Mechanical Ventilator 30.00 


 


23 16:00 37.8       


 


23 16:00  87 20 108/66 (81) 99 Mechanical Ventilator 30.00 


 


23 15:40  88  97/63    


 


23 15:36     100 Mechanical Ventilator  30


 


23 15:21  87 20  100   30


 


23 15:04  88  97/63    


 


23 15:00  87 20 97/63 (76) 98 Mechanical Ventilator 30.00 


 


23 14:00  89 20 86/58 (67) 99 Mechanical Ventilator 30.00 


 


23 13:18        30


 


23 13:00  92 9 117/68 (86) 98 Mechanical Ventilator 30.00 


 


23 13:00  91      


 


23 12:00  91 23 118/67 (89) 98 Mechanical Ventilator 30.00 


 


23 12:00 36.9       


 


23 11:57     100 Mechanical Ventilator  30


 


23 11:00  94 21 110/67 (82) 98 Mechanical Ventilator 30.00 


 


23 10:35  92 20  98   30


 


23 10:08  92      


 


23 10:00  92 20 111/65 (86) 98 Mechanical Ventilator 30.00 


 


23 09:36        30


 


23 09:00  90 20 100/63 (77) 99 Mechanical Ventilator 30.00 














I & O 


 


 23





 06:59


 


Intake Total 1480 ml


 


Output Total 1745 ml


 


Balance -265 ml








Height & Weight


Height: '"


Weight: lbs. oz. kg; 23.50 BMI


Method:


General Appearance:  No Apparent Distress, WD/WN, Chronically ill, Other 

(Intubated and sedated)


HEENT:  Other (Pupils sluggishly reactive, endotracheal tube in place 21 at the 

lip.)


Neck:  Non Tender, Supple


Respiratory:  Lungs Clear, Normal Breath Sounds


Cardiovascular:  Extra Beats, Tachycardia


Capillary Refill:  Less Than 3 Seconds


Peripheral Pulses:  2+ Radial Pulses (R), 2+ Radial Pulses (L)


Gastrointestinal:  soft, no organomegaly


Neurologic/Psychiatric:  Other (Unresponsive)





Results


Lab


Laboratory Tests


2/3/23 14:07








23 03:20








23 02:57











Assessment/Plan


Assessment/Plan


See free text.


Critical Care:  Ventilator Management











KATE AGUILAR MD           2023 08:50

## 2023-02-05 NOTE — PROGRESS NOTE - HOSPITALIST
Report called to [address]ado RN  10/26/21 2008 Subjective


HPI/CC On Admission


Date Seen by Provider:  Feb 5, 2023


Time Seen by Provider:  11:00


Subjective/Events-last exam


Patient doing the same


DNR order put in after daughter updated nurse on their decision


Reviewed labs and meds





Focused Exam


Lactate Level


2/3/23 08:18: Lactic Acid Level 8.10*H


2/3/23 10:54: Lactic Acid Level 6.78*H








Objective


Exam


Vital Signs





Vital Signs








  Date Time  Temp Pulse Resp B/P (MAP) Pulse Ox O2 Delivery O2 Flow Rate FiO2


 


2/5/23 12:00  92 15 151/75 (108) 96 Mechanical Ventilator 25.00 


 


2/5/23 12:00 36.7       


 


2/5/23 09:37        30





Capillary Refill : Less Than 3 Seconds


General Appearance:  No Apparent Distress, WD/WN, Chronically ill, Other 

(Sedated and intubated)


Respiratory:  Lungs Clear, Normal Breath Sounds





Results/Procedures


Lab


Laboratory Tests


2/5/23 02:57








Patient resulted labs reviewed.





Assessment/Plan


Assessment and Plan


Assess & Plan/Chief Complaint


Assessment:


Cardiac arrest


Respiratory arrest


Severe COPD


Smoker


Alcohol use


Severe decline in status





Plan:


Suspected anoxic brain injury


Family agrees with DNR





Critical Care


Ventilator Management











CANDIS WONG DO                 Feb 5, 2023 07:39

## 2023-02-06 VITALS — SYSTOLIC BLOOD PRESSURE: 151 MMHG | DIASTOLIC BLOOD PRESSURE: 78 MMHG

## 2023-02-06 VITALS — SYSTOLIC BLOOD PRESSURE: 137 MMHG | DIASTOLIC BLOOD PRESSURE: 70 MMHG

## 2023-02-06 VITALS — DIASTOLIC BLOOD PRESSURE: 77 MMHG | SYSTOLIC BLOOD PRESSURE: 143 MMHG

## 2023-02-06 VITALS — DIASTOLIC BLOOD PRESSURE: 81 MMHG | SYSTOLIC BLOOD PRESSURE: 167 MMHG

## 2023-02-06 VITALS — DIASTOLIC BLOOD PRESSURE: 83 MMHG | SYSTOLIC BLOOD PRESSURE: 168 MMHG

## 2023-02-06 VITALS — SYSTOLIC BLOOD PRESSURE: 157 MMHG | DIASTOLIC BLOOD PRESSURE: 85 MMHG

## 2023-02-06 LAB
ALBUMIN SERPL-MCNC: 2.9 GM/DL (ref 3.2–4.5)
ALP SERPL-CCNC: 52 U/L (ref 40–136)
ALT SERPL-CCNC: 23 U/L (ref 0–55)
BASOPHILS # BLD AUTO: 0 10^3/UL (ref 0–0.1)
BASOPHILS NFR BLD AUTO: 0 % (ref 0–10)
BILIRUB SERPL-MCNC: 0.5 MG/DL (ref 0.1–1)
BUN/CREAT SERPL: 25
CALCIUM SERPL-MCNC: 8.1 MG/DL (ref 8.5–10.1)
CHLORIDE SERPL-SCNC: 106 MMOL/L (ref 98–107)
CO2 SERPL-SCNC: 25 MMOL/L (ref 21–32)
CREAT SERPL-MCNC: 0.69 MG/DL (ref 0.6–1.3)
EOSINOPHIL # BLD AUTO: 0 10^3/UL (ref 0–0.3)
EOSINOPHIL NFR BLD AUTO: 0 % (ref 0–10)
GFR SERPLBLD BASED ON 1.73 SQ M-ARVRAT: 105 ML/MIN
GLUCOSE SERPL-MCNC: 153 MG/DL (ref 70–105)
HCT VFR BLD CALC: 31 % (ref 40–54)
HGB BLD-MCNC: 10.5 G/DL (ref 13.3–17.7)
LYMPHOCYTES # BLD AUTO: 0.5 10^3/UL (ref 1–4)
LYMPHOCYTES NFR BLD AUTO: 4 % (ref 12–44)
MAGNESIUM SERPL-MCNC: 2.1 MG/DL (ref 1.6–2.4)
MANUAL DIFFERENTIAL PERFORMED BLD QL: NO
MCH RBC QN AUTO: 33 PG (ref 25–34)
MCHC RBC AUTO-ENTMCNC: 34 G/DL (ref 32–36)
MCV RBC AUTO: 95 FL (ref 80–99)
MONOCYTES # BLD AUTO: 0.5 10^3/UL (ref 0–1)
MONOCYTES NFR BLD AUTO: 4 % (ref 0–12)
NEUTROPHILS # BLD AUTO: 10 10^3/UL (ref 1.8–7.8)
NEUTROPHILS NFR BLD AUTO: 91 % (ref 42–75)
PHOSPHATE SERPL-MCNC: 3.3 MG/DL (ref 2.3–4.7)
PLATELET # BLD: 123 10^3/UL (ref 130–400)
PMV BLD AUTO: 10.3 FL (ref 9–12.2)
POTASSIUM SERPL-SCNC: 3.2 MMOL/L (ref 3.6–5)
PROT SERPL-MCNC: 5.4 GM/DL (ref 6.4–8.2)
SODIUM SERPL-SCNC: 142 MMOL/L (ref 135–145)
WBC # BLD AUTO: 11 10^3/UL (ref 4.3–11)

## 2023-02-06 RX ADMIN — PROPOFOL SCH MLS/HR: 10 INJECTION, EMULSION INTRAVENOUS at 13:46

## 2023-02-06 RX ADMIN — INSULIN ASPART SCH UNIT: 100 INJECTION, SOLUTION INTRAVENOUS; SUBCUTANEOUS at 05:15

## 2023-02-06 RX ADMIN — POTASSIUM CHLORIDE SCH MLS/HR: 200 INJECTION, SOLUTION INTRAVENOUS at 06:53

## 2023-02-06 RX ADMIN — SODIUM CHLORIDE SCH MLS/HR: 900 INJECTION INTRAVENOUS at 05:51

## 2023-02-06 RX ADMIN — ALBUTEROL SULFATE SCH MG: 2.5 SOLUTION RESPIRATORY (INHALATION) at 10:48

## 2023-02-06 RX ADMIN — POTASSIUM CHLORIDE SCH MLS/HR: 200 INJECTION, SOLUTION INTRAVENOUS at 08:04

## 2023-02-06 RX ADMIN — FUROSEMIDE SCH MG: 10 INJECTION, SOLUTION INTRAVENOUS at 08:05

## 2023-02-06 RX ADMIN — LORAZEPAM SCH MLS/HR: 2 INJECTION INTRAMUSCULAR; INTRAVENOUS at 14:33

## 2023-02-06 RX ADMIN — METHYLPREDNISOLONE SODIUM SUCCINATE SCH MG: 125 INJECTION, POWDER, FOR SOLUTION INTRAMUSCULAR; INTRAVENOUS at 23:25

## 2023-02-06 RX ADMIN — ALBUTEROL SULFATE SCH MG: 2.5 SOLUTION RESPIRATORY (INHALATION) at 06:53

## 2023-02-06 RX ADMIN — Medication SCH MLS/HR: at 02:31

## 2023-02-06 RX ADMIN — PANTOPRAZOLE SODIUM SCH MG: 40 INJECTION, POWDER, FOR SOLUTION INTRAVENOUS at 08:05

## 2023-02-06 RX ADMIN — SODIUM CHLORIDE, SODIUM LACTATE, POTASSIUM CHLORIDE, AND CALCIUM CHLORIDE SCH MLS/HR: 600; 310; 30; 20 INJECTION, SOLUTION INTRAVENOUS at 21:03

## 2023-02-06 RX ADMIN — METHYLPREDNISOLONE SODIUM SUCCINATE SCH MG: 125 INJECTION, POWDER, FOR SOLUTION INTRAMUSCULAR; INTRAVENOUS at 17:40

## 2023-02-06 RX ADMIN — ASPIRIN 81 MG CHEWABLE TABLET SCH MG: 81 TABLET CHEWABLE at 08:05

## 2023-02-06 RX ADMIN — Medication SCH MLS/HR: at 21:24

## 2023-02-06 RX ADMIN — ENOXAPARIN SODIUM SCH MG: 100 INJECTION SUBCUTANEOUS at 09:30

## 2023-02-06 RX ADMIN — METOPROLOL TARTRATE SCH MG: 25 TABLET, FILM COATED ORAL at 08:05

## 2023-02-06 RX ADMIN — METHYLPREDNISOLONE SODIUM SUCCINATE SCH MG: 125 INJECTION, POWDER, FOR SOLUTION INTRAMUSCULAR; INTRAVENOUS at 12:06

## 2023-02-06 RX ADMIN — INSULIN ASPART SCH UNIT: 100 INJECTION, SOLUTION INTRAVENOUS; SUBCUTANEOUS at 23:31

## 2023-02-06 RX ADMIN — CLOPIDOGREL BISULFATE SCH MG: 75 TABLET, FILM COATED ORAL at 08:05

## 2023-02-06 RX ADMIN — LORAZEPAM SCH MLS/HR: 2 INJECTION INTRAMUSCULAR; INTRAVENOUS at 03:39

## 2023-02-06 RX ADMIN — SODIUM CHLORIDE SCH MLS/HR: 900 INJECTION INTRAVENOUS at 17:40

## 2023-02-06 RX ADMIN — POTASSIUM CHLORIDE SCH MLS/HR: 200 INJECTION, SOLUTION INTRAVENOUS at 06:39

## 2023-02-06 RX ADMIN — INSULIN ASPART SCH UNIT: 100 INJECTION, SOLUTION INTRAVENOUS; SUBCUTANEOUS at 18:43

## 2023-02-06 RX ADMIN — METHYLPREDNISOLONE SODIUM SUCCINATE SCH MG: 125 INJECTION, POWDER, FOR SOLUTION INTRAMUSCULAR; INTRAVENOUS at 05:51

## 2023-02-06 RX ADMIN — INSULIN ASPART SCH UNIT: 100 INJECTION, SOLUTION INTRAVENOUS; SUBCUTANEOUS at 12:06

## 2023-02-06 RX ADMIN — SODIUM CHLORIDE SCH MLS/HR: 900 INJECTION INTRAVENOUS at 12:06

## 2023-02-06 RX ADMIN — MAGNESIUM SULFATE IN DEXTROSE SCH MLS/HR: 10 INJECTION, SOLUTION INTRAVENOUS at 06:39

## 2023-02-06 RX ADMIN — ALBUTEROL SULFATE SCH MG: 2.5 SOLUTION RESPIRATORY (INHALATION) at 14:21

## 2023-02-06 RX ADMIN — ALBUTEROL SULFATE SCH MG: 2.5 SOLUTION RESPIRATORY (INHALATION) at 22:53

## 2023-02-06 RX ADMIN — SODIUM CHLORIDE SCH MLS/HR: 900 INJECTION INTRAVENOUS at 23:26

## 2023-02-06 RX ADMIN — POTASSIUM CHLORIDE SCH MEQ: 1500 TABLET, EXTENDED RELEASE ORAL at 06:46

## 2023-02-06 RX ADMIN — ALBUTEROL SULFATE SCH MG: 2.5 SOLUTION RESPIRATORY (INHALATION) at 18:44

## 2023-02-06 RX ADMIN — ALBUTEROL SULFATE SCH MG: 2.5 SOLUTION RESPIRATORY (INHALATION) at 02:23

## 2023-02-06 RX ADMIN — PROPOFOL SCH MLS/HR: 10 INJECTION, EMULSION INTRAVENOUS at 01:06

## 2023-02-06 NOTE — PHYSICAL THERAPY PROGRESS NOTE
Therapy Progress Note


PT to dismiss patient from services at this time due to declined in status.  PT 

will require new orders when patient is deemed medically stable and able to 

actively participate with skilled therapy.











MARICEL WILSON PT               Feb 6, 2023 07:21

## 2023-02-06 NOTE — PROGRESS NOTE - HOSPITALIST
PAMELA WORLEY 2/6/23 1111:


Subjective


HPI/CC On Admission


Date Seen by Provider:  Feb 6, 2023


Time Seen by Provider:  08:25


Cardiac Arrest / Respiratory Arrest


Subjective/Events-last exam


Brett Gamez is a 63 yo M who was intubated in  ED on 2/3 and subsequently

transferred to ICU on mechanical ventilation. Central line was placed by Dr. Blevins on 2/3. No family is present in the room this morning. He is a DNR status

per family. PT/OT have been dismissed. Per RN, the patient has no gag reflex, 

pupils are sluggish, and he does not respond to painful stimuli. Daughter called

this morning asking if/when he might regain neurological function.





Review of Systems


Unable to obtain ROS. Patient is intubated and on ventilator.





Objective


Exam


Vital Signs





Vital Signs








  Date Time  Temp Pulse Resp B/P (MAP) Pulse Ox O2 Delivery O2 Flow Rate FiO2


 


2/6/23 10:48  93 18  94   25


 


2/6/23 10:00    157/85 (109)  Mechanical Ventilator 25.00 


 


2/6/23 08:00 37.0       





Capillary Refill : Less Than 3 Seconds


General Appearance:  No Apparent Distress, WD/WN, Chronically ill, Other 

(Sedated and Intubated)


Respiratory:  Lungs Clear, Normal Breath Sounds





Results/Procedures


Lab


Laboratory Tests


2/6/23 05:00








Patient resulted labs reviewed.





Ventilator Settings on 2/6/23


Tidal Volume: 550


Rate: 14


PEEP: 5.0


FiO2: 25%





ABG on 2/5/23


pH: 7.52


pCO2: 32


pO2: 80





Assessment/Plan


Assessment and Plan


Assess & Plan/Chief Complaint


Assessment:


Presumed Anoxic Brain Injury likely unrecoverable


Cardiac arrest


Respiratory arrest


Severe COPD


Smoker


Alcohol use


Severe decline in status





Plan:


Confer with EICU physician for two physician statement of futility


Family agrees with DNR


Critical Care:  Ventilator Management





TRACI WONG DO 2/7/23 0456:


Supervisory-Addendum Brief


Verification & Attestation


Participated in pt care:  history, MDM, physical


Personally performed:  exam, history, MDM, supervision of care


Care discussed with:  Medical Student


Procedures:  n/a


Results interpretation:  Verified all documentation


Verification and Attestation of Medical Student E/M Service





A medical student performed and documented this service in my presence. I 

reviewed and verified all information documented by the medical student and made

modifications to such information, when appropriate. I personally performed the 

physical exam and medical decision making. 





 Traci Wong, Feb 7, 2023,04:56











PAMELA WORLEY               Feb 6, 2023 11:11


TRACI WONG DO                 Feb 7, 2023 04:56

## 2023-02-06 NOTE — TELE-ICU PROGRESS NOTE
Subjective


Date Seen by a Provider:  Feb 6, 2023


Time Seen by a Provider:  12:27


Subjective/Events-last exam


PT WITH WITNESSED CARDIAC ARREST AT HOME WITH ROSC AFTER CPR. CURRENTLY 

INTUBATED, ON VENT. NO SEDATION.  PER RN NO GAG OR COUGH REFLEX. HAS MINIMAL 

CORNEAL REFLEX. UNRESPONSIVE. NO CLINICAL SEIZURES BUT ON KEPPRA. DNR, NO 

PRESSORS.


Review of Systems


ROS PER RN





Sepsis Event


Evaluation


Height, Weight, BMI


Height: '"


Weight: lbs. oz. kg; 23.50 BMI


Method:





Exam


Exam


Patient acknowledged, consented, and participated in this virtual visit which 

was conducted using real time audio/video


Vital Signs








  Date Time  Temp Pulse Resp B/P (MAP) Pulse Ox O2 Delivery O2 Flow Rate FiO2


 


2/6/23 12:00 36.8       


 


2/6/23 11:00  93 16 164/82 (109) 95 Mechanical Ventilator 25.00 


 


2/6/23 10:48  93 18  94   25


 


2/6/23 10:00  98 27 157/85 (109) 94 Mechanical Ventilator 25.00 


 


2/6/23 09:38        25


 


2/6/23 09:00  98 26 134/74 (94) 92 Mechanical Ventilator 25.00 


 


2/6/23 08:00  95 26 154/80 (104) 93 Mechanical Ventilator 25.00 


 


2/6/23 08:00 37.0       


 


2/6/23 07:45     94 Mechanical Ventilator  25


 


2/6/23 07:01  92      


 


2/6/23 07:00  89 22 146/79 (101) 98 Mechanical Ventilator 25.00 


 


2/6/23 06:53  91 24  95   25


 


2/6/23 06:00  97 20 150/83 (112) 96   


 


2/6/23 05:48        25


 


2/6/23 05:00  96 19 138/76 (100) 95   


 


2/6/23 04:00     93 Mechanical Ventilator  25


 


2/6/23 04:00 37.3 100 19 140/73 (95) 93 Mechanical Ventilator 25.00 


 


2/6/23 03:00  100 18 117/68 (95) 91 Mechanical Ventilator 25.00 


 


2/6/23 03:00  100 20 117/68 (95) 92 Mechanical Ventilator  


 


2/6/23 02:23  99 19  92   25


 


2/6/23 02:00  98 18 137/70 (103) 93 Mechanical Ventilator  


 


2/6/23 02:00        25


 


2/6/23 01:00  102      


 


2/6/23 01:00  101 18 146/74 (108) 93   


 


2/6/23 00:00 37.4 101 19 133/71 (104) 95 Mechanical Ventilator 25.00 


 


2/6/23 00:00     97 Mechanical Ventilator  25


 


2/5/23 23:59        25


 


2/5/23 23:00  101 18 129/73 (93) 92   


 


2/5/23 22:37  101 17  95   25


 


2/5/23 22:00  112 18 137/70 (98) 95   


 


2/5/23 21:37 38.2 96   95   25


 


2/5/23 21:30  117  140/70 (94) 95   


 


2/5/23 21:00  120 20 133/72 (96) 95   


 


2/5/23 20:30    125/66 (82)    


 


2/5/23 20:01  120  155/79 (111) 93   


 


2/5/23 20:00        25


 


2/5/23 20:00  121 19  92   


 


2/5/23 19:45  114 19 152/74 (112) 91 Mechanical Ventilator 25.00 


 


2/5/23 19:44 38.2       


 


2/5/23 19:40     97 Mechanical Ventilator  25


 


2/5/23 19:30  112  158/79 (107) 92   


 


2/5/23 19:15  109  163/82 (109) 93   


 


2/5/23 19:00  89 21 156/75 (108) 96   


 


2/5/23 19:00  93      


 


2/5/23 18:54  96 14  95   25


 


2/5/23 18:00  96 14 162/76 (110) 95 Mechanical Ventilator 25.00 


 


2/5/23 17:37        25


 


2/5/23 17:00  93 14 158/74 (95) 96 Mechanical Ventilator 25.00 


 


2/5/23 16:00  92 14 150/74 (99) 95 Mechanical Ventilator 25.00 


 


2/5/23 16:00     97 Mechanical Ventilator  30


 


2/5/23 15:18 37.4       


 


2/5/23 15:00  92 14 151/73 (104) 96 Mechanical Ventilator 25.00 


 


2/5/23 14:45  93 14  97   25


 


2/5/23 14:00  92 15 152/75 (93) 96 Mechanical Ventilator 25.00 


 


2/5/23 13:37        30


 


2/5/23 13:00  93 23 149/77 (105) 96 Mechanical Ventilator 25.00 


 


2/5/23 12:51  92      














I & O0 


 


 2/6/23





 06:59


 


Intake Total 1340 ml


 


Output Total 1510 ml


 


Balance -170 ml








Height & Weight


Height: '"


Weight: lbs. oz. kg; 23.50 BMI


Method:


General Appearance:  No Apparent Distress, WD/WN, Chronically ill, Other 

(Sedated and Intubated)


HEENT:  Other (Pupils sluggishly reactive, endotracheal tube in place 21 at the 

lip.)


Neck:  Non Tender, Supple


Respiratory:  Lungs Clear, Normal Breath Sounds


Cardiovascular:  Extra Beats, Tachycardia


Capillary Refill:  Less Than 3 Seconds


Peripheral Pulses:  2+ Radial Pulses (R), 2+ Radial Pulses (L)


Gastrointestinal:  soft, no organomegaly


Neurologic/Psychiatric:  Other (Unresponsive)


Other comments


PE PER RN





Results


Lab


Laboratory Tests


2/5/23 02:57








2/6/23 05:00











Assessment/Plan


Assessment/Plan


1. S/P CARDIAC ARREST. INITIAL RHYTHM NOT KNOWN.


2. AC. HYPOXIC RESPIRATORY FAILURE 2TO ABOVE.


3. SVERE HYPOXIC/ANOXIC BRAIN INJURY.


4. RECENT HX OF PNEUMONIA.





PLAN.


1. CONTINUE CURRENT VENT SETTINGS.


2. DNR, DNI, NO PRESSORS.


3.AWAIT FOR FAMILY TO MAKE DECISIONS REGARDING GOALS OF  CARE SUCH AS COMFORT 

CARE.


4.WILL TALK TO FAMILY WHEN ARRIVES.


REVIEWED WITH RN


PROGNOSIS GRAVE.


Critical Care:  Ventilator Management


Time spent with patient (mins):  30











ELISHA HERBERT MD                 Feb 6, 2023 12:30

## 2023-02-06 NOTE — OCC THERAPY PROGRESS NOTE
Therapy Progress Note


D/c from OT services at this time.











FRANSISCO MURRAY                Feb 6, 2023 06:42

## 2023-02-06 NOTE — WOUND CARE ASSESSMENT
Wound Care Assessment


Date Seen by Provider:  Feb 6, 2023


Time Seen by Provider:  14:55


Chief Complaint


Sacral ulcer


HPI


This 62 year old gentleman was found down following cardiac/respiratory arrest. 

He was recently admitted to the hospital with pneumonia (and was intubated) but 

subsequently was extubated and returned home. He does have underlying COPD, h/o 

CHF with preserved EF, CAD and h/o CVA. His labs reveal PEM, anemia and elevated

troponin suspicious for NSTEMI vs. anoxic injury. He has been tried off sedation

without improvement in neurologic status. He does have a stage 2 

pressure/moisture related injury to his sacrum. I suspect this initiated with 

his last hospitalization. Plan for barrier ointment, bordered foam and off 

loading measures for protection.


Past Medical History:  Admits Heart Disease, Admits Myocardial Infarction


s/p cardiorespiratory arrest, COPD, CAD, CVA, CHF, PEM, anemia, anoxic brain 

injury


Smoking Status:  Current Everyday Smoker


Review of Systems


Other systems


Unable to obtain ROS due to intubation and neurologic status.





Exam





Vital Signs








  Date Time  Temp Pulse Resp B/P (MAP) Pulse Ox O2 Delivery O2 Flow Rate FiO2


 


2/6/23 14:22  89 16  93   25


 


2/6/23 14:00    154/76 (102)  Mechanical Ventilator 25.00 


 


2/6/23 12:00 36.8       





Capillary Refill : Less Than 3 Seconds


General Appearance:  WD/WN, no apparent distress, other (intubated)


Neurologic/Psychiatric:  other (intubated and unresponsive)


Skin Problem Location:  other (sacrum)


Skin Character:  erythema


Wound assessment: The epithelialization is none. There is no tunneling or 

undermining. Drainage is large and serous. The ulcer is partial thickness. 

3.5x3.5x0.1cm to open area and 7x7cm to area with non-blanching erythema





Results


Laboratory Tests


2/5/23 17:53: Glucometer 121H


2/5/23 23:33: Glucometer 140H


2/6/23 05:00: 


White Blood Count 11.0, Red Blood Count 3.23L, Hemoglobin 10.5L, Hematocrit 31L,

Mean Corpuscular Volume 95, Mean Corpuscular Hemoglobin 33, Mean Corpuscular 

Hemoglobin Concent 34, Red Cell Distribution Width 13.0, Platelet Count 123L, 

Mean Platelet Volume 10.3, Immature Granulocyte % (Auto) 1, Neutrophils (%) 

(Auto) 91H, Lymphocytes (%) (Auto) 4L, Monocytes (%) (Auto) 4, Eosinophils (%) 

(Auto) 0, Basophils (%) (Auto) 0, Neutrophils # (Auto) 10.0H, Lymphocytes # 

(Auto) 0.5L, Monocytes # (Auto) 0.5, Eosinophils # (Auto) 0.0, Basophils # 

(Auto) 0.0, Immature Granulocyte # (Auto) 0.1, Percent Immature Platelet F

raction 3.9, Sodium Level 142, Potassium Level 3.2L, Chloride Level 106, Carbon 

Dioxide Level 25, Anion Gap 11, Blood Urea Nitrogen 17, Creatinine 0.69, Estimat

Glomerular Filtration Rate 105, BUN/Creatinine Ratio 25, Glucose Level 153H, 

Calcium Level 8.1L, Corrected Calcium 9.0, Phosphorus Level 3.3, Magnesium Level

2.1, Total Bilirubin 0.5, Aspartate Amino Transf (AST/SGOT) 42H, Alanine 

Aminotransferase (ALT/SGPT) 23, Alkaline Phosphatase 52, Total Protein 5.4L, 

Albumin 2.9L


2/6/23 05:05: Glucometer 152H


2/6/23 10:43: B-Type Natriuretic Peptide 366.8H


2/6/23 12:02: Glucometer 136H





Microbiology


2/3/23 MRSA Screen - Final, Complete


         MRSA not isolated


2/3/23 Urine Culture - Final, Complete


         NO GROWTH


2/3/23 Blood Culture - Preliminary, Resulted


         No growth





Assessment/Plan/Dx


Assessment:





1. Stage 2 pressure ulcer sacrum


2. Irritant dermatitis from incontinence


3. PEM


4. Immobility


5. COPD


6. Suspected anoxic brain injury





Plan:





1. Cleanse area daily and apply barrier ointment with bordered foam dressing


2. Change daily and prn


3. Defer decisions on palliation/comfort measures to primary team. Will support 

as indicated during his stay at our facility


4. Off loading measures











SONIA PAULSON MD             Feb 6, 2023 15:02

## 2023-02-06 NOTE — CARDIOLOGY PROGRESS NOTE
Subjective


Date Seen by Provider:  Feb 6, 2023


Time Seen by Provider:  08:00


Subjective/Events-last exam


No acute events overnight. Pt now DNR. No purposeful movement. No gag reflex, no

withdrawal to pain





Objective-Cardiology


Exam


Last Set of Vital Signs





Vital Signs








 2/6/23 2/6/23 2/6/23





 08:00 10:00 10:48


 


Temp 37.0  


 


Pulse   93


 


Resp   18


 


B/P (MAP)  157/85 (109) 


 


Pulse Ox   94


 


O2 Delivery  Mechanical Ventilator 


 


O2 Flow Rate  25.00 


 


FiO2   25








I&O











Intake and Output 


 


 2/6/23





 00:00


 


Intake Total 2450 ml


 


Output Total 1600 ml


 


Balance 850 ml


 


 


 


Intake Oral 0 ml


 


IV Total 2260 ml


 


Other 190 ml


 


Output Urine Total 1400 ml


 


Gastric Drainage Total 200 ml








General:  Other (intubated sedated)


Lungs:  Clear to Auscultation, Normal Air Movement


Heart:  Regular Rate, Normal S1, Normal S2, No Murmurs


Extremities:  No Clubbing, No Cyanosis


Skin:  No Rashes, No Significant Lesion





Results


Lab


Laboratory Tests


2/6/23 05:00














A/P-Cardiology


Assessment/Plan


Assessment and Plan: 





## Cardioresp arrest on 2-3-22 s/p DCCVx 1 by EMS; unknown time down- TTE with 

EF 55-60% and no Regional WMA. 


- pt now without purposeful movement; concern for myoclonic jerking vs seizures


- we will cont to follow along but no indication for cardiac catheterization 

particularly in light of poor neurological recovery


- for now, cont asa, plavix and metop





## Post arrest ECG without any ST elevation


- elevated troponin: NSTEMI vs type 2 MI likely due to transient anoxia





## Aute respiratory failure requiring ventilation - likely multi-factoria


- no vent over breathing with sedation holiday; concern for poor neurologic 

status


- cont to monitor





## Coronary artery disease s/p PCI to mLAD in June 6, 2022 by Dr. Posey, 

calcified coronary system with significant stenosis in the mid LAD, status post 

stenting using alma point stent 3 x 23 mm expanded to 3.1 mm


- Continue on dual antiplatelet therapy





## HTn: BPs ranging from 110-150s systolic and HR up to 100s at times. 


- increase metop to 25 bid





## Anemia: Hgb from 13.6 to 10.5; cont to monitor





## Aute on Chronic diastolic CHF


- Echocardigoram of 1-20-23 showed LVEF 60-65%.  Grade 2 diastolic dysfunction. 

Mod MR.  Mild to mod aortic stenosis with mod AoR.  PASP 45-50 mmHg


- Echo on 2-3-23 (post-code): LVEF 55-60%, mild to mod AS, mild AI; no regional 

wall motion abnormalities were noted








## Dispo: 


- family discussion necessary to define goals of care (GOC) at this point- poor 

neurologic recovery given no signs of neurologic activity despite. Pt has been 

off of sedation for > 24 hours without any positive signs of neurologic deepak

very. 


- hold on any cardiac intervention at this time.


- will consider signing off once GOC defined











KLAUDIA MODI MD           Feb 6, 2023 11:31

## 2023-02-07 VITALS — SYSTOLIC BLOOD PRESSURE: 159 MMHG | DIASTOLIC BLOOD PRESSURE: 84 MMHG

## 2023-02-07 VITALS — DIASTOLIC BLOOD PRESSURE: 87 MMHG | SYSTOLIC BLOOD PRESSURE: 162 MMHG

## 2023-02-07 VITALS — SYSTOLIC BLOOD PRESSURE: 129 MMHG | DIASTOLIC BLOOD PRESSURE: 90 MMHG

## 2023-02-07 VITALS — SYSTOLIC BLOOD PRESSURE: 169 MMHG | DIASTOLIC BLOOD PRESSURE: 77 MMHG

## 2023-02-07 VITALS — DIASTOLIC BLOOD PRESSURE: 83 MMHG | SYSTOLIC BLOOD PRESSURE: 180 MMHG

## 2023-02-07 VITALS — DIASTOLIC BLOOD PRESSURE: 86 MMHG | SYSTOLIC BLOOD PRESSURE: 172 MMHG

## 2023-02-07 LAB
ALBUMIN SERPL-MCNC: 3.1 GM/DL (ref 3.2–4.5)
ALP SERPL-CCNC: 51 U/L (ref 40–136)
ALT SERPL-CCNC: 25 U/L (ref 0–55)
ARTERIAL PATENCY WRIST A: (no result)
BASE EXCESS STD BLDA CALC-SCNC: 5.1 MMOL/L (ref -2.5–2.5)
BASOPHILS # BLD AUTO: 0 10^3/UL (ref 0–0.1)
BASOPHILS NFR BLD AUTO: 0 % (ref 0–10)
BDY SITE: (no result)
BILIRUB SERPL-MCNC: 0.6 MG/DL (ref 0.1–1)
BODY TEMPERATURE: 37.3
BUN/CREAT SERPL: 31
CALCIUM SERPL-MCNC: 8.2 MG/DL (ref 8.5–10.1)
CHLORIDE SERPL-SCNC: 107 MMOL/L (ref 98–107)
CO2 BLDA CALC-SCNC: 29.7 MMOL/L (ref 21–31)
CO2 SERPL-SCNC: 25 MMOL/L (ref 21–32)
CREAT SERPL-MCNC: 0.65 MG/DL (ref 0.6–1.3)
EOSINOPHIL # BLD AUTO: 0 10^3/UL (ref 0–0.3)
EOSINOPHIL NFR BLD AUTO: 0 % (ref 0–10)
GFR SERPLBLD BASED ON 1.73 SQ M-ARVRAT: 107 ML/MIN
GLUCOSE SERPL-MCNC: 126 MG/DL (ref 70–105)
HCT VFR BLD CALC: 32 % (ref 40–54)
HGB BLD-MCNC: 10.8 G/DL (ref 13.3–17.7)
INHALED O2 FLOW RATE: (no result) L/MIN
LYMPHOCYTES # BLD AUTO: 0.4 10^3/UL (ref 1–4)
LYMPHOCYTES NFR BLD AUTO: 5 % (ref 12–44)
MAGNESIUM SERPL-MCNC: 2.2 MG/DL (ref 1.6–2.4)
MANUAL DIFFERENTIAL PERFORMED BLD QL: NO
MCH RBC QN AUTO: 32 PG (ref 25–34)
MCHC RBC AUTO-ENTMCNC: 34 G/DL (ref 32–36)
MCV RBC AUTO: 96 FL (ref 80–99)
MONOCYTES # BLD AUTO: 0.3 10^3/UL (ref 0–1)
MONOCYTES NFR BLD AUTO: 3 % (ref 0–12)
NEUTROPHILS # BLD AUTO: 7.6 10^3/UL (ref 1.8–7.8)
NEUTROPHILS NFR BLD AUTO: 91 % (ref 42–75)
PCO2 BLDA: 38 MMHG (ref 35–45)
PH BLDA: 7.49 [PH] (ref 7.37–7.43)
PHOSPHATE SERPL-MCNC: 3.1 MG/DL (ref 2.3–4.7)
PLATELET # BLD: 117 10^3/UL (ref 130–400)
PMV BLD AUTO: 10.4 FL (ref 9–12.2)
PO2 BLDA: 46 MMHG (ref 79–93)
POTASSIUM SERPL-SCNC: 3.4 MMOL/L (ref 3.6–5)
PROT SERPL-MCNC: 5.5 GM/DL (ref 6.4–8.2)
SAO2 % BLDA FROM PO2: 80 % (ref 94–100)
SODIUM SERPL-SCNC: 142 MMOL/L (ref 135–145)
VENTILATION MODE VENT: YES
WBC # BLD AUTO: 8.3 10^3/UL (ref 4.3–11)

## 2023-02-07 RX ADMIN — PROPOFOL SCH MLS/HR: 10 INJECTION, EMULSION INTRAVENOUS at 15:23

## 2023-02-07 RX ADMIN — METOPROLOL TARTRATE SCH MG: 50 TABLET, FILM COATED ORAL at 09:32

## 2023-02-07 RX ADMIN — CLOPIDOGREL BISULFATE SCH MG: 75 TABLET, FILM COATED ORAL at 09:32

## 2023-02-07 RX ADMIN — INSULIN ASPART SCH UNIT: 100 INJECTION, SOLUTION INTRAVENOUS; SUBCUTANEOUS at 12:28

## 2023-02-07 RX ADMIN — LORAZEPAM SCH MLS/HR: 2 INJECTION INTRAMUSCULAR; INTRAVENOUS at 00:33

## 2023-02-07 RX ADMIN — SODIUM CHLORIDE, SODIUM LACTATE, POTASSIUM CHLORIDE, AND CALCIUM CHLORIDE SCH MLS/HR: 600; 310; 30; 20 INJECTION, SOLUTION INTRAVENOUS at 16:53

## 2023-02-07 RX ADMIN — METHYLPREDNISOLONE SODIUM SUCCINATE SCH MG: 125 INJECTION, POWDER, FOR SOLUTION INTRAMUSCULAR; INTRAVENOUS at 05:38

## 2023-02-07 RX ADMIN — SODIUM CHLORIDE SCH MLS/HR: 900 INJECTION INTRAVENOUS at 23:44

## 2023-02-07 RX ADMIN — METHYLPREDNISOLONE SODIUM SUCCINATE SCH MG: 125 INJECTION, POWDER, FOR SOLUTION INTRAMUSCULAR; INTRAVENOUS at 17:02

## 2023-02-07 RX ADMIN — HYDRALAZINE HYDROCHLORIDE PRN MG: 20 INJECTION INTRAMUSCULAR; INTRAVENOUS at 18:23

## 2023-02-07 RX ADMIN — POTASSIUM CHLORIDE SCH MLS/HR: 200 INJECTION, SOLUTION INTRAVENOUS at 05:38

## 2023-02-07 RX ADMIN — INSULIN ASPART SCH UNIT: 100 INJECTION, SOLUTION INTRAVENOUS; SUBCUTANEOUS at 04:30

## 2023-02-07 RX ADMIN — ALBUTEROL SULFATE SCH MG: 2.5 SOLUTION RESPIRATORY (INHALATION) at 14:17

## 2023-02-07 RX ADMIN — SODIUM CHLORIDE SCH MLS/HR: 900 INJECTION INTRAVENOUS at 05:38

## 2023-02-07 RX ADMIN — ASPIRIN 81 MG CHEWABLE TABLET SCH MG: 81 TABLET CHEWABLE at 09:32

## 2023-02-07 RX ADMIN — Medication SCH MLS/HR: at 16:53

## 2023-02-07 RX ADMIN — POTASSIUM CHLORIDE SCH MLS/HR: 200 INJECTION, SOLUTION INTRAVENOUS at 04:37

## 2023-02-07 RX ADMIN — PANTOPRAZOLE SODIUM SCH MG: 40 INJECTION, POWDER, FOR SOLUTION INTRAVENOUS at 09:32

## 2023-02-07 RX ADMIN — SODIUM CHLORIDE SCH MLS/HR: 900 INJECTION INTRAVENOUS at 17:03

## 2023-02-07 RX ADMIN — POTASSIUM CHLORIDE SCH MEQ: 1500 TABLET, EXTENDED RELEASE ORAL at 04:29

## 2023-02-07 RX ADMIN — ALBUTEROL SULFATE SCH MG: 2.5 SOLUTION RESPIRATORY (INHALATION) at 10:18

## 2023-02-07 RX ADMIN — SODIUM CHLORIDE SCH MLS/HR: 900 INJECTION INTRAVENOUS at 12:27

## 2023-02-07 RX ADMIN — ALBUTEROL SULFATE SCH MG: 2.5 SOLUTION RESPIRATORY (INHALATION) at 22:19

## 2023-02-07 RX ADMIN — METHYLPREDNISOLONE SODIUM SUCCINATE SCH MG: 125 INJECTION, POWDER, FOR SOLUTION INTRAMUSCULAR; INTRAVENOUS at 12:27

## 2023-02-07 RX ADMIN — HYDRALAZINE HYDROCHLORIDE PRN MG: 20 INJECTION INTRAMUSCULAR; INTRAVENOUS at 19:57

## 2023-02-07 RX ADMIN — METOPROLOL TARTRATE SCH MG: 50 TABLET, FILM COATED ORAL at 20:00

## 2023-02-07 RX ADMIN — LORAZEPAM SCH MLS/HR: 2 INJECTION INTRAMUSCULAR; INTRAVENOUS at 11:41

## 2023-02-07 RX ADMIN — INSULIN ASPART SCH UNIT: 100 INJECTION, SOLUTION INTRAVENOUS; SUBCUTANEOUS at 23:54

## 2023-02-07 RX ADMIN — ALBUTEROL SULFATE SCH MG: 2.5 SOLUTION RESPIRATORY (INHALATION) at 02:30

## 2023-02-07 RX ADMIN — PROPOFOL SCH MLS/HR: 10 INJECTION, EMULSION INTRAVENOUS at 23:54

## 2023-02-07 RX ADMIN — MAGNESIUM SULFATE IN DEXTROSE SCH MLS/HR: 10 INJECTION, SOLUTION INTRAVENOUS at 04:29

## 2023-02-07 RX ADMIN — SODIUM CHLORIDE, SODIUM LACTATE, POTASSIUM CHLORIDE, AND CALCIUM CHLORIDE SCH MLS/HR: 600; 310; 30; 20 INJECTION, SOLUTION INTRAVENOUS at 21:59

## 2023-02-07 RX ADMIN — ENOXAPARIN SODIUM SCH MG: 100 INJECTION SUBCUTANEOUS at 09:48

## 2023-02-07 RX ADMIN — ALBUTEROL SULFATE SCH MG: 2.5 SOLUTION RESPIRATORY (INHALATION) at 18:59

## 2023-02-07 RX ADMIN — METHYLPREDNISOLONE SODIUM SUCCINATE SCH MG: 125 INJECTION, POWDER, FOR SOLUTION INTRAMUSCULAR; INTRAVENOUS at 23:44

## 2023-02-07 RX ADMIN — LORAZEPAM SCH MLS/HR: 2 INJECTION INTRAMUSCULAR; INTRAVENOUS at 19:56

## 2023-02-07 RX ADMIN — INSULIN ASPART SCH UNIT: 100 INJECTION, SOLUTION INTRAVENOUS; SUBCUTANEOUS at 17:54

## 2023-02-07 RX ADMIN — ALBUTEROL SULFATE SCH MG: 2.5 SOLUTION RESPIRATORY (INHALATION) at 06:40

## 2023-02-07 RX ADMIN — PROPOFOL SCH MLS/HR: 10 INJECTION, EMULSION INTRAVENOUS at 00:39

## 2023-02-07 NOTE — PROGRESS NOTE - HOSPITALIST
FAVIAN JACOBS 2/7/23 1422:


Subjective


HPI/CC On Admission


Date Seen by Provider:  Feb 7, 2023


Time Seen by Provider:  08:15


Cardiac Arrest / Respiratory Arrest


Subjective/Events-last exam


Upon follow-up for cardiac arrest, respiratory arrest, and presumed anoxic brain

injury, patient is laying supine with ventilator support.





Review of Systems


ROS unable to obtain. Patient is on ventilator support.





Objective


Exam


Vital Signs





Vital Signs








  Date Time  Temp Pulse Resp B/P (MAP) Pulse Ox O2 Delivery O2 Flow Rate FiO2


 


2/7/23 14:00  85 24 172/86 (114) 95 Mechanical Ventilator 25.00 


 


2/7/23 12:15        25


 


2/7/23 08:00 36.7       





Capillary Refill : Less Than 3 Seconds


General Appearance:  Other (On ventilator support)


HEENT:  No PERRL/EOMI


Respiratory:  Other (bilateral and equal chest rise and fall)


Cardiovascular:  Regular Rate, Rhythm


Gastrointestinal:  Soft


Skin:  Normal Color, Warm/Dry





Results/Procedures


Lab


Laboratory Tests


2/7/23 03:25








Patient resulted labs reviewed.





Assessment/Plan


Assessment and Plan


Assess & Plan/Chief Complaint


Presumed Anoxic Brain Injury, likely unrecoverable


Severe decline in status


Cardiac arrest


Respiratory arrest


   Confer with EICU physician for two physician statement of futility


   Has been off sedation for > 24 hours, no purposeful movements or signs of 

neurological recovery


   Family is considering neurology consult at [facility] (vs comfort care)


   Family agrees with DNR


Fluid/nutritional needs


   EICU requested to start enteral feeds





Severe COPD


Smoker


Alcohol use





TRACI WONG DO 2/8/23 0514:


Supervisory-Addendum Brief


Verification & Attestation


Participated in pt care:  history, MDM, physical


Personally performed:  exam, history, MDM, supervision of care


Care discussed with:  Medical Student


Procedures:  n/a


Results interpretation:  Verified all documentation


Verification and Attestation of Medical Student E/M Service





A medical student performed and documented this service in my presence. I 

reviewed and verified all information documented by the medical student and made

modifications to such information, when appropriate. I personally performed the 

physical exam and medical decision making. 





 Traci Wong, Feb 8, 2023,05:14











FAVIAN JACOBS              Feb 7, 2023 14:22


TRACI WONG DO                 Feb 8, 2023 05:14

## 2023-02-07 NOTE — TELE-ICU PROGRESS NOTE
Subjective


Date Seen by a Provider:  Feb 7, 2023


Time Seen by a Provider:  10:41


Subjective/Events-last exam


(Tele-ICU Physician , Progress Note )


Service provided via interactive audio and video telecommunications  E-CARE 

system to a patient admitted to ICU bed in [facility].


Patient is seen today due to persistent need of  ICU care





Available chart/ vitals / labs / Images reviewed


Video assessment done using   teleICU camera, rest of exam as per RN


Discussed with RN


Events overnight : 





Afebrile


hemodynamically stable


Respiratory - 25%


I/O =even





Drips:


Pressors- no








VENT SETTINGS and ABG   reviewed


NOT CANDIDATE   for SBTreviewed  possible contraindications including 

Cardiovascular Stability /Sedation Score / FI02/PEEP / ABG / CXR/ secretions 





Sedation, discussed with RN,   OFF SEDATION >36 h 








Consultants: perez


Hospital course:


RECENT 1/17/23 ICU stay with intubation for AECOP/PNA 2 days , extuv=bated --> 

puiln edema , VT 


2/3/23 - status post witnessed cardiac arrest at home , Intubated , possible SZ 

- started keppra


2/7 - AC 14 550 25% + 5 








A/P


status post witnessed cardiac arrest at home - ASLC 10 min till ROSC


- V. fib and was defibrillated once by paramedics - in afib in ER 


-- ECHO 1/20/23 EF 65% , gr II dst dsf 


- as per cards-asa, plavix and metop





Encephalopathy  post arrest 


- off seation 36 H , not follow commands , moves legs , 


- cont to  hold any sedation 


- CTH 2/3- no bleed 





Possible sx vs myoclonus 2/3


- Keppra bid 2/3





Acute resp failure ,


- Intubated 2/3/23 with arrest 


- cxr checked -  abg pending 








 leukocytosis


- probably reactive  NEG flu , covid) 


-( recent PNA  CAP ,  RML - cxr  IMPROVED, almost resolved 


- abx initiated  in ER empirically , sputum cx - follow 





AECOPD?


- nebs 


  decrease steroids  dose 





CAD


- s/p stenting  in past 


-EF reported 65% 2023








Pulm HTN 


- ECHO  RVSP 50 mmGg,


-decrease  IVF  ( elev lactate is post arrest , does not need sepsis boluses ) 








Nutrition 


- TF to start 





S/p CVA 








Lines :    L IJ  2/3   , (Central Line Necessity Reviewed)


Pizano: +


OG:


Nutrition: to start 


Analgesia:


Anxiety/ delirium








VTE Prophylaxis: lov 


Stress Ulcer Prophylaxis: ppi 











Plans in collaboration with   bedside consultants and IM MDs.


Discussed with RN to reach out if any questions or concerns


A total of 33 minutes of critical care time was devoted to this patient today, 

required to treat and/or prevent further deterioration of  critical care 

condition ( as above ) .





Sepsis Event


Evaluation


Height, Weight, BMI


Height: '"


Weight: lbs. oz. kg; 24.73 BMI


Method:





Exam


Exam


Patient acknowledged, consented, and participated in this virtual visit which 

was conducted using real time audio/video


Vital Signs








  Date Time  Temp Pulse Resp B/P (MAP) Pulse Ox O2 Delivery O2 Flow Rate FiO2


 


2/7/23 10:18  89 18  93   25


 


2/7/23 10:00  97 12 180/83 (115) 95 Mechanical Ventilator 25.00 


 


2/7/23 09:00  90 14 184/85 (118) 96 Mechanical Ventilator 25.00 


 


2/7/23 08:00  89 14 175/89 (117) 95 Mechanical Ventilator 25.00 


 


2/7/23 08:00 36.7       


 


2/7/23 07:30  99      


 


2/7/23 07:00  93 15 167/81 (109) 93 Mechanical Ventilator 25.00 


 


2/7/23 06:40  93 16  93   25


 


2/7/23 06:00  93 18 168/77 (107) 93 Mechanical Ventilator 25.00 


 


2/7/23 06:00  93 24 168/77 (107) 93 Mechanical Ventilator 25.00 


 


2/7/23 05:25        25


 


2/7/23 05:00  93 18 161/83 (126) 93 Mechanical Ventilator 25.00 


 


2/7/23 04:03     95   25


 


2/7/23 04:00  98 18 162/79 (119) 91 Mechanical Ventilator 25.00 


 


2/7/23 03:53 37.3       


 


2/7/23 03:00  94 18 161/84 (123) 91 Mechanical Ventilator 25.00 


 


2/7/23 02:30  92 17  93   25


 


2/7/23 02:00  90 18 169/77 (118) 92 Mechanical Ventilator 25.00 


 


2/7/23 01:37        25


 


2/7/23 01:00  90      


 


2/7/23 01:00  90 18 167/81 (128) 93 Mechanical Ventilator 25.00 


 


2/7/23 00:00  92 18 167/83 (111) 93 Mechanical Ventilator 25.00 


 


2/6/23 23:59     95   25


 


2/6/23 23:12 36.9       


 


2/6/23 23:00  90 18 167/84 (126) 94 Mechanical Ventilator 25.00 


 


2/6/23 22:54  90 16  93   25


 


2/6/23 22:36 36.8       


 


2/6/23 22:30  92 18 168/83 (126) 94 Mechanical Ventilator 25.00 


 


2/6/23 22:00  96 18 170/84 (120) 95 Mechanical Ventilator 25.00 


 


2/6/23 21:37        25


 


2/6/23 21:30  105 18 177/85 (128) 95 Mechanical Ventilator 25.00 


 


2/6/23 21:04  105 18 175/84 (128) 94 Mechanical Ventilator 25.00 


 


2/6/23 21:00  105 18 181/86 (126) 94 Mechanical Ventilator 25.00 


 


2/6/23 20:30  106 18 165/98 (131) 93 Mechanical Ventilator 25.00 


 


2/6/23 20:21 37.0       


 


2/6/23 20:00  116 18 159/83 (118) 91   


 


2/6/23 19:40     96   25


 


2/6/23 19:30  110 17 158/86 (114) 90 Mechanical Ventilator 25.00 


 


2/6/23 19:00  112      


 


2/6/23 19:00  112 21 182/90 (124) 91   


 


2/6/23 18:44  93 16  94   25


 


2/6/23 18:00  94 17 163/81 (108) 93 Mechanical Ventilator 25.00 


 


2/6/23 17:37        25


 


2/6/23 17:00  91 16 168/80 (109) 95 Mechanical Ventilator 25.00 


 


2/6/23 16:57 37.5       


 


2/6/23 16:23     96 Mechanical Ventilator  25


 


2/6/23 16:00  89 16 160/80 (106) 94 Mechanical Ventilator 25.00 


 


2/6/23 15:00  90 16 158/81 (106) 94 Mechanical Ventilator 25.00 


 


2/6/23 14:22  89 16  93   25


 


2/6/23 14:00  92 27 154/76 (102) 94 Mechanical Ventilator 25.00 


 


2/6/23 13:37        25


 


2/6/23 13:00  98 16 157/87 (110) 94 Mechanical Ventilator 25.00 


 


2/6/23 12:23  98      


 


2/6/23 12:15     96 Mechanical Ventilator  25


 


2/6/23 12:00  100 34 151/84 (106) 94 Mechanical Ventilator 25.00 


 


2/6/23 12:00 36.8       


 


2/6/23 11:00  93 16 164/82 (109) 95 Mechanical Ventilator 25.00 


 


2/6/23 10:48  93 18  94   25














I & O 


 


 2/7/23





 07:00


 


Intake Total 1560 ml


 


Output Total 1800 ml


 


Balance -240 ml








Height & Weight


Height: '"


Weight: lbs. oz. kg; 24.73 BMI


Method:


General Appearance:  No Apparent Distress, WD/WN, Chronically ill, Other 

(Sedated and Intubated)


HEENT:  Other (Pupils sluggishly reactive, endotracheal tube in place 21 at the 

lip.)


Neck:  Non Tender, Supple


Respiratory:  Lungs Clear, Normal Breath Sounds


Cardiovascular:  Extra Beats, Tachycardia


Capillary Refill:  Less Than 3 Seconds


Peripheral Pulses:  2+ Radial Pulses (R), 2+ Radial Pulses (L)


Gastrointestinal:  soft, no organomegaly


Neurologic/Psychiatric:  Other (Unresponsive)





Results


Lab


Laboratory Tests


2/6/23 05:00








2/7/23 03:25











Assessment/Plan


Assessment/Plan


1











TON MOHAMUD MD          Feb 7, 2023 10:42

## 2023-02-07 NOTE — CARDIOLOGY PROGRESS NOTE
Subjective


Date Seen by Provider:  Feb 7, 2023


Time Seen by Provider:  07:16


Subjective/Events-last exam


No acute issue sovernight. BPs increased to 160s/70s. Still no real purposeful 

movements off sedation. Continues to be DNR-DNI.





Objective-Cardiology


Exam


Last Set of Vital Signs





Vital Signs








 2/7/23 2/7/23 2/7/23 2/7/23





 03:53 06:40 07:00 07:30


 


Temp 37.3   


 


Pulse    99


 


Resp   15 


 


B/P (MAP)   167/81 (109) 


 


Pulse Ox   93 


 


O2 Delivery   Mechanical Ventilator 


 


O2 Flow Rate   25.00 


 


FiO2  25  








I&O











Intake and Output 


 


 2/7/23





 00:00


 


Intake Total 1560 ml


 


Output Total 1780 ml


 


Balance -220 ml


 


 


 


Intake Oral 0 ml


 


IV Total 1460 ml


 


Other 100 ml


 


Output Urine Total 1730 ml


 


Gastric Drainage Total 50 ml








General:  Other (intubated sedated)


Lungs:  Clear to Auscultation, Normal Air Movement


Heart:  Regular Rate, Normal S1, Normal S2, No Murmurs


Abdomen:  Other (hypoactive Bowel sounds)


Extremities:  No Clubbing, No Cyanosis


Skin:  No Rashes, No Significant Lesion





Results


Lab


Laboratory Tests


2/7/23 03:25














A/P-Cardiology


Assessment/Plan


Assessment and Plan: 





## Cardioresp arrest on 2-3-22 s/p DCCVx 1 by EMS; unknown time down- TTE with 

EF 55-60% and no Regional WMA. 


- pt now without purposeful movement; concern for myoclonic jerking vs seizures


- we will cont to follow along but no indication for cardiac catheterization 

particularly in light of poor neurological recovery


-  cont asa, plavix and metop





## Post arrest ECG without any ST elevation


- elevated troponin: NSTEMI vs type 2 MI likely due to transient anoxia





## Aute respiratory failure requiring ventilation - likely multi-factorial


- no vent over breathing with sedation holiday; no purposeful movements; concern

for poor neurologic status


- cont to monitor





## Coronary artery disease s/p PCI to mLAD in June 6, 2022 by Dr. Posey, 

calcified coronary system with significant stenosis in the mid LAD, status post 

stenting using alma point stent 3 x 23 mm expanded to 3.1 mm


- Continue on dual antiplatelet therapy





## HTN: BPs ranging up to 160s/70s from 110-150s systolic; HR in the 90s down 

from 100s


- increase metop to 50 bid





## Anemia: Hgb high of 13.6 on admission; 


- stable in the 10s. 


- cont to monitor





## Aute on Chronic diastolic CHF


- Echocardigoram of 1-20-23 showed LVEF 60-65%.  Grade 2 diastolic dysfunction. 

Mod MR.  Mild to mod aortic stenosis with mod AoR.  PASP 45-50 mmHg


- Echo on 2-3-23 (post-code): LVEF 55-60%, mild to mod AS, mild AI; no regional 

wall motion abnormalities were noted


- hold lasix for now.





## Dispo: 


- family discussion necessary to define goals of care (GOC) at this point- poor 

neurologic recovery given no signs of neurologic activity despite. Pt has been 

off of sedation for > 24 hours without any positive signs of neurologic deepak

very. 


- hold on any cardiac intervention at this time.


- will consider signing off once GOC defined











KLAUDIA MODI MD           Feb 7, 2023 08:26

## 2023-02-08 VITALS — SYSTOLIC BLOOD PRESSURE: 143 MMHG | DIASTOLIC BLOOD PRESSURE: 76 MMHG

## 2023-02-08 VITALS — DIASTOLIC BLOOD PRESSURE: 87 MMHG | SYSTOLIC BLOOD PRESSURE: 172 MMHG

## 2023-02-08 LAB
ALBUMIN SERPL-MCNC: 3.1 GM/DL (ref 3.2–4.5)
ALP SERPL-CCNC: 59 U/L (ref 40–136)
ALT SERPL-CCNC: 28 U/L (ref 0–55)
BASOPHILS # BLD AUTO: 0 10^3/UL (ref 0–0.1)
BASOPHILS NFR BLD AUTO: 0 % (ref 0–10)
BILIRUB SERPL-MCNC: 0.6 MG/DL (ref 0.1–1)
BUN/CREAT SERPL: 39
CALCIUM SERPL-MCNC: 8.1 MG/DL (ref 8.5–10.1)
CHLORIDE SERPL-SCNC: 110 MMOL/L (ref 98–107)
CO2 SERPL-SCNC: 21 MMOL/L (ref 21–32)
CREAT SERPL-MCNC: 0.57 MG/DL (ref 0.6–1.3)
EOSINOPHIL # BLD AUTO: 0 10^3/UL (ref 0–0.3)
EOSINOPHIL NFR BLD AUTO: 0 % (ref 0–10)
GFR SERPLBLD BASED ON 1.73 SQ M-ARVRAT: 112 ML/MIN
GLUCOSE SERPL-MCNC: 119 MG/DL (ref 70–105)
HCT VFR BLD CALC: 36 % (ref 40–54)
HGB BLD-MCNC: 11.7 G/DL (ref 13.3–17.7)
LYMPHOCYTES # BLD AUTO: 0.5 10^3/UL (ref 1–4)
LYMPHOCYTES NFR BLD AUTO: 6 % (ref 12–44)
MAGNESIUM SERPL-MCNC: 2.1 MG/DL (ref 1.6–2.4)
MANUAL DIFFERENTIAL PERFORMED BLD QL: NO
MCH RBC QN AUTO: 32 PG (ref 25–34)
MCHC RBC AUTO-ENTMCNC: 33 G/DL (ref 32–36)
MCV RBC AUTO: 96 FL (ref 80–99)
MONOCYTES # BLD AUTO: 0.6 10^3/UL (ref 0–1)
MONOCYTES NFR BLD AUTO: 7 % (ref 0–12)
NEUTROPHILS # BLD AUTO: 8.5 10^3/UL (ref 1.8–7.8)
NEUTROPHILS NFR BLD AUTO: 87 % (ref 42–75)
PHOSPHATE SERPL-MCNC: 2.4 MG/DL (ref 2.3–4.7)
PLATELET # BLD: 142 10^3/UL (ref 130–400)
PMV BLD AUTO: 10.6 FL (ref 9–12.2)
POTASSIUM SERPL-SCNC: 3.3 MMOL/L (ref 3.6–5)
PROT SERPL-MCNC: 5.6 GM/DL (ref 6.4–8.2)
SODIUM SERPL-SCNC: 143 MMOL/L (ref 135–145)
WBC # BLD AUTO: 9.7 10^3/UL (ref 4.3–11)

## 2023-02-08 RX ADMIN — MORPHINE SULFATE PRN MG: 10 INJECTION, SOLUTION INTRAMUSCULAR; INTRAVENOUS at 22:54

## 2023-02-08 RX ADMIN — POTASSIUM CHLORIDE SCH MLS/HR: 200 INJECTION, SOLUTION INTRAVENOUS at 06:16

## 2023-02-08 RX ADMIN — METHYLPREDNISOLONE SODIUM SUCCINATE SCH MG: 125 INJECTION, POWDER, FOR SOLUTION INTRAMUSCULAR; INTRAVENOUS at 05:24

## 2023-02-08 RX ADMIN — MORPHINE SULFATE PRN MG: 10 INJECTION, SOLUTION INTRAMUSCULAR; INTRAVENOUS at 21:43

## 2023-02-08 RX ADMIN — POTASSIUM CHLORIDE SCH MEQ: 1500 TABLET, EXTENDED RELEASE ORAL at 06:07

## 2023-02-08 RX ADMIN — SODIUM CHLORIDE SCH MLS/HR: 900 INJECTION INTRAVENOUS at 05:24

## 2023-02-08 RX ADMIN — ASPIRIN 81 MG CHEWABLE TABLET SCH MG: 81 TABLET CHEWABLE at 09:43

## 2023-02-08 RX ADMIN — INSULIN ASPART SCH UNIT: 100 INJECTION, SOLUTION INTRAVENOUS; SUBCUTANEOUS at 06:03

## 2023-02-08 RX ADMIN — MORPHINE SULFATE PRN MG: 10 INJECTION, SOLUTION INTRAMUSCULAR; INTRAVENOUS at 18:14

## 2023-02-08 RX ADMIN — ALBUTEROL SULFATE SCH MG: 2.5 SOLUTION RESPIRATORY (INHALATION) at 11:40

## 2023-02-08 RX ADMIN — ENOXAPARIN SODIUM SCH MG: 100 INJECTION SUBCUTANEOUS at 11:40

## 2023-02-08 RX ADMIN — MORPHINE SULFATE PRN MG: 10 INJECTION, SOLUTION INTRAMUSCULAR; INTRAVENOUS at 14:19

## 2023-02-08 RX ADMIN — LORAZEPAM PRN MG: 2 INJECTION INTRAMUSCULAR; INTRAVENOUS at 19:22

## 2023-02-08 RX ADMIN — ALBUTEROL SULFATE SCH MG: 2.5 SOLUTION RESPIRATORY (INHALATION) at 07:03

## 2023-02-08 RX ADMIN — METOPROLOL TARTRATE SCH MG: 50 TABLET, FILM COATED ORAL at 09:43

## 2023-02-08 RX ADMIN — ALBUTEROL SULFATE SCH MG: 2.5 SOLUTION RESPIRATORY (INHALATION) at 01:59

## 2023-02-08 RX ADMIN — MORPHINE SULFATE PRN MG: 10 INJECTION, SOLUTION INTRAMUSCULAR; INTRAVENOUS at 10:57

## 2023-02-08 RX ADMIN — POTASSIUM CHLORIDE SCH MLS/HR: 200 INJECTION, SOLUTION INTRAVENOUS at 07:23

## 2023-02-08 RX ADMIN — MORPHINE SULFATE PRN MG: 10 INJECTION, SOLUTION INTRAMUSCULAR; INTRAVENOUS at 11:23

## 2023-02-08 RX ADMIN — MORPHINE SULFATE PRN MG: 10 INJECTION, SOLUTION INTRAMUSCULAR; INTRAVENOUS at 19:22

## 2023-02-08 RX ADMIN — MAGNESIUM SULFATE IN DEXTROSE SCH MLS/HR: 10 INJECTION, SOLUTION INTRAVENOUS at 06:07

## 2023-02-08 RX ADMIN — PANTOPRAZOLE SODIUM SCH MG: 40 INJECTION, POWDER, FOR SOLUTION INTRAVENOUS at 09:43

## 2023-02-08 RX ADMIN — Medication SCH MLS/HR: at 11:58

## 2023-02-08 RX ADMIN — LORAZEPAM PRN MG: 2 INJECTION INTRAMUSCULAR; INTRAVENOUS at 11:05

## 2023-02-08 RX ADMIN — INSULIN ASPART SCH UNIT: 100 INJECTION, SOLUTION INTRAVENOUS; SUBCUTANEOUS at 12:00

## 2023-02-08 RX ADMIN — CLOPIDOGREL BISULFATE SCH MG: 75 TABLET, FILM COATED ORAL at 09:43

## 2023-02-08 RX ADMIN — LORAZEPAM PRN MG: 2 INJECTION INTRAMUSCULAR; INTRAVENOUS at 20:38

## 2023-02-08 RX ADMIN — HYDRALAZINE HYDROCHLORIDE PRN MG: 20 INJECTION INTRAMUSCULAR; INTRAVENOUS at 04:03

## 2023-02-08 RX ADMIN — METHYLPREDNISOLONE SODIUM SUCCINATE SCH MG: 125 INJECTION, POWDER, FOR SOLUTION INTRAMUSCULAR; INTRAVENOUS at 12:00

## 2023-02-08 RX ADMIN — PROPOFOL SCH MLS/HR: 10 INJECTION, EMULSION INTRAVENOUS at 13:34

## 2023-02-08 RX ADMIN — LORAZEPAM SCH MLS/HR: 2 INJECTION INTRAMUSCULAR; INTRAVENOUS at 07:23

## 2023-02-08 RX ADMIN — MORPHINE SULFATE PRN MG: 10 INJECTION, SOLUTION INTRAMUSCULAR; INTRAVENOUS at 20:38

## 2023-02-08 NOTE — PROGRESS NOTE - CARDIOLOGY
Cardiology SOAP Progress Note


Subjective:


Unresponsive





Objective:


I&O/Vital Signs











 2/7/23 2/7/23 2/7/23 2/7/23





 22:00 22:20 23:00 23:54


 


Pulse 89 99 92 


 


Resp 19 18 16 


 


B/P (MAP) 124/69 (90)  140/74 (107) 


 


Pulse Ox 95 94 95 94


 


O2 Delivery   Mechanical Ventilator 


 


O2 Flow Rate   25.00 


 


FiO2  25  25





 2/8/23 2/8/23 2/8/23 2/8/23





 00:00 00:00 01:00 01:00


 


Temp 36.3   


 


Pulse 86  90 90


 


Resp 17   17


 


B/P (MAP) 142/79 (109)   172/87 (129)


 


Pulse Ox 95   95


 


O2 Delivery Mechanical Ventilator   


 


FiO2  25  


 


    





 2/8/23 2/8/23 2/8/23 2/8/23





 01:59 02:00 03:00 03:57


 


Pulse 85 85 89 


 


Resp 18 16 16 


 


B/P (MAP)  133/70 (99) 134/74 (102) 


 


Pulse Ox 92 94 93 


 


FiO2 25   25





 2/8/23 2/8/23 2/8/23 2/8/23





 03:58 04:00 05:00 06:00


 


Temp  37.7  


 


Pulse  92 95 98


 


Resp  17 20 19


 


B/P (MAP)  159/83 (108) 122/71 (93) 141/75 (109)


 


Pulse Ox 94 95 94 95


 


O2 Delivery   Mechanical Ventilator 


 


O2 Flow Rate   25.00 


 


FiO2 25   


 


    





 2/8/23 2/8/23 2/8/23 2/8/23





 07:00 07:00 07:00 07:05


 


Pulse 98 97 98 97


 


Resp  19 19 18


 


B/P (MAP)  143/76 (105) 143/76 (98) 


 


Pulse Ox  94 94 93


 


O2 Delivery   Mechanical Ventilator 


 


O2 Flow Rate   25.00 


 


FiO2    25





 2/8/23 2/8/23 2/8/23 





 08:00 08:00 09:00 


 


Temp  37.5  


 


Pulse 98  98 


 


Resp 35  17 


 


B/P (MAP) 136/81 (99)  143/71 (95) 


 


Pulse Ox 93  96 


 


O2 Delivery Mechanical Ventilator  Mechanical Ventilator 


 


O2 Flow Rate 25.00  25.00 














 2/8/23





 00:00


 


Intake Total 1355 ml


 


Output Total 875 ml


 


Balance 480 ml








Constitutional:  other (Unresponsive, on mech vent)


Respiratory:  No accessory muscle use, No respiratory distress; other (coarse 

breath sounds throughout; currently on ventilator)


Cardiovascular:  regular rate-rhythm; No JVD; S1 and S2, systolic murmur


Gastrointestional:  audible bowel sounds (NG tube in place)


Extremities:  no lower extremity edema bilateral


Neurologic/Psychiatric:  other (unresponsivie)


Skin:  No rash on exposed areas, No ulcerations on exposed areas





Results/Procedures:


Labs


Laboratory Tests


2/7/23 11:51: Glucometer 128H


2/7/23 18:09: Glucometer 120H


2/7/23 23:52: Glucometer 109


2/8/23 04:55: 


White Blood Count 9.7, Red Blood Count 3.69L, Hemoglobin 11.7L, Hematocrit 36L, 

Mean Corpuscular Volume 96, Mean Corpuscular Hemoglobin 32, Mean Corpuscular 

Hemoglobin Concent 33, Red Cell Distribution Width 12.8, Platelet Count 142, 

Mean Platelet Volume 10.6, Immature Granulocyte % (Auto) 1, Neutrophils (%) 

(Auto) 87H, Lymphocytes (%) (Auto) 6L, Monocytes (%) (Auto) 7, Eosinophils (%) 

(Auto) 0, Basophils (%) (Auto) 0, Neutrophils # (Auto) 8.5H, Lymphocytes # 

(Auto) 0.5L, Monocytes # (Auto) 0.6, Eosinophils # (Auto) 0.0, Basophils # 

(Auto) 0.0, Immature Granulocyte # (Auto) 0.1, Sodium Level 143, Potassium Level

3.3L, Chloride Level 110H, Carbon Dioxide Level 21, Anion Gap 12, Blood Urea 

Nitrogen 22H, Creatinine 0.57L, Estimat Glomerular Filtration Rate 112, 

BUN/Creatinine Ratio 39, Glucose Level 119H, Calcium Level 8.1L, Corrected 

Calcium 8.8, Phosphorus Level 2.4, Magnesium Level 2.1, Total Bilirubin 0.6, 

Aspartate Amino Transf (AST/SGOT) 48H, Alanine Aminotransferase (ALT/SGPT) 28, 

Alkaline Phosphatase 59, Total Protein 5.6L, Albumin 3.1L


2/8/23 05:20: Glucometer 119H





Microbiology


2/3/23 MRSA Screen - Final, Complete


         MRSA not isolated


2/3/23 Urine Culture - Final, Complete


         NO GROWTH


2/3/23 Blood Culture - Preliminary, Resulted


         No growth





Laboratory Tests


2/7/23 03:25








2/8/23 04:55











A/P:


Assessment:





No signs of neurologic recovery after cardioresp arres





Cardioresp arrest on 2-3-22


- witnessed collapse per family - CPR started - EMS reports presenting rhythm as

v-fib (no strips available) - defibrillated x1 per EMS restoring SR (no 

documentation available)





Post arrest ECG without any ST elevation


- elevated troponin: NSTEMI vs type 2 MI due to transient anoxia





Aute respiratory failure requiring ventilation - likely multi-factorial


- Acute on chronic exacerbation of COPD; acute on chronic diastolic CHF





Sepsis


- UTI - management per medical services





Coronary artery disease


- Cardiac catheterization was done on June 6, 2022 by Dr. Posey, calcified 

coronary system with significant stenosis in the mid LAD, status post stenting 

using alma point stent 3 x 23 mm expanded to 3.1 mm with excellent results,


- Continue on dual antiplatelet therapy





Aute on Chronic diastolic CHF


- Echocardigoram of 1-20-23 showed LVEF 60-65%.  Grade 2 diastolic dysfunction. 

Mod MR.  Mild to mod aortic stenosis with mod AoR.  PASP 45-50 mmHg


- Echo on 2-3-23 (post-code): LVEF 55-60%, mild to mod AS, mild AI





H/O Left thigh pain, has been complaining of left leg pain


- BLE arterial duplex done 6/7/22 by Dr. Posey showing  bilateral hemodynamic 

significant stenoses on the left at the level of the common femoral and on the 

right at the level of the popliteal artery. 





Hypertension





Hyperlipidemia





History of CVA with right hemiparesis





Tobaccoism


- still an active smoker despite multiple advice to quit


Plan:





* Given no signs of neurologic recovery, prognosis is poor


* Hosp service is consulting with family regarding disposition


* Meanwhile, continue current cardiac regimen


* Replenish electrolytes and monitor labs


Post code - witnessed collapse at home - EMS reports v-fib at time of 

presentation which he was defibrillated x 1


- NSTEMI vs type 2 MI. No evidence of STEMI


- start DAPT (recent coronary intervention in June 2022 by Dr. Posey)


- Add BB as BP will allow


Sepsis - likely secondary to UTI - management per medical services


Acute resp failure


- likely multi-factorial d/t acute on chronic exacerbation of COPD, acute on 

chronic diastolic CHF


Acute on chronic diastolic CHF


- treat with diuretics as tolerated/indicated


Monitor lab closely


Replace electrolytes as indicated


Further recs will be based on his hospital course


We would like to thank Medical services for this consult





Clinical Quality Measures


Type of Care:


Type of Care:  Comfort Measures











PEACE ADAN MD FACP FAC CCDS    Feb 8, 2023 09:28

## 2023-02-08 NOTE — TELE-ICU PROGRESS NOTE
Subjective


Date Seen by a Provider:  Feb 8, 2023


Time Seen by a Provider:  08:01


Subjective/Events-last exam


(Tele-ICU Physician , Progress Note )


Service provided via interactive audio and video telecommunications  E-CARE 

system to a patient admitted to ICU bed in [facility].


Patient is seen today due to persistent need of  ICU care





Available chart/ vitals / labs / Images reviewed


Video assessment done using   teleICU camera, rest of exam as per RN


Discussed with RN


Events overnight : 





63 yo M on vent since 2/3. on vent now DNR, AC 18, Vt 550, FiO2 25%, Has AECOPD,

had witnessed CP arrest at home.took 10 min until ROSC, on ASA Plavix beta 

blocker


not following commands post arrest, Daughter is leaning towards comfort care.


On IV Cefepime, Medrol, Kepra for Sz-not having Sz now, off IV Ativan, no 

sedation for 48 hours


CXR 2/3 ET ok, opacities LLL





Sepsis Event


Evaluation


Height, Weight, BMI


Height: '"


Weight: lbs. oz. kg; 24.73 BMI


Method:





Exam


Exam


Patient acknowledged, consented, and participated in this virtual visit which 

was conducted using real time audio/video


Vital Signs








  Date Time  Temp Pulse Resp B/P (MAP) Pulse Ox O2 Delivery O2 Flow Rate FiO2


 


2/8/23 07:05  97 18  93   25


 


2/8/23 07:00  97 19 143/76 (105) 94   


 


2/8/23 06:00  98 19 141/75 (109) 95   


 


2/8/23 05:00  95 20 122/71 (93) 94 Mechanical Ventilator 25.00 


 


2/8/23 04:00 37.7 92 17 159/83 (108) 95   


 


2/8/23 03:58     94   25


 


2/8/23 03:57        25


 


2/8/23 03:00  89 16 134/74 (102) 93   


 


2/8/23 02:00  85 16 133/70 (99) 94   


 


2/8/23 01:59  85 18  92   25


 


2/8/23 01:00  90 17 172/87 (129) 95   


 


2/8/23 01:00  90      


 


2/8/23 00:00        25


 


2/8/23 00:00 36.3 86 17 142/79 (109) 95 Mechanical Ventilator  


 


2/7/23 23:54     94   25


 


2/7/23 23:00  92 16 140/74 (107) 95 Mechanical Ventilator 25.00 


 


2/7/23 22:20  99 18  94   25


 


2/7/23 22:00  89 19 124/69 (90) 95   


 


2/7/23 21:00  95 19 141/72 (100) 95   


 


2/7/23 20:00 36.9       


 


2/7/23 20:00        25


 


2/7/23 20:00  112 21 155/82 (106) 94   


 


2/7/23 19:30     94   25


 


2/7/23 19:30  115  178/93 (131) 91   


 


2/7/23 19:00  103      


 


2/7/23 19:00  105 22 168/87 (125) 93 Mechanical Ventilator 25.00 


 


2/7/23 18:59  104 20  94   25


 


2/7/23 18:00  101 23 193/89 (123) 96 Mechanical Ventilator 25.00 


 


2/7/23 17:37        25


 


2/7/23 17:00  95 18 183/80 (114) 96 Mechanical Ventilator 25.00 


 


2/7/23 16:30     94   25


 


2/7/23 16:00  90 26 179/81 (113) 96 Mechanical Ventilator 25.00 


 


2/7/23 15:49 37.4       


 


2/7/23 15:00  86 23 168/73 (104) 94 Mechanical Ventilator 25.00 


 


2/7/23 14:17  87 14  95   25


 


2/7/23 14:00  85 24 172/86 (114) 95 Mechanical Ventilator 25.00 


 


2/7/23 13:37        25


 


2/7/23 13:01  85      


 


2/7/23 13:00  84 21 166/83 (110) 94 Mechanical Ventilator 25.00 


 


2/7/23 12:15     94   25


 


2/7/23 12:00  86 17 171/89 (116) 97 Mechanical Ventilator 25.00 


 


2/7/23 12:00 36.6       


 


2/7/23 11:00  87 22 164/83 (110) 94 Mechanical Ventilator 25.00 


 


2/7/23 10:18  89 18  93   25


 


2/7/23 10:00  97 12 180/83 (115) 95 Mechanical Ventilator 25.00 


 


2/7/23 09:37        25


 


2/7/23 09:00  90 14 184/85 (118) 96 Mechanical Ventilator 25.00 














I & O 


 


 2/8/23





 07:00


 


Intake Total 2009 ml


 


Output Total 1505 ml


 


Balance 504 ml








Height & Weight


Height: '"


Weight: lbs. oz. kg; 24.73 BMI


Method:


General Appearance:  Other (On ventilator support)


HEENT:  No PERRL/EOMI


Neck:  Non Tender, Supple


Respiratory:  Decreased Breath Sounds, Other (bilateral and equal chest rise and

fall)


Cardiovascular:  Regular Rate, Rhythm


Capillary Refill:  Less Than 3 Seconds


Peripheral Pulses:  2+ Radial Pulses (R), 2+ Radial Pulses (L)


Gastrointestinal:  normal bowel sounds, non tender, soft, no organomegaly


Extremity:  No Pedal Edema


Neurologic/Psychiatric:  Other (Unresponsive)


Skin:  Normal Color, Warm/Dry





Results


Lab


Laboratory Tests


2/7/23 03:25








2/8/23 04:55











Assessment/Plan


Assessment/Plan


s/p CP arrest with cerebral anoxia, on vent, family to decide on GOC-daughter 

leaning towards comfort care


continue on vent


off pressors, Off IV propofol, last TG 97 on 2/5


Critical Care:  Ventilator Management


Time spent with patient (mins):  25











HUSEYIN ROWE MD              Feb 8, 2023 08:06

## 2023-02-08 NOTE — PROGRESS NOTE - HOSPITALIST
TRISHKayleyFAVIAN ROSENTHAL 2/8/23 1406:


Subjective


HPI/CC On Admission


Date Seen by Provider:  Feb 8, 2023


Time Seen by Provider:  08:20


Cardiac Arrest / Respiratory Arrest


Subjective/Events-last exam


Upon follow-up for cardiac arrest, respiratory arrest, and presumed anoxic brain

injury, patient is laying supine with ventilator support. 





Hospital course:





Patient is a 62-year-old male with history of COPD with recent 8-day 

hospitalization at [facility] for respiratory failure requiring 

ventilation who presents status post witnessed cardiac arrest.





02/03/23





Per ED: Patient is a 62-year-old male with history of COPD with recent 8-day 

hospitalization at [facility] for respiratory failure requiring 

ventilation who presents status post witnessed cardiac arrest.  Patient was 

downstairs when he yelled for breath stating he was short of breath.  The 

patient does not require oxygen at home and does not wear CPAP at night.  The 

patient then attempted to stand and collapsed.  He was unresponsive and upon 

family members arrival was apneic and pulseless.  Chest compressions were 

started and on EMS arrival, the patient was apneic, pulseless and found to be in

V. fib and was defibrillated once.  The patient had approximately 10 minutes of 

chest compressions, received multiple doses of bicarb and epinephrine prior to 

return of his spontaneous circulation.  An IO was placed and the patient was 

intubated without sedation.  History is limited due to the patient's clinical 

condition.





   -Chest x-ray: Endotracheal tube in proper place.,  Pulmonary vascular 

congestion.


   -EKG 1: A. fib


   -EKG 2: Normal sinus rhythm, no acute ST segment elevation.





Patient with witnessed cardiac/respiratory arrest, with spontaneous return of 

circulation after inf[place] CPR.  The Apparent cause of patient's respiratory 

arrest is unknown as this is the second episode within 1 month.  Patient is 

susceptible to underlying arrhythmia with pulmonary edema secondary to his COPD,

and has history of known stroke.  Lungs are compliant with an easily ventilated 

on mechanical ventilator. Patient is requiring minimal sedation to prevent 

overbreathing the vent.  Pupils are sluggishly reactive reactive.  Empiric 

antibiotics given due to elevation of white blood cell count which is thought to

be likely stress response.  Troponin is elevated thought likely to be related to

cardiac arrest.  Initial EKG shows A. fib.  Repeat EKG shows normal sinus rhythm

with no evidence of ST segment elevation lactic acid is likely due to prolonged 

hypoxic state.  Sepsis considered but felt unlikely due to clinical presentation

presentation.  Blood cultures, COVID and flu swabs were obtained.  Empiric 

antibiotics will be given.  The patient remains in critical condition with 

stable vital signs on ventilator.  Dr. Harris accepts to [facility] 

ICU.





Per Dr. Harris, hospitals (02/03/23): His family reports he has a history of COPD, 

coronary artery disease (had stenting done in June 2022), peripheral artery 

disease (stenting done in January 2023 in [place]), hypertension and 

hyperlipidemia. He had a stroke about 7-8 years ago and has residual right sided

weakness. He quit smoking since his last admission a couple of weeks ago and is 

using nicotine patches, last one placed last night. He does have a history of 

heavy alcohol use up to 15 beers per day, until after his last hospitalization 

and since then he has had only a couple of beers per day. He does not use 

supplemental oxygen at home, his daughter notes they tested him at clinic and he

did not qualify. He does not use CPAP. He was recently admitted with respiratory

failure requiring intubation a couple of weeks ago, but had recovered very well.





Consults:





E-ICU 


   -Head CT: No acute intra-cranial process is detected.


   -Began sedation holiday that evening, no purposeful movement and myoclonic 

jerks


Cardiology 


   -Echo (post-code): LVEF 55-60%, mild to mod AS, mild AI; no regional wall 

motion abnormalities were noted


PT/OT :


   -No intervention, patient on ventilator support


General Surgery 


   -Central line placement for pressor support





02/04/23: Uneventful, on ventilator support





02/05/23: Family decided on DNR status





02/06/23: PT/OT dismissed from case due to likely unrecoverable neurologic 

function





EKG: AFib with aberrant conduction


EKG: Sinus Rhythm


EKG: Sinus rhythm





02/07/23: Family briefly considered and discussed the possibility of a 

neurological consult at [facility] as opposed to comfort care measures.





EKG: Afib with aberrant conduction








02/08/23: Family agreed on comfort care measures. Family is at bedside. Patient 

was extubated per family request at 11:05








Review of Systems


ROS unable to obtain. Patient is on ventilator support.





Objective


Exam


Vital Signs





Vital Signs








  Date Time  Temp Pulse Resp B/P (MAP) Pulse Ox O2 Delivery O2 Flow Rate FiO2


 


2/8/23 11:05      Room Air  


 


2/8/23 11:00  111 16 172/80 (110) 95  25.00 


 


2/8/23 08:00 37.5       


 


2/8/23 08:00        25





Capillary Refill : Less Than 3 Seconds


General Appearance:  No Apparent Distress, Chronically ill


HEENT:  No PERRL/EOMI


Neck:  Normal Inspection


Respiratory:  Other (bilateral chest rise and fall)


Cardiovascular:  Regular Rate, Rhythm


Gastrointestinal:  Soft


Neurologic/Psychiatric:  Other (On mechanical ventilator)


Skin:  Normal Color, Warm/Dry





Results/Procedures


Lab


Laboratory Tests


2/8/23 04:55








Patient resulted labs reviewed.





Assessment/Plan


Assessment and Plan


Assess & Plan/Chief Complaint


Presumed Anoxic Brain Injury, likely unrecoverable


Severe decline in status


Cardiac arrest


Respiratory arrest


   Confer with EICU physician for two physician statement of futility


   Has been off sedation for > 48 hours, no purposeful movements or signs of 

neurological recovery


   Family has decided on comfort care measures


      Family at bedside this AM @ 0930


   Family agrees with DNR


   Terminal extubation at 11:05 per family request


Fluid/nutritional needs


   EICU requested to start enteral feeds





Severe COPD


Smoker


Alcohol use





TRACI WONG DO 2/9/23 0516:


Supervisory-Addendum Brief


Verification & Attestation


Participated in pt care:  history, MDM, physical


Personally performed:  exam, history, MDM, supervision of care


Care discussed with:  Medical Student


Procedures:  n/a


Results interpretation:  Verified all documentation


Verification and Attestation of Medical Student E/M Service





A medical student performed and documented this service in my presence. I 

reviewed and verified all information documented by the medical student and made

modifications to such information, when appropriate. I personally performed the 

physical exam and medical decision making. 





 Traci Wong, Feb 9, 2023,05:16











FAVIAN JACOBS              Feb 8, 2023 14:06


TRACI WONG DO                 Feb 9, 2023 05:16

## 2023-02-09 RX ADMIN — MORPHINE SULFATE PRN MG: 4 INJECTION, SOLUTION INTRAMUSCULAR; INTRAVENOUS at 07:53

## 2023-02-09 RX ADMIN — MORPHINE SULFATE PRN MG: 4 INJECTION, SOLUTION INTRAMUSCULAR; INTRAVENOUS at 09:21

## 2023-02-09 RX ADMIN — LORAZEPAM PRN MG: 2 INJECTION INTRAMUSCULAR; INTRAVENOUS at 09:21

## 2023-02-09 RX ADMIN — LORAZEPAM PRN MG: 2 INJECTION INTRAMUSCULAR; INTRAVENOUS at 07:53

## 2023-02-09 NOTE — DISCHARGE SUMMARY
Discharge Summary


Hospital Course


Was the Problem List Reviewed?:  Yes


Problems/Dx:  


(1) Cardiac arrest


Status:  Acute


(2) Respiratory arrest


Status:  Acute


Hospital Course


Date of Admission: Feb 3, 2023 at 10:02 


Admission Diagnosis :  





[facility]  





Date of Discharge: 23 


Discharge Diagnosis: [ ]








Hospital Course:


Lengthy course after he was admitted following cardiac and respiratory arrest. 

ROSC was obtained. Patient remained on ventilator and due to severe COPD at 

baseline his prognosis remained grim. Family was updated on the presumed anoxic 

brain injury and they requested neuro consult with transfer but due to poor 

prognosis assessed by  and other facilities the transfer was denied. Family 

agreed to DNR and then terminal extubation and then he passed away the next day.














Labs and Pending Lab Test:





Microbiology


2/3/23 MRSA Screen - Final, Complete


         MRSA not isolated


2/3/23 Urine Culture - Final, Complete


         NO GROWTH


2/3/23 Blood Culture - Final, Complete


         No growth





Home Meds


Active


Vitamin B-1 (Thiamine HCl) 100 Mg Tablet 100 Mg PO DAILY@0700


Folic Acid 1 Mg Tablet 1 Mg PO DAILY


Oxyir Tablet (Oxycodone HCl) 5 Mg Tab 5 Mg PO Q4HR PRN


Amlodipine Besylate 5 Mg Tablet 5 Mg PO DAILY


Reported


Iprat-Albut 0.5-3(2.5) mg/3 ml (Ipratropium/Albuterol Sulfate) 0.5 Mg-3 Mg (2.5 

Mg Base)/3 Ml Ampul.neb 3 Ml IH Q6H PRN


Atorvastatin Calcium 20 Mg Tablet 20 Mg PO DAILY


     LAST FILLED 10- #30/30 DAY SUPPLY


Pantoprazole Sodium 40 Mg Tablet. 40 Mg PO DAILY


Sertraline HCl 50 Mg Tablet 50 Mg PO DAILY


Aspirin EC (Aspirin) 81 Mg Tablet. 81 Mg PO DAILY


Clopidogrel (Clopidogrel Bisulfate) 75 Mg Tablet 75 Mg PO DAILY


Metoprolol Succinate 50 Mg Tab.er.24h 50 Mg PO DAILY


Assessment/Pt Instructions





Discharge Planning:  <30 minutes discharge planning





Discharge Physical Examination


Vital Signs





Vital Signs








  Date Time  Temp Pulse Resp B/P (MAP) Pulse Ox O2 Delivery O2 Flow Rate FiO2


 


23 19:06      Room Air  


 


23 11:00  111 16 172/80 (110) 95  25.00 


 


23 08:00 37.5       


 


23 08:00        25








Allergies:  


Coded Allergies:  


     No Known Drug Allergies (Unverified , 22)





Discharge Summary


Date of Admission


Feb 3, 2023 at 10:02


Date of Discharge





Comfort Measures/


End of Life Care:  Comfort Measures





Discharge Diagnosis














CANDIS WONG DO                 2023 10:54